# Patient Record
Sex: MALE | Race: WHITE | NOT HISPANIC OR LATINO | URBAN - METROPOLITAN AREA
[De-identification: names, ages, dates, MRNs, and addresses within clinical notes are randomized per-mention and may not be internally consistent; named-entity substitution may affect disease eponyms.]

---

## 2018-06-14 ENCOUNTER — OUTPATIENT (OUTPATIENT)
Dept: OUTPATIENT SERVICES | Facility: HOSPITAL | Age: 83
LOS: 1 days | End: 2018-06-14
Payer: MEDICARE

## 2018-06-14 DIAGNOSIS — R94.31 ABNORMAL ELECTROCARDIOGRAM [ECG] [EKG]: ICD-10-CM

## 2018-06-14 DIAGNOSIS — I49.3 VENTRICULAR PREMATURE DEPOLARIZATION: ICD-10-CM

## 2018-06-14 PROCEDURE — 78452 HT MUSCLE IMAGE SPECT MULT: CPT | Mod: 26

## 2018-06-14 PROCEDURE — 93018 CV STRESS TEST I&R ONLY: CPT

## 2018-06-14 PROCEDURE — A9500: CPT

## 2018-06-14 PROCEDURE — 78452 HT MUSCLE IMAGE SPECT MULT: CPT

## 2018-06-14 PROCEDURE — A9505: CPT

## 2018-06-14 PROCEDURE — 93016 CV STRESS TEST SUPVJ ONLY: CPT

## 2018-06-14 PROCEDURE — 93017 CV STRESS TEST TRACING ONLY: CPT

## 2020-10-16 ENCOUNTER — APPOINTMENT (OUTPATIENT)
Dept: HEART AND VASCULAR | Facility: CLINIC | Age: 85
End: 2020-10-16
Payer: MEDICARE

## 2020-10-16 DIAGNOSIS — M10.9 GOUT, UNSPECIFIED: ICD-10-CM

## 2020-10-16 DIAGNOSIS — E78.5 HYPERLIPIDEMIA, UNSPECIFIED: ICD-10-CM

## 2020-10-16 DIAGNOSIS — E11.9 TYPE 2 DIABETES MELLITUS W/OUT COMPLICATIONS: ICD-10-CM

## 2020-10-16 DIAGNOSIS — Z87.891 PERSONAL HISTORY OF NICOTINE DEPENDENCE: ICD-10-CM

## 2020-10-16 DIAGNOSIS — I48.19 OTHER PERSISTENT ATRIAL FIBRILLATION: ICD-10-CM

## 2020-10-16 DIAGNOSIS — G25.0 ESSENTIAL TREMOR: ICD-10-CM

## 2020-10-16 PROCEDURE — 99204 OFFICE O/P NEW MOD 45 MIN: CPT

## 2020-10-18 PROBLEM — E78.5 HYPERLIPIDEMIA, MILD: Status: ACTIVE | Noted: 2020-10-18

## 2020-10-18 PROBLEM — I48.19 PERSISTENT ATRIAL FIBRILLATION: Status: ACTIVE | Noted: 2020-10-18

## 2020-10-18 PROBLEM — Z87.891 FORMER SMOKER: Status: ACTIVE | Noted: 2020-10-18

## 2020-10-18 PROBLEM — M10.9 GOUT: Status: ACTIVE | Noted: 2020-10-18

## 2020-10-18 PROBLEM — E11.9 TYPE II DIABETES MELLITUS: Status: ACTIVE | Noted: 2020-10-18

## 2020-10-18 PROBLEM — G25.0 TREMOR, ESSENTIAL: Status: ACTIVE | Noted: 2020-10-18

## 2020-10-18 NOTE — PHYSICAL EXAM
[General Appearance - Well Developed] : well developed [Normal Appearance] : normal appearance [Well Groomed] : well groomed [General Appearance - Well Nourished] : well nourished [General Appearance - In No Acute Distress] : no acute distress [Normal Conjunctiva] : the conjunctiva exhibited no abnormalities [No Deformities] : no deformities [Normal Oral Mucosa] : normal oral mucosa [Eyelids - No Xanthelasma] : the eyelids demonstrated no xanthelasmas [No Oral Pallor] : no oral pallor [Normal Jugular Venous A Waves Present] : normal jugular venous A waves present [No Oral Cyanosis] : no oral cyanosis [No Jugular Venous [place] A Waves] : no jugular venous [place] A waves [Normal Jugular Venous V Waves Present] : normal jugular venous V waves present [Irregularly Irregular] : irregularly irregular [Respiration, Rhythm And Depth] : normal respiratory rhythm and effort [Exaggerated Use Of Accessory Muscles For Inspiration] : no accessory muscle use [Auscultation Breath Sounds / Voice Sounds] : lungs were clear to auscultation bilaterally [Abdomen Soft] : soft [Abdomen Tenderness] : non-tender [Abdomen Mass (___ Cm)] : no abdominal mass palpated [Gait - Sufficient For Exercise Testing] : the gait was sufficient for exercise testing [Abnormal Walk] : normal gait [Cyanosis, Localized] : no localized cyanosis [Nail Clubbing] : no clubbing of the fingernails [Petechial Hemorrhages (___cm)] : no petechial hemorrhages [] : no ischemic changes

## 2020-10-18 NOTE — REVIEW OF SYSTEMS
[Fever] : no fever [Feeling Fatigued] : feeling fatigued [Chills] : no chills [Shortness Of Breath] : no shortness of breath [Dyspnea on exertion] : dyspnea during exertion [Chest  Pressure] : no chest pressure [Lower Ext Edema] : lower extremity edema [Leg Claudication] : no intermittent leg claudication [Palpitations] : no palpitations [Negative] : Neurological

## 2020-10-18 NOTE — DISCUSSION/SUMMARY
[FreeTextEntry1] : Mr. Son is an 86 year-old male with HTN, HLD, LGIB, and persistent AF presenting with fatigue and SAENZ.  I have informed him and his wife that his symptoms may be related to his arrhythmia and a trial of rhythm control with DCCV is advisable.  However, AC is required prior to the procedure.  As such, we will await GI clearance.  Following such clearance we will proceed with Eliquis followed by QUOC/DCCV.  If Mr. Son's symptoms improve following restoration NSR, we will consider AAD or ablative therapy.  He will follow-up in 1-2 months or sooner if needed.

## 2020-10-18 NOTE — HISTORY OF PRESENT ILLNESS
[FreeTextEntry1] : Mr. Son is an 86 year-old male with a history of type II DM, LGIB, HTN, HLD, diastolic HF, and persistent AF who presents to [facility] care.  He reports the onset of fatigue and SAENZ limiting to  yards of ambulation beginning 4-6 months prior.  He denies associated chest pain, SOB at rest, orthopnea, PND, palpitations, and syncope.  He does note LE edema that has been successful managed with Lasix.  He was diagnosed with AF and started on a rate control strategy without a significant improvement in his symptoms.  Despite an elevated CHADsVasc, he was not started on AC due to h/o LGIB.  He underwent colonoscopy that revealed polpys.  He is currently awaiting biopsy results prior to being started on AC.

## 2020-12-04 ENCOUNTER — APPOINTMENT (OUTPATIENT)
Dept: OPHTHALMOLOGY | Facility: CLINIC | Age: 85
End: 2020-12-04
Payer: MEDICARE

## 2020-12-04 ENCOUNTER — NON-APPOINTMENT (OUTPATIENT)
Age: 85
End: 2020-12-04

## 2020-12-04 PROCEDURE — 92015 DETERMINE REFRACTIVE STATE: CPT

## 2020-12-04 PROCEDURE — 92004 COMPRE OPH EXAM NEW PT 1/>: CPT

## 2021-01-25 ENCOUNTER — INPATIENT (INPATIENT)
Facility: HOSPITAL | Age: 86
LOS: 2 days | Discharge: HOME HEALTH | DRG: 872 | End: 2021-01-28
Attending: INTERNAL MEDICINE | Admitting: INTERNAL MEDICINE
Payer: MEDICARE

## 2021-01-25 VITALS
HEART RATE: 72 BPM | SYSTOLIC BLOOD PRESSURE: 194 MMHG | RESPIRATION RATE: 18 BRPM | TEMPERATURE: 103 F | OXYGEN SATURATION: 99 % | DIASTOLIC BLOOD PRESSURE: 128 MMHG | WEIGHT: 199.96 LBS | HEIGHT: 69 IN

## 2021-01-25 DIAGNOSIS — M10.9 GOUT, UNSPECIFIED: ICD-10-CM

## 2021-01-25 DIAGNOSIS — I50.30 UNSPECIFIED DIASTOLIC (CONGESTIVE) HEART FAILURE: ICD-10-CM

## 2021-01-25 DIAGNOSIS — E11.9 TYPE 2 DIABETES MELLITUS WITHOUT COMPLICATIONS: ICD-10-CM

## 2021-01-25 DIAGNOSIS — A41.9 SEPSIS, UNSPECIFIED ORGANISM: ICD-10-CM

## 2021-01-25 DIAGNOSIS — I10 ESSENTIAL (PRIMARY) HYPERTENSION: ICD-10-CM

## 2021-01-25 DIAGNOSIS — I48.91 UNSPECIFIED ATRIAL FIBRILLATION: ICD-10-CM

## 2021-01-25 DIAGNOSIS — R09.89 OTHER SPECIFIED SYMPTOMS AND SIGNS INVOLVING THE CIRCULATORY AND RESPIRATORY SYSTEMS: ICD-10-CM

## 2021-01-25 DIAGNOSIS — K21.9 GASTRO-ESOPHAGEAL REFLUX DISEASE WITHOUT ESOPHAGITIS: ICD-10-CM

## 2021-01-25 DIAGNOSIS — Z29.9 ENCOUNTER FOR PROPHYLACTIC MEASURES, UNSPECIFIED: ICD-10-CM

## 2021-01-25 DIAGNOSIS — N39.0 URINARY TRACT INFECTION, SITE NOT SPECIFIED: ICD-10-CM

## 2021-01-25 DIAGNOSIS — E78.5 HYPERLIPIDEMIA, UNSPECIFIED: ICD-10-CM

## 2021-01-25 DIAGNOSIS — N18.9 CHRONIC KIDNEY DISEASE, UNSPECIFIED: ICD-10-CM

## 2021-01-25 LAB
A1C WITH ESTIMATED AVERAGE GLUCOSE RESULT: 7.6 % — HIGH (ref 4–5.6)
ALBUMIN SERPL ELPH-MCNC: 4.2 G/DL — SIGNIFICANT CHANGE UP (ref 3.3–5)
ALP SERPL-CCNC: 70 U/L — SIGNIFICANT CHANGE UP (ref 40–120)
ALT FLD-CCNC: 10 U/L — SIGNIFICANT CHANGE UP (ref 10–45)
ANION GAP SERPL CALC-SCNC: 13 MMOL/L — SIGNIFICANT CHANGE UP (ref 5–17)
APPEARANCE UR: CLEAR — SIGNIFICANT CHANGE UP
APTT BLD: 26.9 SEC — LOW (ref 27.5–35.5)
AST SERPL-CCNC: 22 U/L — SIGNIFICANT CHANGE UP (ref 10–40)
BASOPHILS # BLD AUTO: 0.02 K/UL — SIGNIFICANT CHANGE UP (ref 0–0.2)
BASOPHILS NFR BLD AUTO: 0.1 % — SIGNIFICANT CHANGE UP (ref 0–2)
BILIRUB SERPL-MCNC: 0.8 MG/DL — SIGNIFICANT CHANGE UP (ref 0.2–1.2)
BILIRUB UR-MCNC: NEGATIVE — SIGNIFICANT CHANGE UP
BUN SERPL-MCNC: 30 MG/DL — HIGH (ref 7–23)
CALCIUM SERPL-MCNC: 9 MG/DL — SIGNIFICANT CHANGE UP (ref 8.4–10.5)
CHLORIDE SERPL-SCNC: 98 MMOL/L — SIGNIFICANT CHANGE UP (ref 96–108)
CO2 SERPL-SCNC: 25 MMOL/L — SIGNIFICANT CHANGE UP (ref 22–31)
COLOR SPEC: YELLOW — SIGNIFICANT CHANGE UP
CREAT SERPL-MCNC: 1.5 MG/DL — HIGH (ref 0.5–1.3)
DIFF PNL FLD: ABNORMAL
EOSINOPHIL # BLD AUTO: 0.01 K/UL — SIGNIFICANT CHANGE UP (ref 0–0.5)
EOSINOPHIL NFR BLD AUTO: 0.1 % — SIGNIFICANT CHANGE UP (ref 0–6)
ESTIMATED AVERAGE GLUCOSE: 171 MG/DL — HIGH (ref 68–114)
GLUCOSE BLDC GLUCOMTR-MCNC: 199 MG/DL — HIGH (ref 70–99)
GLUCOSE BLDC GLUCOMTR-MCNC: 234 MG/DL — HIGH (ref 70–99)
GLUCOSE SERPL-MCNC: 226 MG/DL — HIGH (ref 70–99)
GLUCOSE UR QL: NEGATIVE — SIGNIFICANT CHANGE UP
HCT VFR BLD CALC: 31.9 % — LOW (ref 39–50)
HGB BLD-MCNC: 9.7 G/DL — LOW (ref 13–17)
IMM GRANULOCYTES NFR BLD AUTO: 0.4 % — SIGNIFICANT CHANGE UP (ref 0–1.5)
INR BLD: 1.22 — HIGH (ref 0.88–1.16)
KETONES UR-MCNC: ABNORMAL MG/DL
LACTATE SERPL-SCNC: 2.7 MMOL/L — HIGH (ref 0.5–2)
LACTATE SERPL-SCNC: 4.6 MMOL/L — CRITICAL HIGH (ref 0.5–2)
LDH SERPL L TO P-CCNC: 564 U/L — HIGH (ref 50–242)
LEUKOCYTE ESTERASE UR-ACNC: ABNORMAL
LYMPHOCYTES # BLD AUTO: 0.85 K/UL — LOW (ref 1–3.3)
LYMPHOCYTES # BLD AUTO: 5.4 % — LOW (ref 13–44)
MCHC RBC-ENTMCNC: 28.4 PG — SIGNIFICANT CHANGE UP (ref 27–34)
MCHC RBC-ENTMCNC: 30.4 GM/DL — LOW (ref 32–36)
MCV RBC AUTO: 93.3 FL — SIGNIFICANT CHANGE UP (ref 80–100)
MONOCYTES # BLD AUTO: 1.12 K/UL — HIGH (ref 0–0.9)
MONOCYTES NFR BLD AUTO: 7.1 % — SIGNIFICANT CHANGE UP (ref 2–14)
NEUTROPHILS # BLD AUTO: 13.67 K/UL — HIGH (ref 1.8–7.4)
NEUTROPHILS NFR BLD AUTO: 86.9 % — HIGH (ref 43–77)
NITRITE UR-MCNC: NEGATIVE — SIGNIFICANT CHANGE UP
NRBC # BLD: 0 /100 WBCS — SIGNIFICANT CHANGE UP (ref 0–0)
PH UR: 6.5 — SIGNIFICANT CHANGE UP (ref 5–8)
PLATELET # BLD AUTO: 190 K/UL — SIGNIFICANT CHANGE UP (ref 150–400)
POTASSIUM SERPL-MCNC: 5.3 MMOL/L — SIGNIFICANT CHANGE UP (ref 3.5–5.3)
POTASSIUM SERPL-SCNC: 5.3 MMOL/L — SIGNIFICANT CHANGE UP (ref 3.5–5.3)
PROCALCITONIN SERPL-MCNC: 0.06 NG/ML — SIGNIFICANT CHANGE UP (ref 0.02–0.1)
PROT SERPL-MCNC: 7.6 G/DL — SIGNIFICANT CHANGE UP (ref 6–8.3)
PROT UR-MCNC: 30 MG/DL
PROTHROM AB SERPL-ACNC: 14.5 SEC — HIGH (ref 10.6–13.6)
RBC # BLD: 3.42 M/UL — LOW (ref 4.2–5.8)
RBC # FLD: 15.5 % — HIGH (ref 10.3–14.5)
SARS-COV-2 RNA SPEC QL NAA+PROBE: SIGNIFICANT CHANGE UP
SODIUM SERPL-SCNC: 136 MMOL/L — SIGNIFICANT CHANGE UP (ref 135–145)
SP GR SPEC: 1.02 — SIGNIFICANT CHANGE UP (ref 1–1.03)
UROBILINOGEN FLD QL: 0.2 E.U./DL — SIGNIFICANT CHANGE UP
WBC # BLD: 15.73 K/UL — HIGH (ref 3.8–10.5)
WBC # FLD AUTO: 15.73 K/UL — HIGH (ref 3.8–10.5)

## 2021-01-25 PROCEDURE — 93010 ELECTROCARDIOGRAM REPORT: CPT

## 2021-01-25 PROCEDURE — 99223 1ST HOSP IP/OBS HIGH 75: CPT | Mod: GC,AI

## 2021-01-25 PROCEDURE — 71045 X-RAY EXAM CHEST 1 VIEW: CPT | Mod: 26

## 2021-01-25 PROCEDURE — 99285 EMERGENCY DEPT VISIT HI MDM: CPT

## 2021-01-25 RX ORDER — SODIUM CHLORIDE 9 MG/ML
500 INJECTION INTRAMUSCULAR; INTRAVENOUS; SUBCUTANEOUS ONCE
Refills: 0 | Status: COMPLETED | OUTPATIENT
Start: 2021-01-25 | End: 2021-01-25

## 2021-01-25 RX ORDER — LISINOPRIL 2.5 MG/1
10 TABLET ORAL DAILY
Refills: 0 | Status: DISCONTINUED | OUTPATIENT
Start: 2021-01-25 | End: 2021-01-25

## 2021-01-25 RX ORDER — SODIUM CHLORIDE 9 MG/ML
1000 INJECTION, SOLUTION INTRAVENOUS
Refills: 0 | Status: DISCONTINUED | OUTPATIENT
Start: 2021-01-25 | End: 2021-01-28

## 2021-01-25 RX ORDER — DEXTROSE 50 % IN WATER 50 %
12.5 SYRINGE (ML) INTRAVENOUS ONCE
Refills: 0 | Status: DISCONTINUED | OUTPATIENT
Start: 2021-01-25 | End: 2021-01-28

## 2021-01-25 RX ORDER — ALLOPURINOL 300 MG
100 TABLET ORAL EVERY 24 HOURS
Refills: 0 | Status: DISCONTINUED | OUTPATIENT
Start: 2021-01-25 | End: 2021-01-28

## 2021-01-25 RX ORDER — METOPROLOL TARTRATE 50 MG
25 TABLET ORAL ONCE
Refills: 0 | Status: COMPLETED | OUTPATIENT
Start: 2021-01-25 | End: 2021-01-25

## 2021-01-25 RX ORDER — SODIUM CHLORIDE 9 MG/ML
1000 INJECTION INTRAMUSCULAR; INTRAVENOUS; SUBCUTANEOUS ONCE
Refills: 0 | Status: COMPLETED | OUTPATIENT
Start: 2021-01-25 | End: 2021-01-25

## 2021-01-25 RX ORDER — CEFTRIAXONE 500 MG/1
1000 INJECTION, POWDER, FOR SOLUTION INTRAMUSCULAR; INTRAVENOUS ONCE
Refills: 0 | Status: COMPLETED | OUTPATIENT
Start: 2021-01-25 | End: 2021-01-25

## 2021-01-25 RX ORDER — DEXTROSE 50 % IN WATER 50 %
15 SYRINGE (ML) INTRAVENOUS ONCE
Refills: 0 | Status: DISCONTINUED | OUTPATIENT
Start: 2021-01-25 | End: 2021-01-28

## 2021-01-25 RX ORDER — PANTOPRAZOLE SODIUM 20 MG/1
40 TABLET, DELAYED RELEASE ORAL
Refills: 0 | Status: DISCONTINUED | OUTPATIENT
Start: 2021-01-25 | End: 2021-01-28

## 2021-01-25 RX ORDER — FUROSEMIDE 40 MG
20 TABLET ORAL DAILY
Refills: 0 | Status: DISCONTINUED | OUTPATIENT
Start: 2021-01-25 | End: 2021-01-25

## 2021-01-25 RX ORDER — DEXTROSE 50 % IN WATER 50 %
25 SYRINGE (ML) INTRAVENOUS ONCE
Refills: 0 | Status: DISCONTINUED | OUTPATIENT
Start: 2021-01-25 | End: 2021-01-28

## 2021-01-25 RX ORDER — ACETAMINOPHEN 500 MG
650 TABLET ORAL EVERY 6 HOURS
Refills: 0 | Status: DISCONTINUED | OUTPATIENT
Start: 2021-01-25 | End: 2021-01-28

## 2021-01-25 RX ORDER — ACETAMINOPHEN 500 MG
650 TABLET ORAL ONCE
Refills: 0 | Status: COMPLETED | OUTPATIENT
Start: 2021-01-25 | End: 2021-01-25

## 2021-01-25 RX ORDER — CIPROFLOXACIN LACTATE 400MG/40ML
400 VIAL (ML) INTRAVENOUS ONCE
Refills: 0 | Status: DISCONTINUED | OUTPATIENT
Start: 2021-01-25 | End: 2021-01-25

## 2021-01-25 RX ORDER — AMIODARONE HYDROCHLORIDE 400 MG/1
200 TABLET ORAL DAILY
Refills: 0 | Status: DISCONTINUED | OUTPATIENT
Start: 2021-01-25 | End: 2021-01-28

## 2021-01-25 RX ORDER — APIXABAN 2.5 MG/1
2.5 TABLET, FILM COATED ORAL EVERY 12 HOURS
Refills: 0 | Status: DISCONTINUED | OUTPATIENT
Start: 2021-01-25 | End: 2021-01-27

## 2021-01-25 RX ORDER — ATORVASTATIN CALCIUM 80 MG/1
20 TABLET, FILM COATED ORAL AT BEDTIME
Refills: 0 | Status: DISCONTINUED | OUTPATIENT
Start: 2021-01-25 | End: 2021-01-28

## 2021-01-25 RX ORDER — CEFTRIAXONE 500 MG/1
1000 INJECTION, POWDER, FOR SOLUTION INTRAMUSCULAR; INTRAVENOUS EVERY 24 HOURS
Refills: 0 | Status: DISCONTINUED | OUTPATIENT
Start: 2021-01-26 | End: 2021-01-28

## 2021-01-25 RX ORDER — GLUCAGON INJECTION, SOLUTION 0.5 MG/.1ML
1 INJECTION, SOLUTION SUBCUTANEOUS ONCE
Refills: 0 | Status: DISCONTINUED | OUTPATIENT
Start: 2021-01-25 | End: 2021-01-28

## 2021-01-25 RX ORDER — METOPROLOL TARTRATE 50 MG
25 TABLET ORAL DAILY
Refills: 0 | Status: DISCONTINUED | OUTPATIENT
Start: 2021-01-25 | End: 2021-01-28

## 2021-01-25 RX ORDER — INSULIN LISPRO 100/ML
VIAL (ML) SUBCUTANEOUS
Refills: 0 | Status: DISCONTINUED | OUTPATIENT
Start: 2021-01-25 | End: 2021-01-28

## 2021-01-25 RX ADMIN — SODIUM CHLORIDE 1000 MILLILITER(S): 9 INJECTION INTRAMUSCULAR; INTRAVENOUS; SUBCUTANEOUS at 12:28

## 2021-01-25 RX ADMIN — Medication 4: at 21:14

## 2021-01-25 RX ADMIN — Medication 650 MILLIGRAM(S): at 10:26

## 2021-01-25 RX ADMIN — CEFTRIAXONE 100 MILLIGRAM(S): 500 INJECTION, POWDER, FOR SOLUTION INTRAMUSCULAR; INTRAVENOUS at 12:27

## 2021-01-25 RX ADMIN — Medication 650 MILLIGRAM(S): at 23:31

## 2021-01-25 RX ADMIN — SODIUM CHLORIDE 500 MILLILITER(S): 9 INJECTION INTRAMUSCULAR; INTRAVENOUS; SUBCUTANEOUS at 23:31

## 2021-01-25 RX ADMIN — Medication 25 MILLIGRAM(S): at 14:43

## 2021-01-25 RX ADMIN — SODIUM CHLORIDE 500 MILLILITER(S): 9 INJECTION INTRAMUSCULAR; INTRAVENOUS; SUBCUTANEOUS at 10:56

## 2021-01-25 RX ADMIN — Medication 100 MILLIGRAM(S): at 14:43

## 2021-01-25 RX ADMIN — APIXABAN 2.5 MILLIGRAM(S): 2.5 TABLET, FILM COATED ORAL at 17:56

## 2021-01-25 RX ADMIN — AMIODARONE HYDROCHLORIDE 200 MILLIGRAM(S): 400 TABLET ORAL at 14:43

## 2021-01-25 RX ADMIN — ATORVASTATIN CALCIUM 20 MILLIGRAM(S): 80 TABLET, FILM COATED ORAL at 21:14

## 2021-01-25 RX ADMIN — Medication 2: at 17:56

## 2021-01-25 NOTE — H&P ADULT - PROBLEM SELECTOR PLAN 2
- C/w amiodarone - C/w amiodarone  - f/u pharmacy and/or outpatient provider re: Eliquis. It was previously held 2/2 possible GIB.

## 2021-01-25 NOTE — ED ADULT NURSE NOTE - OBJECTIVE STATEMENT
Pt reports being admitted to [facility] for blood transfusion and was discharged yesterday and had pollock catheter removed. Pt reports burning with urination, frequency since pollock was removed. Pt also reports fever this morning at home, did not take BP meds at home today. Pt denies n/v, diarrhea. Pt also reports hx of essential tremors and arriving with tremors at baseline.

## 2021-01-25 NOTE — H&P ADULT - NSHPPHYSICALEXAM_GEN_ALL_CORE
Vital Signs Last 24 Hrs  T(C): 38 (25 Jan 2021 11:22), Max: 39.4 (25 Jan 2021 09:54)  T(F): 100.4 (25 Jan 2021 11:22), Max: 102.9 (25 Jan 2021 09:54)  HR: 103 (25 Jan 2021 11:22) (72 - 103)  BP: 133/61 (25 Jan 2021 11:22) (133/61 - 194/128)  BP(mean): --  RR: 18 (25 Jan 2021 11:22) (18 - 18)  SpO2: 96% (25 Jan 2021 11:22) (96% - 99%)      PHYSICAL EXAM:  General: NAD  HEENT: NC/AT; PERRL, clear conjunctiva  Neck: supple  Respiratory: CTA b/l  Cardiovascular: +S1/S2; RRR  Abdomen: soft, NT/ND; +BS x4  Extremities: 2+ peripheral pulses b/l; no LE edema  Skin: normal color and turgor; no rash  Neurological:   Psychiatry: Vital Signs Last 24 Hrs  T(C): 36.8 (25 Jan 2021 13:47), Max: 39.4 (25 Jan 2021 09:54)  T(F): 98.2 (25 Jan 2021 13:47), Max: 102.9 (25 Jan 2021 09:54)  HR: 101 (25 Jan 2021 13:47) (72 - 103)  BP: 130/68 (25 Jan 2021 13:47) (130/68 - 194/128)  BP(mean): --  RR: 18 (25 Jan 2021 13:47) (18 - 18)  SpO2: 96% (25 Jan 2021 13:47) (96% - 99%)    PHYSICAL EXAM:  General: NAD, resting comfortably in bed  HEENT: NC/AT; PERRL, clear conjunctiva  Neck: supple  Respiratory: CTA b/l; Coughing during interview.   Cardiovascular: +S1/S2; RRR  Abdomen: soft, NT/ND; +BS x4  Extremities: 2+ peripheral pulses b/l; no LE edema  Skin: normal color and turgor; no rash  Neurological:  AAO3  Psychiatry: appropriate mood/affect. pleasant.

## 2021-01-25 NOTE — ED ADULT TRIAGE NOTE - CHIEF COMPLAINT QUOTE
pt YOMI, had pollock cathter placed and removed yesterday at another hospital. now c/o burning urination, some retention, fever and nausea.

## 2021-01-25 NOTE — H&P ADULT - HISTORY OF PRESENT ILLNESS
86M with a h/o afib on eliquis and amiodarone, gout, chf, HTN, high cholesterol, CKD, GERD, borderline DM, who p/w dysuria and fever. Pt states he was at [facility] yesterday for a blood transfusion and had a pollock placed and then removed. He developed worsening dysuria and fever today so came to . Also c/o nausea.     wife reports hgb dropping with +guiac egg/colo showed plyps with some bleeding, followed by capsule endo on Wednesday, still no result.   [facility] on thursday - sun in obs unit, hgb 8.2, K 5.5 corrected in the ER, now off eliquis bc of GIB, also DC'd quinapril bc it may have contributed to renal insufficiency. 86M with a h/o afib on eliquis and amiodarone, gout, diastolic HF, HTN, HLD, CKD, GERD, DM, who p/w dysuria and fever. Pt states he was at [facility] yesterday for a blood transfusion and had a pollock placed and then removed. He developed worsening dysuria and fever today so came to . Also c/o nausea.     wife reports hgb dropping with +guiac egg/colo showed plyps with some bleeding, followed by capsule endo on Wednesday, still no result.   [facility] on thursday - sun in obs unit, hgb 8.2, K 5.5 corrected in the ER, now off eliquis bc of GIB, also DC'd quinapril bc it may have contributed to renal insufficiency. 86M with a h/o Afib (on Eliquis and Amiodarone), Gout (on Allopurinol chronically), diastolic HF, HTN, HLD, CKD III, GERD, and TIIDM, who p/w dysuria and fever one day after being discharged from [facility]. He was briefly at [facility] due to anemia and a blood transfusion, and had a temporary pollock placed and removed (unclear why). Since going home yesterday, he has been experiencing dysuria. This AM he woke up and states the dysuria was much worse, along with lower abdo pain, fever/chills, and nausea. Additionally, his wife reports a recent positive stool guiac, colonoscopy showing polyps with some bleeding, and a capsule endoscopy recently, the results of which are still pending. His Eliquis has recently been held in setting of GIB, along with quinapril due to possible MELINDA on CKD.     In the ED, he was febrile to 102.9, with leukocytosis 15.7, and had a positive UA.  He was treated with 500 cc bolus NS, Ceftriaxone 1000mg, and Tylenol.

## 2021-01-25 NOTE — ED PROVIDER NOTE - PHYSICAL EXAMINATION
GEN: Well appearing, well nourished, awake, alert, oriented to person, place, time/situation and in no apparent distress. Febrile but nontoxic. Essential tremor.  ENT: Airway patent, Nasal mucosa clear. Mouth with normal mucosa.  EYES: Clear bilaterally. PERRL, EOMI  RESPIRATORY: Breathing comfortably with normal RR. No W/C/R, no hypoxia or resp distress.  CARDIAC: Regular rate and rhythm, no M/R/G  ABDOMEN: Soft, nontender, +bowel sounds, no rebound, rigidity, or guarding. No suprapubic mass or TTP.  MSK: Range of motion is not limited, no deformities noted.  NEURO: Alert and oriented, no focal deficits.  SKIN: Skin normal color for race, warm, dry and intact. No evidence of rash.  PSYCH: Alert and oriented to person, place, time/situation. normal mood and affect. no apparent risk to self or others.

## 2021-01-25 NOTE — H&P ADULT - NSHPREVIEWOFSYSTEMS_GEN_ALL_CORE
REVIEW OF SYSTEMS:    CONSTITUTIONAL: No weakness, fevers or chills  EYES/ENT: No visual changes;  No vertigo or throat pain   NECK: No pain or stiffness  RESPIRATORY: No cough, wheezing, hemoptysis; No shortness of breath  CARDIOVASCULAR: No chest pain or palpitations  GASTROINTESTINAL: No abdominal or epigastric pain. No nausea, vomiting, or hematemesis; No diarrhea or constipation. No melena or hematochezia.  GENITOURINARY: No dysuria, frequency or hematuria  NEUROLOGICAL: No numbness or weakness  SKIN: No itching, rashes REVIEW OF SYSTEMS:    CONSTITUTIONAL: fever/chills have resovled. Now feels well/improved.   EYES/ENT: No visual changes;  No vertigo or throat pain   NECK: No pain or stiffness  RESPIRATORY: New cough today. otherwise no SOB. Has longstanding dyspnea with exertion; cannot walk more than 20 feet without dyspnea/fatigue.   CARDIOVASCULAR: No chest pain or palpitations  GASTROINTESTINAL: No abdominal or epigastric pain. No nausea, vomiting, or hematemesis; No diarrhea or constipation. No melena or hematochezia.  GENITOURINARY: Dysuria.  NEUROLOGICAL: No numbness or weakness  SKIN: No itching, rashes

## 2021-01-25 NOTE — H&P ADULT - PROBLEM SELECTOR PLAN 3
F: No IVF at this time  E: Replete electrolytes as needed, K>4, M>2  N:  DVT ppx:  GI ppx:   Dispo: - holding quinapril/lisinopril i/s/o possible MELINDA on CKD  - renally dose meds  - avoid nephrotoxic agents  - holding lasix i/s/o sepsis and CKD

## 2021-01-25 NOTE — ED ADULT NURSE NOTE - NSIMPLEMENTINTERV_GEN_ALL_ED
Implemented All Fall with Harm Risk Interventions:  [place] to call system. Call bell, personal items and telephone within reach. Instruct patient to call for assistance. Room bathroom lighting operational. Non-slip footwear when patient is off stretcher. Physically safe environment: no spills, clutter or unnecessary equipment. Stretcher in lowest position, wheels locked, appropriate side rails in place. Provide visual cue, wrist band, yellow gown, etc. Monitor gait and stability. Monitor for mental status changes and reorient to person, place, and time. Review medications for side effects contributing to fall risk. Reinforce activity limits and safety measures with patient and family. Provide visual clues: red socks.

## 2021-01-25 NOTE — ED PROVIDER NOTE - CARE PLAN
Principal Discharge DX:	UTI (urinary tract infection)  Secondary Diagnosis:	Afib  Secondary Diagnosis:	CKD (chronic kidney disease)  Secondary Diagnosis:	Anemia

## 2021-01-25 NOTE — H&P ADULT - ASSESSMENT
86M with a h/o Afib (on Eliquis and Amiodarone), Gout (on Allopurinol chronically), diastolic HF, HTN, HLD, CKD III, GERD, and TIIDM, who p/w dysuria and fever one day after being discharged from [facility]. He was briefly at [facility] due to anemia and a blood transfusion, and had a temporary pollock placed and removed (unclear why). Since going home yesterday, he has been experiencing dysuria. This AM he woke up and states the dysuria was much worse, along with lower abdo pain, fever/chills, and nausea. His Eliquis has recently been held in setting of GIB, along with quinapril due to possible MELINDA on CKD. He was found with leukocytosis, fever, and positive u/a in the ED, and was started on Ceftriaxone 1g dosing for UTI.

## 2021-01-25 NOTE — H&P ADULT - PROBLEM SELECTOR PLAN 10
F: No IVF at this time, s/p 500cc bolus NS  E: Replete electrolytes as needed, K>4, M>2  N: CC  DVT ppx: SCDs, SQ Heparin  GI ppx: Protonix  Dispo: [facility]

## 2021-01-25 NOTE — ED PROVIDER NOTE - OBJECTIVE STATEMENT
86M with a h/o afib on eliquis and amiodarone, gout, chf, HTN, high cholesterol, CKD, GERD, borderline DM, who p/w dysuria and fever. Pt states he was at [facility] yesterday for a blood transfusion and had a pollock placed and then removed. He developed worsening dysuria and fever today so came to . Also c/o nausea.     wife reports hgb dropping with +guiac egg/colo showed plyps with some bleeding, followed by capsule endo on Wednesday, still no result.   [facility] on thursday - sun in obs unit, hgb 8.2, K 5.5 corrected in the ER, now off eliquis bc of GIB, also DC'd quinapril bc it may have contributed to renal insufficiency.  Repeat cardioversion scheduled 86M with a h/o essential tremor, afib on eliquis and amiodarone, (failed cardioversion on the past), gout, chf on lasix 20mg daily (unknown EF), HTN, high cholesterol, CKD, GERD, borderline DM, who p/w dysuria and fever. Per wife, Marlin, pt was [facility] - yesterday in obs unit for MELINDA, hyperK (5.5), and anemia to 8.5 requiring a blood transfusion, had a pollock placed and then removed. He developed worsening dysuria and fever today associated with nausea so came to ER.   Per wife, pt has a hx of dropping hgb with +guiac egd/colo showed polyps but no obvious source, pt recently had a capsule endo on Wednesday, awaiting results. Denies any black or grossly blood stools recently. per wife, pt was recently taken off eliquis bc of GIB, also DC'd quinapril bc it may have contributed to renal insufficiency. Repeat cardioversion scheduled for afib at [facility].

## 2021-01-25 NOTE — H&P ADULT - NSHPSOCIALHISTORY_GEN_ALL_CORE
Tobacco:  EtOH:  Drug Use:  Living situation:  Employment:  Marital status: , wife is a nurse  Other: Tobacco: former smoker, quit  Etoh: no  drugs: no  Marital status:   Other: wife is nurse, son is aPm HERRING

## 2021-01-25 NOTE — ED PROVIDER NOTE - CLINICAL SUMMARY MEDICAL DECISION MAKING FREE TEXT BOX
86M with a h/o essential tremor, afib on eliquis and amiodarone, (failed cardioversion on the past), gout, chf on lasix 20mg daily (unknown EF), HTN, high cholesterol, CKD, GERD, borderline DM, who p/w dysuria and fever. Per wife, Marlin, pt was [facility] - yesterday in obs unit for MELINDA, hyperK (5.5), and anemia to 8.5 requiring a blood transfusion, had a pollock placed and then removed. He developed worsening dysuria and fever today associated with nausea so came to ER.   On arrival pt is febrile, tachy (EKG afib) initially HTN but BP improved to 130systolic, worked up per sepsis protocol and covered with IVFs and IV ceftriaxone for infected UA. Bedside POC Bladder US negative for urinary retention. Pt will require admission for IV abx and further mgmt of UTI. Pt also with MELINDA and anemia, improved transfusion at [facility], pending results of capsule endoscopy from last Wednesday. 86M with a h/o essential tremor, afib on eliquis and amiodarone, (failed cardioversion on the past), gout, chf on lasix 20mg daily (unknown EF), HTN, high cholesterol, CKD, GERD, borderline DM, who p/w dysuria and fever. Per wife, Marlin, pt was [facility] Thurs - yesterday in obs unit for MELINDA, hyperK (5.5), and anemia to 8.5 requiring a blood transfusion, had a pollock placed and then removed. He developed worsening dysuria and fever today associated with nausea so came to ER.   On arrival pt is febrile, tachy (EKG afib) initially HTN but BP improved to 130systolic, worked up per sepsis protocol and covered with IVFs and IV ceftriaxone for infected UA. Bedside POC Bladder US negative for urinary retention. Pt will require admission for IV abx and further mgmt of UTI. Pt also with MELINDA and anemia, improved s/p transfusion at [facility], pending results of capsule endoscopy from last Wednesday. Pt stable for admission to [facility].

## 2021-01-25 NOTE — H&P ADULT - PROBLEM SELECTOR PLAN 1
Recent admission at [facility], discharged day before presentation. Had temporary pollock catheter during time at [facility] (unclear why), now presenting with complaints of dysuria. U/a showing trace ketons, small leuk esterase, wbc and rbc, moderate blood and bateria.   - bladder scan not showing retention  - s/p Ceftriaxone 1000 mg in ED Presented with sepsis: febrile, leukocytosis, tachycardic.  Recent admission at [facility], discharged day before presentation. Had temporary pollock catheter during time at [facility] (unclear why), now presenting with complaints of dysuria. U/a showing trace ketones, small leuk esterase, wbc and rbc, moderate blood and bateria.   - bladder scan not showing retention  - s/p Ceftriaxone 1000 mg in ED  - c/w Ceftriaxone 1000 mg to 1/31 for total 7 days (need to reorder last day)

## 2021-01-25 NOTE — H&P ADULT - NSHPLABSRESULTS_GEN_ALL_CORE
LABS:                        9.7    15.73 )-----------( 190      ( 2021 10:28 )             31.9         136  |  98  |  30<H>  ----------------------------<  226<H>  5.3   |  25  |  1.50<H>    Ca    9.0      2021 10:28    TPro  7.6  /  Alb  4.2  /  TBili  0.8  /  DBili  x   /  AST  22  /  ALT  10  /  AlkPhos  70      PT/INR - ( 2021 10:28 )   PT: 14.5 sec;   INR: 1.22          PTT - ( 2021 10:28 )  PTT:26.9 sec  Urinalysis Basic - ( 2021 11:28 )    Color: Yellow / Appearance: Clear / S.020 / pH: x  Gluc: x / Ketone: Trace mg/dL  / Bili: Negative / Urobili: 0.2 E.U./dL   Blood: x / Protein: 30 mg/dL / Nitrite: NEGATIVE   Leuk Esterase: Small / RBC: 5-10 /HPF / WBC 5-10 /HPF   Sq Epi: x / Non Sq Epi: 0-5 /HPF / Bacteria: Present /HPF      LIVER FUNCTIONS - ( 2021 10:28 )  Alb: 4.2 g/dL / Pro: 7.6 g/dL / ALK PHOS: 70 U/L / ALT: 10 U/L / AST: 22 U/L / GGT: x LABS:                        9.7    15.73 )-----------( 190      ( 2021 10:28 )             31.9         136  |  98  |  30<H>  ----------------------------<  226<H>  5.3   |  25  |  1.50<H>    Ca    9.0      2021 10:28    TPro  7.6  /  Alb  4.2  /  TBili  0.8  /  DBili  x   /  AST  22  /  ALT  10  /  AlkPhos  70      PT/INR - ( 2021 10:28 )   PT: 14.5 sec;   INR: 1.22          PTT - ( 2021 10:28 )  PTT:26.9 sec  Urinalysis Basic - ( 2021 11:28 )      Color: Yellow / Appearance: Clear / S.020 / pH: x  Gluc: x / Ketone: Trace mg/dL  / Bili: Negative / Urobili: 0.2 E.U./dL   Blood: x / Protein: 30 mg/dL / Nitrite: NEGATIVE   Leuk Esterase: Small / RBC: 5-10 /HPF / WBC 5-10 /HPF   Sq Epi: x / Non Sq Epi: 0-5 /HPF / Bacteria: Present /HPF      LIVER FUNCTIONS - ( 2021 10:28 )  Alb: 4.2 g/dL / Pro: 7.6 g/dL / ALK PHOS: 70 U/L / ALT: 10 U/L / AST: 22 U/L / GGT: x    CXR :  Findings/  impression: Heart size within normal limits, thoracic aortic calcification. Bilateral opacities. Thoracic spine and bilateral shoulder degenerative changes. Elevation of the right hemidiaphragm.    Bladder scan  : No retention

## 2021-01-26 LAB
A1C WITH ESTIMATED AVERAGE GLUCOSE RESULT: 8 % — HIGH (ref 4–5.6)
ALBUMIN SERPL ELPH-MCNC: 3.5 G/DL — SIGNIFICANT CHANGE UP (ref 3.3–5)
ALP SERPL-CCNC: 57 U/L — SIGNIFICANT CHANGE UP (ref 40–120)
ALT FLD-CCNC: 7 U/L — LOW (ref 10–45)
ANION GAP SERPL CALC-SCNC: 12 MMOL/L — SIGNIFICANT CHANGE UP (ref 5–17)
ANISOCYTOSIS BLD QL: SLIGHT — SIGNIFICANT CHANGE UP
AST SERPL-CCNC: 13 U/L — SIGNIFICANT CHANGE UP (ref 10–40)
BASOPHILS # BLD AUTO: 0.07 K/UL — SIGNIFICANT CHANGE UP (ref 0–0.2)
BASOPHILS NFR BLD AUTO: 0.3 % — SIGNIFICANT CHANGE UP (ref 0–2)
BILIRUB DIRECT SERPL-MCNC: 0.2 MG/DL — SIGNIFICANT CHANGE UP (ref 0–0.2)
BILIRUB INDIRECT FLD-MCNC: 0.7 MG/DL — SIGNIFICANT CHANGE UP (ref 0.2–1)
BILIRUB SERPL-MCNC: 0.9 MG/DL — SIGNIFICANT CHANGE UP (ref 0.2–1.2)
BUN SERPL-MCNC: 30 MG/DL — HIGH (ref 7–23)
BURR CELLS BLD QL SMEAR: PRESENT — SIGNIFICANT CHANGE UP
CALCIUM SERPL-MCNC: 8.1 MG/DL — LOW (ref 8.4–10.5)
CHLORIDE SERPL-SCNC: 103 MMOL/L — SIGNIFICANT CHANGE UP (ref 96–108)
CO2 SERPL-SCNC: 22 MMOL/L — SIGNIFICANT CHANGE UP (ref 22–31)
CREAT ?TM UR-MCNC: 126 MG/DL — SIGNIFICANT CHANGE UP
CREAT SERPL-MCNC: 1.67 MG/DL — HIGH (ref 0.5–1.3)
CULTURE RESULTS: SIGNIFICANT CHANGE UP
DACRYOCYTES BLD QL SMEAR: SLIGHT — SIGNIFICANT CHANGE UP
EOSINOPHIL # BLD AUTO: 0.01 K/UL — SIGNIFICANT CHANGE UP (ref 0–0.5)
EOSINOPHIL NFR BLD AUTO: 0 % — SIGNIFICANT CHANGE UP (ref 0–6)
ESTIMATED AVERAGE GLUCOSE: 183 MG/DL — HIGH (ref 68–114)
GLUCOSE BLDC GLUCOMTR-MCNC: 160 MG/DL — HIGH (ref 70–99)
GLUCOSE BLDC GLUCOMTR-MCNC: 168 MG/DL — HIGH (ref 70–99)
GLUCOSE BLDC GLUCOMTR-MCNC: 191 MG/DL — HIGH (ref 70–99)
GLUCOSE BLDC GLUCOMTR-MCNC: 241 MG/DL — HIGH (ref 70–99)
GLUCOSE SERPL-MCNC: 203 MG/DL — HIGH (ref 70–99)
HCT VFR BLD CALC: 27.5 % — LOW (ref 39–50)
HGB BLD-MCNC: 8.2 G/DL — LOW (ref 13–17)
HYPOCHROMIA BLD QL: SIGNIFICANT CHANGE UP
IMM GRANULOCYTES NFR BLD AUTO: 1.3 % — SIGNIFICANT CHANGE UP (ref 0–1.5)
LACTATE SERPL-SCNC: 1.5 MMOL/L — SIGNIFICANT CHANGE UP (ref 0.5–2)
LACTATE SERPL-SCNC: 2 MMOL/L — SIGNIFICANT CHANGE UP (ref 0.5–2)
LYMPHOCYTES # BLD AUTO: 0.86 K/UL — LOW (ref 1–3.3)
LYMPHOCYTES # BLD AUTO: 4 % — LOW (ref 13–44)
MACROCYTES BLD QL: SLIGHT — SIGNIFICANT CHANGE UP
MAGNESIUM SERPL-MCNC: 1.4 MG/DL — LOW (ref 1.6–2.6)
MANUAL SMEAR VERIFICATION: SIGNIFICANT CHANGE UP
MCHC RBC-ENTMCNC: 27.6 PG — SIGNIFICANT CHANGE UP (ref 27–34)
MCHC RBC-ENTMCNC: 29.8 GM/DL — LOW (ref 32–36)
MCV RBC AUTO: 92.6 FL — SIGNIFICANT CHANGE UP (ref 80–100)
MICROCYTES BLD QL: SLIGHT — SIGNIFICANT CHANGE UP
MONOCYTES # BLD AUTO: 1.86 K/UL — HIGH (ref 0–0.9)
MONOCYTES NFR BLD AUTO: 8.6 % — SIGNIFICANT CHANGE UP (ref 2–14)
NEUTROPHILS # BLD AUTO: 18.55 K/UL — HIGH (ref 1.8–7.4)
NEUTROPHILS NFR BLD AUTO: 85.8 % — HIGH (ref 43–77)
NRBC # BLD: 0 /100 WBCS — SIGNIFICANT CHANGE UP (ref 0–0)
OVALOCYTES BLD QL SMEAR: SLIGHT — SIGNIFICANT CHANGE UP
PHOSPHATE SERPL-MCNC: 3.5 MG/DL — SIGNIFICANT CHANGE UP (ref 2.5–4.5)
PLAT MORPH BLD: ABNORMAL
PLATELET # BLD AUTO: 146 K/UL — LOW (ref 150–400)
POIKILOCYTOSIS BLD QL AUTO: SLIGHT — SIGNIFICANT CHANGE UP
POLYCHROMASIA BLD QL SMEAR: SLIGHT — SIGNIFICANT CHANGE UP
POTASSIUM SERPL-MCNC: 4.5 MMOL/L — SIGNIFICANT CHANGE UP (ref 3.5–5.3)
POTASSIUM SERPL-SCNC: 4.5 MMOL/L — SIGNIFICANT CHANGE UP (ref 3.5–5.3)
PROT SERPL-MCNC: 6.5 G/DL — SIGNIFICANT CHANGE UP (ref 6–8.3)
RBC # BLD: 2.97 M/UL — LOW (ref 4.2–5.8)
RBC # FLD: 15.5 % — HIGH (ref 10.3–14.5)
RBC BLD AUTO: ABNORMAL
SODIUM SERPL-SCNC: 137 MMOL/L — SIGNIFICANT CHANGE UP (ref 135–145)
SODIUM UR-SCNC: 32 MMOL/L — SIGNIFICANT CHANGE UP
SPECIMEN SOURCE: SIGNIFICANT CHANGE UP
SPHEROCYTES BLD QL SMEAR: SLIGHT — SIGNIFICANT CHANGE UP
STOMATOCYTES BLD QL SMEAR: SLIGHT — SIGNIFICANT CHANGE UP
WBC # BLD: 21.64 K/UL — HIGH (ref 3.8–10.5)
WBC # FLD AUTO: 21.64 K/UL — HIGH (ref 3.8–10.5)

## 2021-01-26 PROCEDURE — 99233 SBSQ HOSP IP/OBS HIGH 50: CPT | Mod: GC

## 2021-01-26 RX ORDER — SODIUM CHLORIDE 9 MG/ML
500 INJECTION INTRAMUSCULAR; INTRAVENOUS; SUBCUTANEOUS ONCE
Refills: 0 | Status: COMPLETED | OUTPATIENT
Start: 2021-01-26 | End: 2021-01-26

## 2021-01-26 RX ORDER — SODIUM CHLORIDE 9 MG/ML
1000 INJECTION INTRAMUSCULAR; INTRAVENOUS; SUBCUTANEOUS ONCE
Refills: 0 | Status: COMPLETED | OUTPATIENT
Start: 2021-01-26 | End: 2021-01-26

## 2021-01-26 RX ORDER — SODIUM CHLORIDE 9 MG/ML
1000 INJECTION INTRAMUSCULAR; INTRAVENOUS; SUBCUTANEOUS
Refills: 0 | Status: DISCONTINUED | OUTPATIENT
Start: 2021-01-26 | End: 2021-01-28

## 2021-01-26 RX ORDER — MAGNESIUM SULFATE 500 MG/ML
2 VIAL (ML) INJECTION
Refills: 0 | Status: COMPLETED | OUTPATIENT
Start: 2021-01-26 | End: 2021-01-26

## 2021-01-26 RX ADMIN — SODIUM CHLORIDE 1000 MILLILITER(S): 9 INJECTION INTRAMUSCULAR; INTRAVENOUS; SUBCUTANEOUS at 11:59

## 2021-01-26 RX ADMIN — Medication 100 MILLIGRAM(S): at 13:56

## 2021-01-26 RX ADMIN — APIXABAN 2.5 MILLIGRAM(S): 2.5 TABLET, FILM COATED ORAL at 06:47

## 2021-01-26 RX ADMIN — Medication 2: at 13:04

## 2021-01-26 RX ADMIN — AMIODARONE HYDROCHLORIDE 200 MILLIGRAM(S): 400 TABLET ORAL at 06:47

## 2021-01-26 RX ADMIN — Medication 100 GRAM(S): at 11:59

## 2021-01-26 RX ADMIN — ATORVASTATIN CALCIUM 20 MILLIGRAM(S): 80 TABLET, FILM COATED ORAL at 22:04

## 2021-01-26 RX ADMIN — Medication 2: at 08:42

## 2021-01-26 RX ADMIN — CEFTRIAXONE 100 MILLIGRAM(S): 500 INJECTION, POWDER, FOR SOLUTION INTRAMUSCULAR; INTRAVENOUS at 11:59

## 2021-01-26 RX ADMIN — PANTOPRAZOLE SODIUM 40 MILLIGRAM(S): 20 TABLET, DELAYED RELEASE ORAL at 06:47

## 2021-01-26 RX ADMIN — Medication 4: at 22:04

## 2021-01-26 RX ADMIN — APIXABAN 2.5 MILLIGRAM(S): 2.5 TABLET, FILM COATED ORAL at 19:22

## 2021-01-26 RX ADMIN — SODIUM CHLORIDE 500 MILLILITER(S): 9 INJECTION INTRAMUSCULAR; INTRAVENOUS; SUBCUTANEOUS at 01:10

## 2021-01-26 RX ADMIN — Medication 100 GRAM(S): at 13:56

## 2021-01-26 NOTE — PROGRESS NOTE ADULT - PROBLEM SELECTOR PLAN 10
F: No IVF at this time, s/p 500cc bolus NS  E: Replete electrolytes as needed, K>4, M>2  N: CC  DVT ppx: SCDs, SQ Heparin  GI ppx: Protonix  Dispo: [facility] F: 100 cc/hr NS  E: Replete electrolytes as needed, K>4, M>2  N: CC  DVT ppx: SCDs, Eliquis  GI ppx: Protonix  Dispo: [facility]

## 2021-01-26 NOTE — PROGRESS NOTE ADULT - ASSESSMENT
86M with a h/o Afib (on Eliquis and Amiodarone), Gout (on Allopurinol chronically), diastolic HF, HTN, HLD, CKD III, GERD, and TIIDM, who p/w dysuria and fever one day after being discharged from [facility]. He was briefly at [facility] due to anemia and a blood transfusion, and had a temporary pollock placed and removed (unclear why). Since going home yesterday, he has been experiencing dysuria. This AM he woke up and states the dysuria was much worse, along with lower abdo pain, fever/chills, and nausea. His Eliquis has recently been held in setting of GIB, along with quinapril due to possible MELINDA on CKD. He was found with leukocytosis, fever, and positive u/a in the ED, and was started on Ceftriaxone 1g dosing for UTI.  86M with a h/o Afib (on Eliquis and Amiodarone), Gout (on Allopurinol chronically), diastolic HF, HTN, HLD, CKD III, GERD, and TIIDM (diet controlled) who presented to the ED due to dysuria and fever one day after being discharged from [facility]. In the ED he was found to be febrile, tachycardic, with leukocytosis and positive UA. He was treated with 500 cc NS, 1g Ceftriaxone and Tylenol and admitted to the [facility]. Patient is improving on current antibiotic regimen. His Eliquis has recently been held in setting of GIB, along with quinapril due to possible MELINDA on CKD.  86M with a h/o Afib (on Eliquis and Amiodarone), Gout (on Allopurinol chronically), diastolic HF, HTN, HLD, CKD III, GERD, and TIIDM (diet controlled) who presented to the ED due to dysuria and fever one day after being discharged from [facility]. In the ED he was found to be febrile, tachycardic, with leukocytosis and positive UA. He was treated with 500 cc NS, 1g Ceftriaxone and Tylenol and admitted to the [facility]. Patient is improving symptomatically on current antibiotic regimen. His Eliquis has recently been held in setting of GIB, along with quinapril due to possible MELINDA on CKD.  86M with a h/o Afib (on Eliquis and Amiodarone), Gout (on Allopurinol chronically), diastolic HF, HTN, HLD, CKD III, GERD, and TIIDM (diet controlled) who presented to the ED due to dysuria and fever one day after being discharged from [facility]. In the ED he was found to be febrile, tachycardic, with leukocytosis and positive UA. He was treated with 500 cc NS, 1g Ceftriaxone and Tylenol and admitted to the [facility]. Patient is in stable condition, improving symptomatically on current antibiotic regimen but has uptrending leukocytosis (21.64 <- 15.73) and creatinine (1.67<-1.5). His Eliquis has recently been held in setting of GIB, along with quinapril due to possible MELINDA on CKD.

## 2021-01-26 NOTE — PROGRESS NOTE ADULT - PROBLEM SELECTOR PLAN 6
- f/u A1C  - Insulin Sliding Scale  - CC diet Not well controlled, a1c this admission 8  - Insulin Sliding Scale  - CC diet

## 2021-01-26 NOTE — PROGRESS NOTE ADULT - PROBLEM SELECTOR PLAN 1
Presented with sepsis: febrile, leukocytosis, tachycardic.  Recent admission at [facility], discharged day before presentation. Had temporary pollock catheter during time at [facility] (unclear why), now presenting with complaints of dysuria. U/a showing trace ketones, small leuk esterase, wbc and rbc, moderate blood and bateria.   - bladder scan not showing retention  - s/p Ceftriaxone 1000 mg in ED  - c/w Ceftriaxone 1000 mg to 1/31 for total 7 days (need to reorder last day)

## 2021-01-26 NOTE — PROGRESS NOTE ADULT - PROBLEM SELECTOR PLAN 2
- C/w amiodarone  - f/u pharmacy and/or outpatient provider re: Eliquis. It was previously held 2/2 possible GIB. - C/w amiodarone  - f/u pharmacy and/or outpatient provider re: Eliquis. It was previously held 2/2 possible GIB.  - Yaakov being held since 1/21 due to possible GIB   - Scheduled for cardioversion on 2/4 @ [facility] - C/w amiodarone  - f/u pharmacy and/or outpatient provider re: Eliquis. It was previously held 2/2 possible GIB.  - Eliquis restarted  - Scheduled for cardioversion on 2/4 @ [facility]

## 2021-01-26 NOTE — PROGRESS NOTE ADULT - PROBLEM SELECTOR PLAN 3
- holding quinapril/lisinopril i/s/o possible MELINDA on CKD  - renally dose meds  - avoid nephrotoxic agents  - holding lasix i/s/o sepsis and CKD

## 2021-01-26 NOTE — PHYSICAL THERAPY INITIAL EVALUATION ADULT - ADDITIONAL COMMENTS
Pt reports living with his wife, denied LINA, and does not use any AD in PLOF. Pt reports he is able to grab onto walls to help walk around his apartment.

## 2021-01-26 NOTE — PROGRESS NOTE ADULT - SUBJECTIVE AND OBJECTIVE BOX
Vital Signs Last 24 Hrs  T(C): 37.8 (26 Jan 2021 06:06), Max: 39.4 (25 Jan 2021 09:54)  T(F): 100 (26 Jan 2021 06:06), Max: 102.9 (25 Jan 2021 09:54)  HR: 72 (26 Jan 2021 06:06) (69 - 104)  BP: 106/63 (26 Jan 2021 06:06) (104/58 - 194/128)  BP(mean): --  RR: 18 (26 Jan 2021 06:06) (16 - 20)  SpO2: 96% (26 Jan 2021 06:06) (94% - 99%) INTERVAL HPI/OVERNIGHT EVENTS:      On further ROS, patient did not have complaint of: Headache, Blurred Vision, Cough, Dyspnea, Palpitations, Abdominal Pain, N/V, Weakness, Change in Sensation.     VITAL SIGNS:  T(F): 100 (21 @ 06:06)  HR: 72 (21 @ 06:06)  BP: 106/63 (21 @ 06:06)  RR: 18 (21 @ 06:06)  SpO2: 96% (21 @ 06:06)  Wt(kg): --    Input & Output:    21 @ 07:  -  21 @ 07:00  --------------------------------------------------------  IN: 1000 mL / OUT: 100 mL / NET: 900 mL        PHYSICAL EXAM:  General: Comfortable, pleasant/anxious/agitated, Ill-appearing, well-nourished/frail/cachectic, comfortable / in distress  Neurological: AAOx3, no focal deficits  HEENT: NC/AT; EOMI, PERRL, clear conjunctiva, no nasal or oropharyngeal discharge or exudates, MMM  Neck: supple, no cervical or post-auricular lymphadenopathy  Cardiovascular: +S1/S2, no murmurs/rubs/gallops, RRR  Respiratory: CTA B/L, no diminished breath sounds, no wheezes/rales/rhonchi, no increased work of breathing or accessory muscle use  Gastrointestinal: soft, NT/ND; active BSx4 quadrants  Genitourinary: no suprapubic tenderness, no CVA tenderness  Extremities: WWP; no edema, clubbing or cyanosis  Vascular: 2+ radial, DP/PT pulses B/L  Skin: no rashes  Lines/Drains:     MEDICATIONS  (STANDING):  allopurinol 100 milliGRAM(s) Oral every 24 hours  aMIOdarone    Tablet 200 milliGRAM(s) Oral daily  apixaban 2.5 milliGRAM(s) Oral every 12 hours  atorvastatin 20 milliGRAM(s) Oral at bedtime  cefTRIAXone   IVPB 1000 milliGRAM(s) IV Intermittent every 24 hours  dextrose 40% Gel 15 Gram(s) Oral once  dextrose 5%. 1000 milliLiter(s) (50 mL/Hr) IV Continuous <Continuous>  dextrose 5%. 1000 milliLiter(s) (100 mL/Hr) IV Continuous <Continuous>  dextrose 50% Injectable 25 Gram(s) IV Push once  dextrose 50% Injectable 12.5 Gram(s) IV Push once  dextrose 50% Injectable 25 Gram(s) IV Push once  glucagon  Injectable 1 milliGRAM(s) IntraMuscular once  insulin lispro (ADMELOG) corrective regimen sliding scale   SubCutaneous Before meals and at bedtime  metoprolol succinate ER 25 milliGRAM(s) Oral daily  pantoprazole    Tablet 40 milliGRAM(s) Oral before breakfast    MEDICATIONS  (PRN):  acetaminophen   Tablet .. 650 milliGRAM(s) Oral every 6 hours PRN Temp greater or equal to 38C (100.4F), Moderate Pain (4 - 6)      Allergies    penicillin (Other (Mild))  sulfa drugs (Unknown)    Intolerances        LABS:                        9.7    15.73 )-----------( 190      ( 2021 10:28 )             31.9         136  |  98  |  30<H>  ----------------------------<  226<H>  5.3   |  25  |  1.50<H>    Ca    9.0      2021 10:28    TPro  7.6  /  Alb  4.2  /  TBili  0.8  /  DBili  x   /  AST  22  /  ALT  10  /  AlkPhos  70      PT/INR - ( 2021 10:28 )   PT: 14.5 sec;   INR: 1.22          PTT - ( 2021 10:28 )  PTT:26.9 sec  Urinalysis Basic - ( 2021 11:28 )    Color: Yellow / Appearance: Clear / S.020 / pH: x  Gluc: x / Ketone: Trace mg/dL  / Bili: Negative / Urobili: 0.2 E.U./dL   Blood: x / Protein: 30 mg/dL / Nitrite: NEGATIVE   Leuk Esterase: Small / RBC: 5-10 /HPF / WBC 5-10 /HPF   Sq Epi: x / Non Sq Epi: 0-5 /HPF / Bacteria: Present /HPF        RADIOLOGY & ADDITIONAL TESTS:   OVERNIGHT EVENTS:    No acute events overnight. Patient endorses decreased sensation of dysuria, denies suprapubic pain, flank pain or fever. Reports being comfortable with no new complaints.       VITAL SIGNS:  T(F): 100 (21 @ 06:06)  HR: 72 (21 @ 06:06)  BP: 106/63 (21 @ 06:06)  RR: 18 (21 @ 06:06)  SpO2: 96% (21 @ 06:06)  Wt(kg): --    Input & Output:    21 @ 07:01  -  21 @ 07:00  --------------------------------------------------------  IN: 1000 mL / OUT: 100 mL / NET: 900 mL        PHYSICAL EXAM:  General: Sitting upright in bed, appears comfortable. Has pleasant mood and appropriate affect.  Neurological: AAOx3, no focal deficits, essential tremor B/L UE   HEENT: NC/AT; EOMI, PERRL, clear conjunctiva, no nasal or oropharyngeal discharge or exudates, MMM  Neck: supple  Cardiovascular: +S1/S2, no murmurs/rubs/gallops, RRR  Respiratory: CTA B/L, no diminished breath sounds, no wheezes/rales/rhonchi, no increased work of breathing or accessory muscle use  Gastrointestinal: soft, NT/ND; active BSx4 quadrants  Genitourinary: no suprapubic tenderness, no CVA tenderness  Extremities: WWP; no edema, clubbing or cyanosis  Vascular: 2+ radial  Skin: no rashes      MEDICATIONS  (STANDING):  allopurinol 100 milliGRAM(s) Oral every 24 hours  aMIOdarone    Tablet 200 milliGRAM(s) Oral daily  apixaban 2.5 milliGRAM(s) Oral every 12 hours  atorvastatin 20 milliGRAM(s) Oral at bedtime  cefTRIAXone   IVPB 1000 milliGRAM(s) IV Intermittent every 24 hours  dextrose 40% Gel 15 Gram(s) Oral once  dextrose 5%. 1000 milliLiter(s) (50 mL/Hr) IV Continuous <Continuous>  dextrose 5%. 1000 milliLiter(s) (100 mL/Hr) IV Continuous <Continuous>  dextrose 50% Injectable 25 Gram(s) IV Push once  dextrose 50% Injectable 12.5 Gram(s) IV Push once  dextrose 50% Injectable 25 Gram(s) IV Push once  glucagon  Injectable 1 milliGRAM(s) IntraMuscular once  insulin lispro (ADMELOG) corrective regimen sliding scale   SubCutaneous Before meals and at bedtime  metoprolol succinate ER 25 milliGRAM(s) Oral daily  pantoprazole    Tablet 40 milliGRAM(s) Oral before breakfast    MEDICATIONS  (PRN):  acetaminophen   Tablet .. 650 milliGRAM(s) Oral every 6 hours PRN Temp greater or equal to 38C (100.4F), Moderate Pain (4 - 6)      Allergies    penicillin (Other (Mild))  sulfa drugs (Unknown)    Intolerances        LABS:                        9.7    15.73 )-----------( 190      ( 2021 10:28 )             31.9         136  |  98  |  30<H>  ----------------------------<  226<H>  5.3   |  25  |  1.50<H>    Ca    9.0      2021 10:28    TPro  7.6  /  Alb  4.2  /  TBili  0.8  /  DBili  x   /  AST  22  /  ALT  10  /  AlkPhos  70  -    PT/INR - ( 2021 10:28 )   PT: 14.5 sec;   INR: 1.22          PTT - ( 2021 10:28 )  PTT:26.9 sec  Urinalysis Basic - ( 2021 11:28 )    Color: Yellow / Appearance: Clear / S.020 / pH: x  Gluc: x / Ketone: Trace mg/dL  / Bili: Negative / Urobili: 0.2 E.U./dL   Blood: x / Protein: 30 mg/dL / Nitrite: NEGATIVE   Leuk Esterase: Small / RBC: 5-10 /HPF / WBC 5-10 /HPF   Sq Epi: x / Non Sq Epi: 0-5 /HPF / Bacteria: Present /HPF        RADIOLOGY & ADDITIONAL TESTS: OVERNIGHT EVENTS:  No acute events overnight. Patient endorses decreased sensation of dysuria, denies suprapubic pain, flank pain or fever. Reports being comfortable with no new complaints. Pt to be evaluated by PT today.      VITAL SIGNS:  T(F): 100 (21 @ 06:06)  HR: 72 (21 @ 06:06)  BP: 106/63 (21 @ 06:06)  RR: 18 (21 @ 06:06)  SpO2: 96% (21 @ 06:06)  Wt(kg): --    Input & Output:    21 @ 07:01  -  21 @ 07:00  --------------------------------------------------------  IN: 1000 mL / OUT: 100 mL / NET: 900 mL        PHYSICAL EXAM:  General: Sitting upright in bed, appears comfortable. Has pleasant mood and appropriate affect.  Neurological: AAOx3, no focal deficits, essential tremor B/L UE   HEENT: NC/AT; EOMI, PERRL, clear conjunctiva, no nasal or oropharyngeal discharge or exudates, MMM  Neck: supple  Cardiovascular: +S1/S2, no murmurs/rubs/gallops, RRR  Respiratory: CTA B/L, no diminished breath sounds, no wheezes/rales/rhonchi, no increased work of breathing or accessory muscle use  Gastrointestinal: soft, NT/ND; active BSx4 quadrants  Genitourinary: no suprapubic tenderness, no CVA tenderness  Extremities: WWP; no edema, clubbing or cyanosis  Vascular: 2+ radial  Skin: no rashes      MEDICATIONS  (STANDING):  allopurinol 100 milliGRAM(s) Oral every 24 hours  aMIOdarone    Tablet 200 milliGRAM(s) Oral daily  apixaban 2.5 milliGRAM(s) Oral every 12 hours  atorvastatin 20 milliGRAM(s) Oral at bedtime  cefTRIAXone   IVPB 1000 milliGRAM(s) IV Intermittent every 24 hours  dextrose 40% Gel 15 Gram(s) Oral once  dextrose 5%. 1000 milliLiter(s) (50 mL/Hr) IV Continuous <Continuous>  dextrose 5%. 1000 milliLiter(s) (100 mL/Hr) IV Continuous <Continuous>  dextrose 50% Injectable 25 Gram(s) IV Push once  dextrose 50% Injectable 12.5 Gram(s) IV Push once  dextrose 50% Injectable 25 Gram(s) IV Push once  glucagon  Injectable 1 milliGRAM(s) IntraMuscular once  insulin lispro (ADMELOG) corrective regimen sliding scale   SubCutaneous Before meals and at bedtime  metoprolol succinate ER 25 milliGRAM(s) Oral daily  pantoprazole    Tablet 40 milliGRAM(s) Oral before breakfast    MEDICATIONS  (PRN):  acetaminophen   Tablet .. 650 milliGRAM(s) Oral every 6 hours PRN Temp greater or equal to 38C (100.4F), Moderate Pain (4 - 6)      Allergies    penicillin (Other (Mild))  sulfa drugs (Unknown)    Intolerances        LABS:                        9.7    15.73 )-----------( 190      ( 2021 10:28 )             31.9         136  |  98  |  30<H>  ----------------------------<  226<H>  5.3   |  25  |  1.50<H>    Ca    9.0      2021 10:28    TPro  7.6  /  Alb  4.2  /  TBili  0.8  /  DBili  x   /  AST  22  /  ALT  10  /  AlkPhos  70  -    PT/INR - ( 2021 10:28 )   PT: 14.5 sec;   INR: 1.22          PTT - ( 2021 10:28 )  PTT:26.9 sec  Urinalysis Basic - ( 2021 11:28 )    Color: Yellow / Appearance: Clear / S.020 / pH: x  Gluc: x / Ketone: Trace mg/dL  / Bili: Negative / Urobili: 0.2 E.U./dL   Blood: x / Protein: 30 mg/dL / Nitrite: NEGATIVE   Leuk Esterase: Small / RBC: 5-10 /HPF / WBC 5-10 /HPF   Sq Epi: x / Non Sq Epi: 0-5 /HPF / Bacteria: Present /HPF        RADIOLOGY & ADDITIONAL TESTS:

## 2021-01-26 NOTE — PHYSICAL THERAPY INITIAL EVALUATION ADULT - PERTINENT HX OF CURRENT PROBLEM, REHAB EVAL
86M with a h/o Afib (on Eliquis and Amiodarone), Gout (on Allopurinol chronically), diastolic HF, HTN, HLD, CKD III, GERD, and TIIDM, who p/w dysuria and fever one day after being discharged from [facility]. He was briefly at [facility] due to anemia and a blood transfusion, and had a temporary pollock placed and removed (unclear why). Since going home, he has been experiencing dysuria. See H&P for full HPI.

## 2021-01-27 ENCOUNTER — TRANSCRIPTION ENCOUNTER (OUTPATIENT)
Age: 86
End: 2021-01-27

## 2021-01-27 DIAGNOSIS — N17.9 ACUTE KIDNEY FAILURE, UNSPECIFIED: ICD-10-CM

## 2021-01-27 LAB
ALBUMIN SERPL ELPH-MCNC: 3.1 G/DL — LOW (ref 3.3–5)
ALP SERPL-CCNC: 74 U/L — SIGNIFICANT CHANGE UP (ref 40–120)
ALT FLD-CCNC: 8 U/L — LOW (ref 10–45)
ANION GAP SERPL CALC-SCNC: 14 MMOL/L — SIGNIFICANT CHANGE UP (ref 5–17)
ANION GAP SERPL CALC-SCNC: 14 MMOL/L — SIGNIFICANT CHANGE UP (ref 5–17)
AST SERPL-CCNC: 12 U/L — SIGNIFICANT CHANGE UP (ref 10–40)
BASOPHILS # BLD AUTO: 0.03 K/UL — SIGNIFICANT CHANGE UP (ref 0–0.2)
BASOPHILS NFR BLD AUTO: 0.2 % — SIGNIFICANT CHANGE UP (ref 0–2)
BILIRUB SERPL-MCNC: 0.5 MG/DL — SIGNIFICANT CHANGE UP (ref 0.2–1.2)
BUN SERPL-MCNC: 45 MG/DL — HIGH (ref 7–23)
BUN SERPL-MCNC: 49 MG/DL — HIGH (ref 7–23)
CALCIUM SERPL-MCNC: 7.9 MG/DL — LOW (ref 8.4–10.5)
CALCIUM SERPL-MCNC: 8 MG/DL — LOW (ref 8.4–10.5)
CHLORIDE SERPL-SCNC: 105 MMOL/L — SIGNIFICANT CHANGE UP (ref 96–108)
CHLORIDE SERPL-SCNC: 108 MMOL/L — SIGNIFICANT CHANGE UP (ref 96–108)
CO2 SERPL-SCNC: 18 MMOL/L — LOW (ref 22–31)
CO2 SERPL-SCNC: 19 MMOL/L — LOW (ref 22–31)
CREAT ?TM UR-MCNC: 62 MG/DL — SIGNIFICANT CHANGE UP
CREAT SERPL-MCNC: 2.48 MG/DL — HIGH (ref 0.5–1.3)
CREAT SERPL-MCNC: 2.5 MG/DL — HIGH (ref 0.5–1.3)
EOSINOPHIL # BLD AUTO: 0.03 K/UL — SIGNIFICANT CHANGE UP (ref 0–0.5)
EOSINOPHIL NFR BLD AUTO: 0.2 % — SIGNIFICANT CHANGE UP (ref 0–6)
GLUCOSE BLDC GLUCOMTR-MCNC: 173 MG/DL — HIGH (ref 70–99)
GLUCOSE BLDC GLUCOMTR-MCNC: 185 MG/DL — HIGH (ref 70–99)
GLUCOSE BLDC GLUCOMTR-MCNC: 193 MG/DL — HIGH (ref 70–99)
GLUCOSE BLDC GLUCOMTR-MCNC: 211 MG/DL — HIGH (ref 70–99)
GLUCOSE SERPL-MCNC: 169 MG/DL — HIGH (ref 70–99)
GLUCOSE SERPL-MCNC: 197 MG/DL — HIGH (ref 70–99)
HCT VFR BLD CALC: 25.6 % — LOW (ref 39–50)
HCT VFR BLD CALC: 26 % — LOW (ref 39–50)
HGB BLD-MCNC: 7.6 G/DL — LOW (ref 13–17)
HGB BLD-MCNC: 7.8 G/DL — LOW (ref 13–17)
IMM GRANULOCYTES NFR BLD AUTO: 0.6 % — SIGNIFICANT CHANGE UP (ref 0–1.5)
LYMPHOCYTES # BLD AUTO: 0.69 K/UL — LOW (ref 1–3.3)
LYMPHOCYTES # BLD AUTO: 4.4 % — LOW (ref 13–44)
MAGNESIUM SERPL-MCNC: 2.6 MG/DL — SIGNIFICANT CHANGE UP (ref 1.6–2.6)
MCHC RBC-ENTMCNC: 28 PG — SIGNIFICANT CHANGE UP (ref 27–34)
MCHC RBC-ENTMCNC: 28 PG — SIGNIFICANT CHANGE UP (ref 27–34)
MCHC RBC-ENTMCNC: 29.7 GM/DL — LOW (ref 32–36)
MCHC RBC-ENTMCNC: 30 GM/DL — LOW (ref 32–36)
MCV RBC AUTO: 93.2 FL — SIGNIFICANT CHANGE UP (ref 80–100)
MCV RBC AUTO: 94.5 FL — SIGNIFICANT CHANGE UP (ref 80–100)
MONOCYTES # BLD AUTO: 1.41 K/UL — HIGH (ref 0–0.9)
MONOCYTES NFR BLD AUTO: 9.1 % — SIGNIFICANT CHANGE UP (ref 2–14)
NEUTROPHILS # BLD AUTO: 13.25 K/UL — HIGH (ref 1.8–7.4)
NEUTROPHILS NFR BLD AUTO: 85.5 % — HIGH (ref 43–77)
NRBC # BLD: 0 /100 WBCS — SIGNIFICANT CHANGE UP (ref 0–0)
NRBC # BLD: 0 /100 WBCS — SIGNIFICANT CHANGE UP (ref 0–0)
OSMOLALITY UR: 341 MOSM/KG — SIGNIFICANT CHANGE UP (ref 300–900)
PHOSPHATE SERPL-MCNC: 3.9 MG/DL — SIGNIFICANT CHANGE UP (ref 2.5–4.5)
PLATELET # BLD AUTO: 136 K/UL — LOW (ref 150–400)
PLATELET # BLD AUTO: 136 K/UL — LOW (ref 150–400)
POTASSIUM SERPL-MCNC: 4.4 MMOL/L — SIGNIFICANT CHANGE UP (ref 3.5–5.3)
POTASSIUM SERPL-MCNC: 5.1 MMOL/L — SIGNIFICANT CHANGE UP (ref 3.5–5.3)
POTASSIUM SERPL-SCNC: 4.4 MMOL/L — SIGNIFICANT CHANGE UP (ref 3.5–5.3)
POTASSIUM SERPL-SCNC: 5.1 MMOL/L — SIGNIFICANT CHANGE UP (ref 3.5–5.3)
PROT SERPL-MCNC: 6.3 G/DL — SIGNIFICANT CHANGE UP (ref 6–8.3)
RBC # BLD: 2.71 M/UL — LOW (ref 4.2–5.8)
RBC # BLD: 2.79 M/UL — LOW (ref 4.2–5.8)
RBC # FLD: 15.3 % — HIGH (ref 10.3–14.5)
RBC # FLD: 15.3 % — HIGH (ref 10.3–14.5)
SODIUM SERPL-SCNC: 137 MMOL/L — SIGNIFICANT CHANGE UP (ref 135–145)
SODIUM SERPL-SCNC: 141 MMOL/L — SIGNIFICANT CHANGE UP (ref 135–145)
SODIUM UR-SCNC: 51 MMOL/L — SIGNIFICANT CHANGE UP
WBC # BLD: 14.58 K/UL — HIGH (ref 3.8–10.5)
WBC # BLD: 15.51 K/UL — HIGH (ref 3.8–10.5)
WBC # FLD AUTO: 14.58 K/UL — HIGH (ref 3.8–10.5)
WBC # FLD AUTO: 15.51 K/UL — HIGH (ref 3.8–10.5)

## 2021-01-27 PROCEDURE — 99233 SBSQ HOSP IP/OBS HIGH 50: CPT | Mod: GC

## 2021-01-27 RX ADMIN — PANTOPRAZOLE SODIUM 40 MILLIGRAM(S): 20 TABLET, DELAYED RELEASE ORAL at 06:31

## 2021-01-27 RX ADMIN — SODIUM CHLORIDE 100 MILLILITER(S): 9 INJECTION INTRAMUSCULAR; INTRAVENOUS; SUBCUTANEOUS at 11:11

## 2021-01-27 RX ADMIN — Medication 2: at 18:16

## 2021-01-27 RX ADMIN — ATORVASTATIN CALCIUM 20 MILLIGRAM(S): 80 TABLET, FILM COATED ORAL at 22:30

## 2021-01-27 RX ADMIN — Medication 25 MILLIGRAM(S): at 06:31

## 2021-01-27 RX ADMIN — Medication 100 MILLIGRAM(S): at 14:23

## 2021-01-27 RX ADMIN — AMIODARONE HYDROCHLORIDE 200 MILLIGRAM(S): 400 TABLET ORAL at 06:31

## 2021-01-27 RX ADMIN — Medication 2: at 22:47

## 2021-01-27 RX ADMIN — APIXABAN 2.5 MILLIGRAM(S): 2.5 TABLET, FILM COATED ORAL at 06:31

## 2021-01-27 RX ADMIN — CEFTRIAXONE 100 MILLIGRAM(S): 500 INJECTION, POWDER, FOR SOLUTION INTRAMUSCULAR; INTRAVENOUS at 11:11

## 2021-01-27 RX ADMIN — Medication 2: at 08:47

## 2021-01-27 RX ADMIN — Medication 4: at 12:12

## 2021-01-27 NOTE — CONSULT NOTE ADULT - SUBJECTIVE AND OBJECTIVE BOX
Patient is a 86y old  Male who presents with a chief complaint of UTI with Sepsis (2021 08:16)       HPI:  86M with a h/o Afib (on Eliquis and Amiodarone), Gout (on Allopurinol chronically), diastolic HF, HTN, HLD, CKD III, GERD, and TIIDM, who p/w dysuria and fever one day after being discharged from [facility]. He was briefly at [facility] due to anemia and a blood transfusion, and had a temporary pollock placed and removed (unclear why). Since going home yesterday, he has been experiencing dysuria. This AM he woke up and states the dysuria was much worse, along with lower abdo pain, fever/chills, and nausea. Additionally, his wife reports a recent positive stool guiac, colonoscopy showing polyps with some bleeding, and a capsule endoscopy recently, the results of which are still pending. His Eliquis has recently been held in setting of GIB, along with quinapril due to possible MELINDA on CKD.     In the ED, he was febrile to 102.9, with leukocytosis 15.7, and had a positive UA.  He was treated with 500 cc bolus NS, Ceftriaxone 1000mg, and Tylenol.  (2021 12:19)      PAST MEDICAL & SURGICAL HISTORY:  HLD (hyperlipidemia)    HTN (hypertension)    Afib    Gout        MEDICATIONS  (STANDING):  allopurinol 100 milliGRAM(s) Oral every 24 hours  aMIOdarone    Tablet 200 milliGRAM(s) Oral daily  apixaban 2.5 milliGRAM(s) Oral every 12 hours  atorvastatin 20 milliGRAM(s) Oral at bedtime  cefTRIAXone   IVPB 1000 milliGRAM(s) IV Intermittent every 24 hours  dextrose 40% Gel 15 Gram(s) Oral once  dextrose 5%. 1000 milliLiter(s) (50 mL/Hr) IV Continuous <Continuous>  dextrose 5%. 1000 milliLiter(s) (100 mL/Hr) IV Continuous <Continuous>  dextrose 50% Injectable 25 Gram(s) IV Push once  dextrose 50% Injectable 12.5 Gram(s) IV Push once  dextrose 50% Injectable 25 Gram(s) IV Push once  glucagon  Injectable 1 milliGRAM(s) IntraMuscular once  insulin lispro (ADMELOG) corrective regimen sliding scale   SubCutaneous Before meals and at bedtime  metoprolol succinate ER 25 milliGRAM(s) Oral daily  pantoprazole    Tablet 40 milliGRAM(s) Oral before breakfast  sodium chloride 0.9%. 1000 milliLiter(s) (100 mL/Hr) IV Continuous <Continuous>    MEDICATIONS  (PRN):  acetaminophen   Tablet .. 650 milliGRAM(s) Oral every 6 hours PRN Temp greater or equal to 38C (100.4F), Moderate Pain (4 - 6)        Social History:               -  Home Living Status:  lives with his spouse in an elevator accessible apartment building           -  Prior Home Care Services:   none    Baseline Functional Level Prior to Admission:             - ADL's/ IADL's:    patient states he is independent           - ambulatory status PTA:  walked without assistive devices    FAMILY HISTORY:      CBC Full  -  ( 2021 09:13 )  WBC Count : 21.64 K/uL  RBC Count : 2.97 M/uL  Hemoglobin : 8.2 g/dL  Hematocrit : 27.5 %  Platelet Count - Automated : 146 K/uL  Mean Cell Volume : 92.6 fl  Mean Cell Hemoglobin : 27.6 pg  Mean Cell Hemoglobin Concentration : 29.8 gm/dL  Auto Neutrophil # : 18.55 K/uL  Auto Lymphocyte # : 0.86 K/uL  Auto Monocyte # : 1.86 K/uL  Auto Eosinophil # : 0.01 K/uL  Auto Basophil # : 0.07 K/uL  Auto Neutrophil % : 85.8 %  Auto Lymphocyte % : 4.0 %  Auto Monocyte % : 8.6 %  Auto Eosinophil % : 0.0 %  Auto Basophil % : 0.3 %          137  |  103  |  30<H>  ----------------------------<  203<H>  4.5   |  22  |  1.67<H>    Ca    8.1<L>      2021 09:13  Phos  3.5       Mg     1.4         TPro  6.5  /  Alb  3.5  /  TBili  0.9  /  DBili  0.2  /  AST  13  /  ALT  7<L>  /  AlkPhos  57        Urinalysis Basic - ( 2021 11:28 )    Color: Yellow / Appearance: Clear / S.020 / pH: x  Gluc: x / Ketone: Trace mg/dL  / Bili: Negative / Urobili: 0.2 E.U./dL   Blood: x / Protein: 30 mg/dL / Nitrite: NEGATIVE   Leuk Esterase: Small / RBC: 5-10 /HPF / WBC 5-10 /HPF   Sq Epi: x / Non Sq Epi: 0-5 /HPF / Bacteria: Present /HPF          Radiology:    < from: Xray Chest 1 View AP/PA (21 @ 11:04) >  EXAM:  XR CHEST AP OR PA 1V                          PROCEDURE DATE:  2021          INTERPRETATION:  Clinical history/reason for exam: Pneumonia.    Frontal chest.    No comparison.    Findings/  impression: Heart size within normal limits, thoracic aortic calcification. Bilateral opacities. Thoracic spine and bilateral shoulder degenerative changes. Elevation of the right hemidiaphragm.                  Vital Signs Last 24 Hrs  T(C): 37 (2021 06:30), Max: 37.6 (2021 15:00)  T(F): 98.6 (2021 06:30), Max: 99.7 (2021 15:00)  HR: 86 (2021 06:30) (81 - 95)  BP: 122/70 (2021 06:30) (119/66 - 135/66)  BP(mean): --  RR: 18 (2021 06:30) (18 - 18)  SpO2: 96% (2021 06:30) (94% - 97%)    REVIEW OF SYSTEMS:    CONSTITUTIONAL: No fever, weight loss, or fatigue  EYES: No eye pain, visual disturbances, or discharge  ENMT:  No difficulty hearing, tinnitus, vertigo; No sinus or throat pain  NECK: No pain or stiffness  BREASTS: No pain, masses, or nipple discharge  RESPIRATORY: No cough, wheezing, chills or hemoptysis; No shortness of breath  CARDIOVASCULAR: No chest pain, palpitations, dizziness, or leg swelling  GASTROINTESTINAL: No abdominal or epigastric pain. No nausea, vomiting, or hematemesis; No diarrhea or constipation. No melena or hematochezia.  GENITOURINARY: No dysuria, frequency, hematuria, or incontinence  NEUROLOGICAL: No headaches, memory loss, loss of strength, numbness, or tremors  SKIN: No itching, burning, rashes, or lesions   LYMPH NODES: No enlarged glands  ENDOCRINE: No heat or cold intolerance; No hair loss  MUSCULOSKELETAL: No joint pain or swelling; No muscle, back, or extremity pain  PSYCHIATRIC: No depression, anxiety, mood swings, or difficulty sleeping  HEME/LYMPH: No easy bruising, or bleeding gums  ALLERGY AND IMMUNOLOGIC: No hives or eczema  VASCULAR: no swelling, erythema,   : dysuria/ fever        Physical Exam:  obese 85 yo  gentleman lying in semi Drake's position, c/o feeling tired    Head: normocephalic, atraumatic    Eyes: PERRLA, EOMI, no nystagmus, sclera anicteric    ENT: nasal discharge, uvula midline, no oropharyngeal erythema/exudate    Neck: supple, negative JVD, negative carotid bruits, no thyromegaly    Chest: CTA bilaterally, neg wheeze/ rhonchi/ rales/ crackles/ egophany    Cardiovascular: regular rate and rhythm, neg murmurs/rubs/gallops    Abdomen: soft, non obese, non tender to palpation in all 4 quadrants, negative rebound/guarding, normal bowel sounds    Extremities: WWP, neg cyanosis/clubbing/edema, negative calf tenderness to palpation, negative Shazia's sign    Musculoskeletal:    Skin:      :     Neurologic Exam:      Motor Exam:    Right UE:    no focal weakness > 4/5    Left UE:      no focal weakness > 4/5      Right LE:   no focal weakness > 4/5    Left LE:    no focal weakness > 4/5                   Sensation: Intact to light touch x 4 extremities                                         DTR:                        biceps/brachioradialis: equal bilaterally                patella/ankle: equal bilaterally                  neg clonus          neg Babinski                          Gait:  not tested        PM&R Impression:    1) deconditioned  2) no focal weakness  3) sepsis/ UTI    Plan: cleared to begin rehab    1) Physical therapy focusing on therapeutic exercises, bed mobility/transfer out of bed evaluation, progressive ambulation with assistive devices prn.    2) Anticipated Disposition Plan/Recs:    d/c home with home physical therapy

## 2021-01-27 NOTE — DISCHARGE NOTE PROVIDER - NSDCCPCAREPLAN_GEN_ALL_CORE_FT
PRINCIPAL DISCHARGE DIAGNOSIS  Diagnosis: UTI (urinary tract infection)  Assessment and Plan of Treatment: You were admitted to the hospital and found to have an infection of your urinary tract. This was treated with IV antibiotics, and you are now safe to go home with oral antibiotics. You will take the oral antibiotic Cefpodoxime for 5 more days, 1/29-2/2. It is very improtant that you finish this medication. If you experience an inability to urinate, difficulty urinating, pain or burning with urination, or notice blood in your urine please ask for help to the nearest ED.      SECONDARY DISCHARGE DIAGNOSES  Diagnosis: CKD (chronic kidney disease)  Assessment and Plan of Treatment: You have a known history of kidney disease which was effected by the infection in your urine. Now that the infection is being managed, your kidneys are almost back to their baseline level of function. It is very important that you continue to drink fluids and take your medications for hypertension and for the infection.    Diagnosis: Afib  Assessment and Plan of Treatment: You have a known history of Atril fibrillation, which is an abnormal heart rhythm, for which you take the medication Eliquis. This had been held out of concern for a possible bleed, however you are now safe to restart your eliquis and continue to take it as prescribed.

## 2021-01-27 NOTE — PROGRESS NOTE ADULT - ATTENDING COMMENTS
Patient seen and examined at bedside. Agree with exam, a/p as above with the following addendum/edits:     Feels well, urinary symptoms resolved. Had some back pain improved w/ tylenol. On exam, no RP hematoma, no suprapubic tenderness and no CVA tenderness. His WBC has started to decrease but still elevated. His Cr surprisingly increased to 2.50 today even with extra IVF given yesterday, urine lytes showed a pre-renal etiology. Will check RUQ US to f/u any obstruction or stones as well as abscess. He appears clinically improved. He had a recent pRBC tx at OSH and Hgb has downtrended here but no signs of obvious bleed, likely dilutional. Will hold Eliquis to be cautious and trend Hgb, risk of stroke is low if holding ELiquis for short period of time. Son is a [facility] physician in [place], care discussed over the phone.    Of note, was to get COVID vaccine today. Wife tried to reschedule but was unable to. Will try to reach [facility] team to see if there are any availabilities for him.
Patient seen and examined at bedside. Agree with exam, a/p as above with the following addendum/edits:     Clinically feels better today, less fatigued, afebrile, dysuria improving. His WBC went up to 21K but otherwise he is improved. No CVA tenderness on exam, no suprapubic tenderness. Urine culture contaminated unfortunately to help identify bacteria. C/w ceftriaxone for now, if worsening leukocytosis or continued fevers will image for lack of source control. Mild bump in Cr and urine still quite dark so will c/w IVF. He was also scheduled to get the COVID vaccine tomorrow but will not be able to, his wife is to see if he can delay his shot.

## 2021-01-27 NOTE — DISCHARGE NOTE PROVIDER - CARE PROVIDER_API CALL
Rosales Moore  [id number]  [address] [place] [id number]  Phone: [phone]  Fax: [phone]  Established Patient  Scheduled Appointment: 02/03/2021 10:40 AM

## 2021-01-27 NOTE — CONSULT NOTE ADULT - ASSESSMENT
per Internal Medicine    87 yo M with a h/o Afib (on Eliquis and Amiodarone), Gout (on Allopurinol chronically), diastolic HF, HTN, HLD, CKD III, GERD, and TIIDM (diet controlled) who presented to the ED due to dysuria and fever one day after being discharged from [facility]. In the ED he was found to be febrile, tachycardic, with leukocytosis and positive UA. He was treated with 500 cc NS, 1g Ceftriaxone and Tylenol and admitted to the [facility]. Patient is in stable condition, improving symptomatically on current antibiotic regimen but has uptrending leukocytosis (21.64 <- 15.73) and creatinine (1.67<-1.5). His Eliquis has recently been held in setting of GIB, along with quinapril due to possible MELINDA on CKD.     Problem/Plan - 1:  ·  Problem: Sepsis due to urinary tract infection.  Plan: Presented with sepsis: febrile, leukocytosis, tachycardic.  Recent admission at [facility], discharged day before presentation. Had temporary pollock catheter during time at [facility] (unclear why), now presenting with complaints of dysuria. U/a showing trace ketones, small leuk esterase, wbc and rbc, moderate blood and bateria.   - bladder scan not showing retention  - s/p Ceftriaxone 1000 mg in ED  - c/w Ceftriaxone 1000 mg to 1/31 for total 7 days (need to reorder last day).     Problem/Plan - 2:  ·  Problem: Afib.  Plan: - C/w amiodarone  - f/u pharmacy and/or outpatient provider re: Eliquis. It was previously held 2/2 possible GIB.  - Eliquis restarted  - Scheduled for cardioversion on 2/4 @ [facility].     Problem/Plan - 3:  ·  Problem: CKD (chronic kidney disease).  Plan: - holding quinapril/lisinopril i/s/o possible MELINDA on CKD  - renally dose meds  - avoid nephrotoxic agents  - holding lasix i/s/o sepsis and CKD.     Problem/Plan - 4:  ·  Problem: Diastolic heart failure.  Plan: - holding Lasix i/s/o sepsis.     Problem/Plan - 5:  ·  Problem: Gout.  Plan: -c/w allopurinol, per outpatient.     Problem/Plan - 6:  Problem: Diabetes. Plan: Not well controlled, a1c this admission 8  - Insulin Sliding Scale  - CC diet.    Problem/Plan - 7:  ·  Problem: HTN (hypertension).  Plan: - c/w Metoprolol, per outpatient meds  - patient currently normotensive.     Problem/Plan - 8:  ·  Problem: HLD (hyperlipidemia).  Plan: - c/w Atorvastatin, per outpatient meds.     Problem/Plan - 9:  ·  Problem: GERD (gastroesophageal reflux disease).  Plan: - c/w Protonix, per outpatient meds.     Problem/Plan - 10:  Problem: Prophylactic measure. Plan; F: 100 cc/hr NS  E: Replete electrolytes as needed, K>4, M>2  N: CC  DVT ppx: SCDs, Eliquis  GI ppx: Protonix  Dispo: [facility].

## 2021-01-27 NOTE — PROGRESS NOTE ADULT - PROBLEM SELECTOR PLAN 10
F: 100 cc/hr NS  E: Replete electrolytes as needed, K>4, M>2  N: CC  DVT ppx: SCDs, Eliquis  GI ppx: Protonix  Dispo: [facility]

## 2021-01-27 NOTE — PROGRESS NOTE ADULT - PROBLEM SELECTOR PLAN 2
- C/w amiodarone  - f/u pharmacy and/or outpatient provider re: Eliquis. It was previously held 2/2 possible GIB.  - Eliquis restarted  - Scheduled for cardioversion on 2/4 @ [facility] - C/w amiodarone  - f/u pharmacy and/or outpatient provider re: Eliquis. It was previously held 2/2 possible GIB.  - Eliquis restarted 1/25  - Scheduled for cardioversion on 2/4 @ [facility] MELINDA on CKD with increase in Cr from 1.67 to 2.5 in 24 hrs  - f/u urine lytes  - continue NS at 100 cc/hr  - UOP currently 8 cc/kg/hr  - holding quinapril/lisinopril   - renally dose meds  - avoid nephrotoxic agents  - holding lasix i/s/o sepsis and CKD

## 2021-01-27 NOTE — PROGRESS NOTE ADULT - ASSESSMENT
86M with a h/o Afib (on Eliquis and Amiodarone), Gout (on Allopurinol chronically), diastolic HF, HTN, HLD, CKD III, GERD, and TIIDM (diet controlled) who presented to the ED due to dysuria and fever one day after being discharged from [facility]. In the ED he was found to be febrile, tachycardic, with leukocytosis and positive UA. He was treated with 500 cc NS, 1g Ceftriaxone and Tylenol and admitted to the [facility]. Patient is in stable condition, improving symptomatically on current antibiotic regimen but has uptrending leukocytosis (21.64 <- 15.73) and creatinine (1.67<-1.5). His Eliquis has recently been held in setting of GIB, along with quinapril due to possible MELINDA on CKD.  86M with a h/o Afib (on Eliquis and Amiodarone), Gout (on Allopurinol chronically), diastolic HF, HTN, HLD, CKD III, GERD, and TIIDM (diet controlled) who was admitted to the [facility] due to UTI with sepsis. Remains stable and asymptotic on Ceftriaxone 1g q 12. Pending morning labs *** 86M with a h/o Afib (on Eliquis and Amiodarone), Gout (on Allopurinol chronically), diastolic HF, HTN, HLD, CKD III, GERD, and TIIDM (diet controlled) who was admitted to the [facility] due to UTI with sepsis. Given stable vitals, decreasing WBC (21.64 -> 15.51), and resolution of symptoms patient no longer meets sepsis criteria and likely has improving UTI. Labs are however notable for worsening renal function (Cr 2.5 was 1.5 on admission).

## 2021-01-27 NOTE — DISCHARGE NOTE PROVIDER - PROVIDER TOKENS
PROVIDER:[TOKEN:[[id number]],SCHEDULEDAPPT:[02/03/2021],SCHEDULEDAPPTTIME:[10:40 AM],ESTABLISHEDPATIENT:[T]]

## 2021-01-27 NOTE — PROGRESS NOTE ADULT - SUBJECTIVE AND OBJECTIVE BOX
***INCOMPLETE****  INTERVAL HPI/OVERNIGHT EVENTS:      On further ROS, patient did not have complaint of: Headache, Blurred Vision, Cough, Dyspnea, Palpitations, Abdominal Pain, N/V, Weakness, Change in Sensation.     VITAL SIGNS:  T(F): 98.6 (21 @ 06:30)  HR: 86 (21 @ 06:30)  BP: 122/70 (21 @ 06:30)  RR: 18 (21 @ 06:30)  SpO2: 96% (21 @ 06:30)  Wt(kg): --    Input & Output:    21 @ 07:01  -  21 @ 07:00  --------------------------------------------------------  IN: 700 mL / OUT: 850 mL / NET: -150 mL    21 @ 07:01  -  21 @ 08:17  --------------------------------------------------------  IN: 0 mL / OUT: 500 mL / NET: -500 mL        PHYSICAL EXAM:  General: Comfortable, pleasant/anxious/agitated, Ill-appearing, well-nourished/frail/cachectic, comfortable / in distress  Neurological: AAOx3, no focal deficits  HEENT: NC/AT; EOMI, PERRL, clear conjunctiva, no nasal or oropharyngeal discharge or exudates, MMM  Neck: supple, no cervical or post-auricular lymphadenopathy  Cardiovascular: +S1/S2, no murmurs/rubs/gallops, RRR  Respiratory: CTA B/L, no diminished breath sounds, no wheezes/rales/rhonchi, no increased work of breathing or accessory muscle use  Gastrointestinal: soft, NT/ND; active BSx4 quadrants  Genitourinary: no suprapubic tenderness, no CVA tenderness  Extremities: WWP; no edema, clubbing or cyanosis  Vascular: 2+ radial, DP/PT pulses B/L  Skin: no rashes  Lines/Drains:     MEDICATIONS  (STANDING):  allopurinol 100 milliGRAM(s) Oral every 24 hours  aMIOdarone    Tablet 200 milliGRAM(s) Oral daily  apixaban 2.5 milliGRAM(s) Oral every 12 hours  atorvastatin 20 milliGRAM(s) Oral at bedtime  cefTRIAXone   IVPB 1000 milliGRAM(s) IV Intermittent every 24 hours  dextrose 40% Gel 15 Gram(s) Oral once  dextrose 5%. 1000 milliLiter(s) (50 mL/Hr) IV Continuous <Continuous>  dextrose 5%. 1000 milliLiter(s) (100 mL/Hr) IV Continuous <Continuous>  dextrose 50% Injectable 25 Gram(s) IV Push once  dextrose 50% Injectable 12.5 Gram(s) IV Push once  dextrose 50% Injectable 25 Gram(s) IV Push once  glucagon  Injectable 1 milliGRAM(s) IntraMuscular once  insulin lispro (ADMELOG) corrective regimen sliding scale   SubCutaneous Before meals and at bedtime  metoprolol succinate ER 25 milliGRAM(s) Oral daily  pantoprazole    Tablet 40 milliGRAM(s) Oral before breakfast  sodium chloride 0.9%. 1000 milliLiter(s) (100 mL/Hr) IV Continuous <Continuous>    MEDICATIONS  (PRN):  acetaminophen   Tablet .. 650 milliGRAM(s) Oral every 6 hours PRN Temp greater or equal to 38C (100.4F), Moderate Pain (4 - 6)      Allergies    penicillin (Other (Mild))  sulfa drugs (Unknown)    Intolerances        LABS:                        8.2    21.64 )-----------( 146      ( 2021 09:13 )             27.5         137  |  103  |  30<H>  ----------------------------<  203<H>  4.5   |  22  |  1.67<H>    Ca    8.1<L>      2021 09:13  Phos  3.5       Mg     1.4         TPro  6.5  /  Alb  3.5  /  TBili  0.9  /  DBili  0.2  /  AST  13  /  ALT  7<L>  /  AlkPhos  57      PT/INR - ( 2021 10:28 )   PT: 14.5 sec;   INR: 1.22          PTT - ( 2021 10:28 )  PTT:26.9 sec  Urinalysis Basic - ( 2021 11:28 )    Color: Yellow / Appearance: Clear / S.020 / pH: x  Gluc: x / Ketone: Trace mg/dL  / Bili: Negative / Urobili: 0.2 E.U./dL   Blood: x / Protein: 30 mg/dL / Nitrite: NEGATIVE   Leuk Esterase: Small / RBC: 5-10 /HPF / WBC 5-10 /HPF   Sq Epi: x / Non Sq Epi: 0-5 /HPF / Bacteria: Present /HPF        RADIOLOGY & ADDITIONAL TESTS:     INTERVAL HPI/OVERNIGHT EVENTS: Patient reports doing well overnight, dysuria has resolved. He endorses improving energy levels and decreased fatigue. He does not report suprapubic pain, flank pain, fever, nausea or vomiting. Seen by PT yesterday with recommends for balance and strength training.         VITAL SIGNS:  T(F): 98.6 (21 @ 06:30)  HR: 86 (21 @ 06:30)  BP: 122/70 (21 @ 06:30)  RR: 18 (21 @ 06:30)  SpO2: 96% (21 @ 06:30)  Wt(kg): --    Input & Output:    21 @ 07:  -  21 @ 07:00  --------------------------------------------------------  IN: 700 mL / OUT: 850 mL / NET: -150 mL    21 @ 07:  -  21 @ 08:17  --------------------------------------------------------  IN: 0 mL / OUT: 500 mL / NET: -500 mL        PHYSICAL EXAM:  General: Laying comfortably in bed. Pleasant mood and appropriate affect.   Neurological: AAOx3, no focal deficits  HEENT: NC/AT; EOMI, PERRL, clear conjunctiva, no nasal or oropharyngeal discharge or exudates, MMM  Neck: supple  Cardiovascular: +S1/S2, no murmurs/rubs/gallops, RRR  Respiratory: CTA B/L, no diminished breath sounds, no wheezes/rales/rhonchi, no increased work of breathing or accessory muscle use  Gastrointestinal: soft, NT/ND; active BS  Genitourinary: no suprapubic tenderness, no CVA tenderness  Extremities: WWP; no edema, clubbing or cyanosis  Vascular: 2+ radial  Skin: no rashes  Lines/Drains: PIV in both antecubital fossa    MEDICATIONS  (STANDING):  allopurinol 100 milliGRAM(s) Oral every 24 hours  aMIOdarone    Tablet 200 milliGRAM(s) Oral daily  apixaban 2.5 milliGRAM(s) Oral every 12 hours  atorvastatin 20 milliGRAM(s) Oral at bedtime  cefTRIAXone   IVPB 1000 milliGRAM(s) IV Intermittent every 24 hours  dextrose 40% Gel 15 Gram(s) Oral once  dextrose 5%. 1000 milliLiter(s) (50 mL/Hr) IV Continuous <Continuous>  dextrose 5%. 1000 milliLiter(s) (100 mL/Hr) IV Continuous <Continuous>  dextrose 50% Injectable 25 Gram(s) IV Push once  dextrose 50% Injectable 12.5 Gram(s) IV Push once  dextrose 50% Injectable 25 Gram(s) IV Push once  glucagon  Injectable 1 milliGRAM(s) IntraMuscular once  insulin lispro (ADMELOG) corrective regimen sliding scale   SubCutaneous Before meals and at bedtime  metoprolol succinate ER 25 milliGRAM(s) Oral daily  pantoprazole    Tablet 40 milliGRAM(s) Oral before breakfast  sodium chloride 0.9%. 1000 milliLiter(s) (100 mL/Hr) IV Continuous <Continuous>    MEDICATIONS  (PRN):  acetaminophen   Tablet .. 650 milliGRAM(s) Oral every 6 hours PRN Temp greater or equal to 38C (100.4F), Moderate Pain (4 - 6)      Allergies    penicillin (Other (Mild))  sulfa drugs (Unknown)    Intolerances        LABS:                        8.2    21.64 )-----------( 146      ( 2021 09:13 )             27.5         137  |  103  |  30<H>  ----------------------------<  203<H>  4.5   |  22  |  1.67<H>    Ca    8.1<L>      2021 09:13  Phos  3.5       Mg     1.4         TPro  6.5  /  Alb  3.5  /  TBili  0.9  /  DBili  0.2  /  AST  13  /  ALT  7<L>  /  AlkPhos  57      PT/INR - ( 2021 10:28 )   PT: 14.5 sec;   INR: 1.22          PTT - ( 2021 10:28 )  PTT:26.9 sec  Urinalysis Basic - ( 2021 11:28 )    Color: Yellow / Appearance: Clear / S.020 / pH: x  Gluc: x / Ketone: Trace mg/dL  / Bili: Negative / Urobili: 0.2 E.U./dL   Blood: x / Protein: 30 mg/dL / Nitrite: NEGATIVE   Leuk Esterase: Small / RBC: 5-10 /HPF / WBC 5-10 /HPF   Sq Epi: x / Non Sq Epi: 0-5 /HPF / Bacteria: Present /HPF        RADIOLOGY & ADDITIONAL TESTS:   INTERVAL HPI/OVERNIGHT EVENTS: Patient reports doing well overnight, dysuria has resolved. He endorses improving energy levels and decreased fatigue. He does not report suprapubic pain, flank pain, fever, nausea or vomiting. Seen by PT yesterday with recommends for balance and strength training.         VITAL SIGNS:  T(F): 98.6 (21 @ 06:30)  HR: 86 (21 @ 06:30)  BP: 122/70 (21 @ 06:30)  RR: 18 (21 @ 06:30)  SpO2: 96% (21 @ 06:30)  Wt(kg): --    Input & Output:    21 @ 07:  -  21 @ 07:00  --------------------------------------------------------  IN: 700 mL / OUT: 850 mL / NET: -150 mL    21 @ 07:  -  21 @ 08:17  --------------------------------------------------------  IN: 0 mL / OUT: 500 mL / NET: -500 mL        PHYSICAL EXAM:  General: Laying comfortably in bed. Pleasant mood and appropriate affect.   Neurological: AAOx3, no focal deficits  HEENT: NC/AT; EOMI, PERRL, clear conjunctiva, no nasal or oropharyngeal discharge or exudates, MMM  Neck: supple  Cardiovascular: +S1/S2, no murmurs/rubs/gallops, RRR  Respiratory: CTA B/L, no diminished breath sounds, no wheezes/rales/rhonchi, no increased work of breathing or accessory muscle use  Gastrointestinal: soft, NT/ND; active BS  Genitourinary: no suprapubic tenderness, no CVA tenderness  Extremities: WWP; no edema, clubbing or cyanosis  Vascular: 2+ radial and dp pulses  Skin: no rashes  Lines/Drains: PIV in both antecubital fossa    MEDICATIONS  (STANDING):  allopurinol 100 milliGRAM(s) Oral every 24 hours  aMIOdarone    Tablet 200 milliGRAM(s) Oral daily  apixaban 2.5 milliGRAM(s) Oral every 12 hours  atorvastatin 20 milliGRAM(s) Oral at bedtime  cefTRIAXone   IVPB 1000 milliGRAM(s) IV Intermittent every 24 hours  dextrose 40% Gel 15 Gram(s) Oral once  dextrose 5%. 1000 milliLiter(s) (50 mL/Hr) IV Continuous <Continuous>  dextrose 5%. 1000 milliLiter(s) (100 mL/Hr) IV Continuous <Continuous>  dextrose 50% Injectable 25 Gram(s) IV Push once  dextrose 50% Injectable 12.5 Gram(s) IV Push once  dextrose 50% Injectable 25 Gram(s) IV Push once  glucagon  Injectable 1 milliGRAM(s) IntraMuscular once  insulin lispro (ADMELOG) corrective regimen sliding scale   SubCutaneous Before meals and at bedtime  metoprolol succinate ER 25 milliGRAM(s) Oral daily  pantoprazole    Tablet 40 milliGRAM(s) Oral before breakfast  sodium chloride 0.9%. 1000 milliLiter(s) (100 mL/Hr) IV Continuous <Continuous>    MEDICATIONS  (PRN):  acetaminophen   Tablet .. 650 milliGRAM(s) Oral every 6 hours PRN Temp greater or equal to 38C (100.4F), Moderate Pain (4 - 6)      Allergies    penicillin (Other (Mild))  sulfa drugs (Unknown)    Intolerances        LABS:                        8.2    21.64 )-----------( 146      ( 2021 09:13 )             27.5         137  |  103  |  30<H>  ----------------------------<  203<H>  4.5   |  22  |  1.67<H>    Ca    8.1<L>      2021 09:13  Phos  3.5       Mg     1.4         TPro  6.5  /  Alb  3.5  /  TBili  0.9  /  DBili  0.2  /  AST  13  /  ALT  7<L>  /  AlkPhos  57      PT/INR - ( 2021 10:28 )   PT: 14.5 sec;   INR: 1.22          PTT - ( 2021 10:28 )  PTT:26.9 sec  Urinalysis Basic - ( 2021 11:28 )    Color: Yellow / Appearance: Clear / S.020 / pH: x  Gluc: x / Ketone: Trace mg/dL  / Bili: Negative / Urobili: 0.2 E.U./dL   Blood: x / Protein: 30 mg/dL / Nitrite: NEGATIVE   Leuk Esterase: Small / RBC: 5-10 /HPF / WBC 5-10 /HPF   Sq Epi: x / Non Sq Epi: 0-5 /HPF / Bacteria: Present /HPF        RADIOLOGY & ADDITIONAL TESTS:   INTERVAL HPI/OVERNIGHT EVENTS: Patient reports doing well overnight, dysuria has resolved. He endorses improving energy levels and decreased fatigue. He does not report suprapubic pain, flank pain, fever, nausea or vomiting. Seen by PT yesterday with recommendations for balance and strength training.         VITAL SIGNS:  T(F): 98.6 (21 @ 06:30)  HR: 86 (21 @ 06:30)  BP: 122/70 (21 @ 06:30)  RR: 18 (21 @ 06:30)  SpO2: 96% (21 @ 06:30)  Wt(kg): --    Input & Output:    21 @ 07:  -  21 @ 07:00  --------------------------------------------------------  IN: 700 mL / OUT: 850 mL / NET: -150 mL    21 @ 07:  -  21 @ 08:17  --------------------------------------------------------  IN: 0 mL / OUT: 500 mL / NET: -500 mL        PHYSICAL EXAM:  General: Laying comfortably in bed. Pleasant mood and appropriate affect.   Neurological: AAOx3, no focal deficits  HEENT: NC/AT; EOMI, PERRL, clear conjunctiva, no nasal or oropharyngeal discharge or exudates, MMM  Neck: supple  Cardiovascular: +S1/S2, no murmurs/rubs/gallops, RRR  Respiratory: CTA B/L, no diminished breath sounds, no wheezes/rales/rhonchi, no increased work of breathing or accessory muscle use  Gastrointestinal: soft, NT/ND; active BS  Genitourinary: no suprapubic tenderness, no CVA tenderness  Extremities: WWP; no edema, clubbing or cyanosis  Vascular: 2+ radial and dp pulses  Skin: no rashes  Lines/Drains: PIV in both antecubital fossa    MEDICATIONS  (STANDING):  allopurinol 100 milliGRAM(s) Oral every 24 hours  aMIOdarone    Tablet 200 milliGRAM(s) Oral daily  apixaban 2.5 milliGRAM(s) Oral every 12 hours  atorvastatin 20 milliGRAM(s) Oral at bedtime  cefTRIAXone   IVPB 1000 milliGRAM(s) IV Intermittent every 24 hours  dextrose 40% Gel 15 Gram(s) Oral once  dextrose 5%. 1000 milliLiter(s) (50 mL/Hr) IV Continuous <Continuous>  dextrose 5%. 1000 milliLiter(s) (100 mL/Hr) IV Continuous <Continuous>  dextrose 50% Injectable 25 Gram(s) IV Push once  dextrose 50% Injectable 12.5 Gram(s) IV Push once  dextrose 50% Injectable 25 Gram(s) IV Push once  glucagon  Injectable 1 milliGRAM(s) IntraMuscular once  insulin lispro (ADMELOG) corrective regimen sliding scale   SubCutaneous Before meals and at bedtime  metoprolol succinate ER 25 milliGRAM(s) Oral daily  pantoprazole    Tablet 40 milliGRAM(s) Oral before breakfast  sodium chloride 0.9%. 1000 milliLiter(s) (100 mL/Hr) IV Continuous <Continuous>    MEDICATIONS  (PRN):  acetaminophen   Tablet .. 650 milliGRAM(s) Oral every 6 hours PRN Temp greater or equal to 38C (100.4F), Moderate Pain (4 - 6)      Allergies    penicillin (Other (Mild))  sulfa drugs (Unknown)    Intolerances        LABS:                        8.2    21.64 )-----------( 146      ( 2021 09:13 )             27.5         137  |  103  |  30<H>  ----------------------------<  203<H>  4.5   |  22  |  1.67<H>    Ca    8.1<L>      2021 09:13  Phos  3.5       Mg     1.4         TPro  6.5  /  Alb  3.5  /  TBili  0.9  /  DBili  0.2  /  AST  13  /  ALT  7<L>  /  AlkPhos  57      PT/INR - ( 2021 10:28 )   PT: 14.5 sec;   INR: 1.22          PTT - ( 2021 10:28 )  PTT:26.9 sec  Urinalysis Basic - ( 2021 11:28 )    Color: Yellow / Appearance: Clear / S.020 / pH: x  Gluc: x / Ketone: Trace mg/dL  / Bili: Negative / Urobili: 0.2 E.U./dL   Blood: x / Protein: 30 mg/dL / Nitrite: NEGATIVE   Leuk Esterase: Small / RBC: 5-10 /HPF / WBC 5-10 /HPF   Sq Epi: x / Non Sq Epi: 0-5 /HPF / Bacteria: Present /HPF        RADIOLOGY & ADDITIONAL TESTS:   INTERVAL HPI/OVERNIGHT EVENTS: Patient reports doing well overnight, dysuria has resolved, UOP appropriate o/n. He endorses improving energy levels and decreased fatigue. He does not report suprapubic pain, flank pain, fever, nausea or vomiting. Seen by PT yesterday with recommendations for balance and strength training.         VITAL SIGNS:  T(F): 98.6 (21 @ 06:30)  HR: 86 (21 @ 06:30)  BP: 122/70 (21 @ 06:30)  RR: 18 (21 @ 06:30)  SpO2: 96% (21 @ 06:30)  Wt(kg): --    Input & Output:    21 @ 07:  -  21 @ 07:00  --------------------------------------------------------  IN: 700 mL / OUT: 850 mL / NET: -150 mL    21 @ 07:  -  21 @ 08:17  --------------------------------------------------------  IN: 0 mL / OUT: 500 mL / NET: -500 mL        PHYSICAL EXAM:  General: Laying comfortably in bed. Pleasant mood and appropriate affect.   Neurological: AAOx3, no focal deficits  HEENT: NC/AT; EOMI, PERRL, clear conjunctiva, no nasal or oropharyngeal discharge or exudates, MMM  Neck: supple  Cardiovascular: +S1/S2, no murmurs/rubs/gallops, RRR  Respiratory: CTA B/L, no diminished breath sounds, no wheezes/rales/rhonchi, no increased work of breathing or accessory muscle use  Gastrointestinal: soft, NT/ND; active BS  Genitourinary: no suprapubic tenderness, no CVA tenderness  Extremities: WWP; no edema, clubbing or cyanosis  Vascular: 2+ radial and dp pulses  Skin: no rashes  Lines/Drains: PIV in both antecubital fossa    MEDICATIONS  (STANDING):  allopurinol 100 milliGRAM(s) Oral every 24 hours  aMIOdarone    Tablet 200 milliGRAM(s) Oral daily  apixaban 2.5 milliGRAM(s) Oral every 12 hours  atorvastatin 20 milliGRAM(s) Oral at bedtime  cefTRIAXone   IVPB 1000 milliGRAM(s) IV Intermittent every 24 hours  dextrose 40% Gel 15 Gram(s) Oral once  dextrose 5%. 1000 milliLiter(s) (50 mL/Hr) IV Continuous <Continuous>  dextrose 5%. 1000 milliLiter(s) (100 mL/Hr) IV Continuous <Continuous>  dextrose 50% Injectable 25 Gram(s) IV Push once  dextrose 50% Injectable 12.5 Gram(s) IV Push once  dextrose 50% Injectable 25 Gram(s) IV Push once  glucagon  Injectable 1 milliGRAM(s) IntraMuscular once  insulin lispro (ADMELOG) corrective regimen sliding scale   SubCutaneous Before meals and at bedtime  metoprolol succinate ER 25 milliGRAM(s) Oral daily  pantoprazole    Tablet 40 milliGRAM(s) Oral before breakfast  sodium chloride 0.9%. 1000 milliLiter(s) (100 mL/Hr) IV Continuous <Continuous>    MEDICATIONS  (PRN):  acetaminophen   Tablet .. 650 milliGRAM(s) Oral every 6 hours PRN Temp greater or equal to 38C (100.4F), Moderate Pain (4 - 6)      Allergies    penicillin (Other (Mild))  sulfa drugs (Unknown)    Intolerances        LABS:                        8.2    21.64 )-----------( 146      ( 2021 09:13 )             27.5         137  |  103  |  30<H>  ----------------------------<  203<H>  4.5   |  22  |  1.67<H>    Ca    8.1<L>      2021 09:13  Phos  3.5       Mg     1.4         TPro  6.5  /  Alb  3.5  /  TBili  0.9  /  DBili  0.2  /  AST  13  /  ALT  7<L>  /  AlkPhos  57      PT/INR - ( 2021 10:28 )   PT: 14.5 sec;   INR: 1.22          PTT - ( 2021 10:28 )  PTT:26.9 sec  Urinalysis Basic - ( 2021 11:28 )    Color: Yellow / Appearance: Clear / S.020 / pH: x  Gluc: x / Ketone: Trace mg/dL  / Bili: Negative / Urobili: 0.2 E.U./dL   Blood: x / Protein: 30 mg/dL / Nitrite: NEGATIVE   Leuk Esterase: Small / RBC: 5-10 /HPF / WBC 5-10 /HPF   Sq Epi: x / Non Sq Epi: 0-5 /HPF / Bacteria: Present /HPF        RADIOLOGY & ADDITIONAL TESTS:

## 2021-01-27 NOTE — PROGRESS NOTE ADULT - PROBLEM SELECTOR PLAN 3
- holding quinapril/lisinopril i/s/o possible MELINDA on CKD  - renally dose meds  - avoid nephrotoxic agents  - holding lasix i/s/o sepsis and CKD - C/w amiodarone  - f/u pharmacy and/or outpatient provider re: Eliquis. It was previously held 2/2 possible GIB.  - Eliquis restarted 1/25  - Scheduled for cardioversion on 2/4 @ [facility]

## 2021-01-27 NOTE — PROGRESS NOTE ADULT - PROBLEM SELECTOR PLAN 1
Presented with sepsis: febrile, leukocytosis, tachycardic.  Recent admission at [facility], discharged day before presentation. Had temporary pollock catheter during time at [facility] (unclear why), now presenting with complaints of dysuria. U/a showing trace ketones, small leuk esterase, wbc and rbc, moderate blood and bateria.   - bladder scan not showing retention  - s/p Ceftriaxone 1000 mg in ED  - c/w Ceftriaxone 1000 mg to 1/31 for total 7 days (need to reorder last day) Presented with sepsis: febrile, leukocytosis, tachycardic.  Recent admission at [facility], discharged day before presentation. Had temporary pollock catheter during time at [facility] (unclear why), now presenting with complaints of dysuria. U/a showing trace ketones, small leuk esterase, wbc and rbc, moderate blood and bacteria.   - bladder scan not showing retention  - s/p Ceftriaxone 1000 mg in ED  - c/w Ceftriaxone 1000 mg to 1/31 for total 7 days (need to reorder last day)

## 2021-01-27 NOTE — DISCHARGE NOTE PROVIDER - HOSPITAL COURSE
***INCOMPLETE***  #Discharge: do not delete    Patient is __ yo M/F with past medical history of _____ admitted for _____, found to have _____    (Example)   35 yo M with PMH of HTN, DM admitted with sepsis 2/2 LLE cellulitis, complicated by MELINDA, stable for DC home with PO Keflex to complete by 6/12/19.)    Problem List/Main Diagnoses (system-based):     (Example:)  1. Cellulitis: Left lower extremity, improved during stay w/ ABx Tx    2. MELINDA: stabilized with IVF    3. HTN: No change to regimen    4: DM: No change to regimen      Inpatient treatment course: (Example): IV Vancomycin and Zosyn on admission; IVF     New medications: (Example): 5 day course of Keflex until 6/12/2019     Labs to be followed outpatient: (Example): WBC, BMP    Exam to be followed outpatient: (Example): Check LLE swelling/erythema, outlined with skin marker ***INCOMPLETE***  #Discharge: do not delete    86M with a h/o Afib (on Eliquis and Amiodarone), Gout (on Allopurinol chronically), diastolic HF, HTN, HLD, CKD III, GERD, and TIIDM (diet controlled) who was admitted to the [facility] due to UTI with sepsis. Given stable vitals, decreasing WBC (21.64 -> 15.51), and resolution of symptoms patient no longer meets sepsis criteria and likely has improving UTI. Labs are however notable for worsening renal function (Cr 2.5 was 1.5 on admission).      Problem/Plan - 1:  ·  Problem: Sepsis due to urinary tract infection.  Plan: Presented with sepsis: febrile, leukocytosis, tachycardic.  Recent admission at [facility], discharged day before presentation. Had temporary pollock catheter during time at [facility] (unclear why), now presenting with complaints of dysuria. U/a showing trace ketones, small leuk esterase, wbc and rbc, moderate blood and bacteria.   - bladder scan not showing retention  - s/p Ceftriaxone 1000 mg in ED  - c/w Ceftriaxone 1000 mg to 1/31 for total 7 days (need to reorder last day).      Problem/Plan - 2:  ·  Problem: MELINDA (acute kidney injury).  Plan: MELINDA on CKD with increase in Cr from 1.67 to 2.5 in 24 hrs  - f/u urine lytes  - continue NS at 100 cc/hr  - UOP currently 8 cc/kg/hr  - holding quinapril/lisinopril   - renally dose meds  - avoid nephrotoxic agents  - holding lasix i/s/o sepsis and CKD.      Problem/Plan - 3:  ·  Problem: Afib.  Plan: - C/w amiodarone  - f/u pharmacy and/or outpatient provider re: Eliquis. It was previously held 2/2 possible GIB.  - Eliquis restarted 1/25  - Scheduled for cardioversion on 2/4 @ [facility].      Problem/Plan - 4:  ·  Problem: Diastolic heart failure.  Plan: - holding Lasix i/s/o sepsis.      Problem/Plan - 5:  ·  Problem: Gout.  Plan: -c/w allopurinol, per outpatient.      Problem/Plan - 6:  Problem: Diabetes. Plan: Not well controlled, a1c this admission 8  - Insulin Sliding Scale  - CC diet.     Problem/Plan - 7:  ·  Problem: HTN (hypertension).  Plan: - c/w Metoprolol, per outpatient meds  - patient currently normotensive.      Problem/Plan - 8:  ·  Problem: HLD (hyperlipidemia).  Plan: - c/w Atorvastatin, per outpatient meds.      Problem/Plan - 9:  ·  Problem: GERD (gastroesophageal reflux disease).  Plan: - c/w Protonix, per outpatient meds.           Inpatient treatment course: (Example): IV Vancomycin and Zosyn on admission; IVF     New medications: (Example): 5 day course of Keflex until 6/12/2019     Labs to be followed outpatient: (Example): WBC, BMP    Exam to be followed outpatient: (Example): Check LLE swelling/erythema, outlined with skin marker 86M with a h/o Afib (on Eliquis and Amiodarone), Gout (on Allopurinol chronically), diastolic HF, HTN, HLD, CKD III, GERD, and TIIDM (diet controlled) who was admitted to the [facility] due to UTI with sepsis. Given stable vitals, decreasing WBC (21.64 -> 15.51), and resolution of symptoms patient no longer meets sepsis criteria and likely has improving UTI. Labs are however notable for worsening renal function (Cr 2.5 was 1.5 on admission).     1. Sepsis due to urinary tract infection: Presented febrile, leukocytosis, tachycardic with dirty UA, BCx negative. Recent admission at [facility] w/ temporary pollock catheter for unclear reason (pt states he was suresh they needed strict I's/O's), discharged day before presentation, now presents with complaints of dysuria. U/a showing trace ketones, small leuk esterase, wbc and rbc, moderate blood and bacteria. Bladder scan without retention, UOP throughout this admission >0.5 cc/kg/hr. Treated with Ceftriaxone 1 g daily, stable for d/c with cefpodoxime to complete 7 day course total.    2. MELINDA (acute kidney injury): MELINDA on CKD with increase in Cr from 1.67 to 2.5 in 24 hrs, Prerenal with urine lytes, given NS at 100 cc/hr with UOP appropriate at 6-8 cc/kg/hr this admission. Held quinipril/lisinopril, renally dosed meds, held lasix. Cr downtrending and pt stable for d/c home.    3. Afib: C/w  home amiodarone, but held Eliquis. Pt now stable to restart Eliquis on discharge.    4. Diastolic heart failure: Pt on lasix outpatient, holding i/s/o sepsis.     5. Gout: Pt with known hx of gout, c/w outpatient allopurinol this admission and on discharge.     6. Diabetes: Not well controlled, a1c this admission 8, kept on ISS and carb control diet. Pt to follow up with PCP for medication trial    7. HTN (hypertension): Will c/w home Metoprolol this admission and on discharge     8. HLD (hyperlipidemia): Will c/w home atorvastatin this admission and on discharge     9. GERD (gastroesophageal reflux disease): Will c/w home protonix this admission and on discharge            Inpatient treatment course: IV abx for UTI, IVF fluids for MELINDA     New medications: Cefpodoxime 100mg daily 1/29-2/2     Labs to be followed outpatient: None    Exam to be followed outpatient: None 86M with a h/o Afib (on Eliquis and Amiodarone), Gout (on Allopurinol chronically), diastolic HF, HTN, HLD, CKD III, GERD, and TIIDM (diet controlled) who was admitted to the [facility] due to UTI with sepsis. Now stable for d/c home with continuation of Cefpodoxime for 5 more days.    1. Sepsis due to urinary tract infection: Presented febrile, leukocytosis, tachycardic with dirty UA, BCx negative. Recent admission at OSH w/ temporary pollock catheter for unclear reason (pt states he was suresh they needed strict I's/O's), discharged day before presentation, now presents with complaints of dysuria. U/a showing trace ketones, small leuk esterase, wbc and rbc, moderate blood and bacteria. Bladder scan without retention, UOP throughout this admission >0.5 cc/kg/hr. Treated with Ceftriaxone 1 g daily, stable for d/c with cefpodoxime to complete 7 day course total.    2. MELINDA (acute kidney injury): MELINDA on CKD with increase in Cr from 1.67 to 2.5 in 24 hrs, Prerenal with urine lytes, given NS at 100 cc/hr with UOP appropriate at 6-8 cc/kg/hr this admission. Held quinipril/lisinopril, renally dosed meds, held lasix. Cr downtrending and pt stable for d/c home.    3. Afib: C/w  home amiodarone, but held Eliquis. Pt now stable to restart Eliquis on discharge.    4. Diastolic heart failure: Pt on lasix outpatient, holding i/s/o sepsis.     5. Gout: Pt with known hx of gout, c/w outpatient allopurinol this admission and on discharge.     6. Diabetes: Not well controlled, a1c this admission 8, kept on ISS and carb control diet. Pt to follow up with PCP 2/3/21    7. HTN (hypertension): Will c/w home Metoprolol this admission and on discharge     8. HLD (hyperlipidemia): Will c/w home atorvastatin this admission and on discharge     9. GERD (gastroesophageal reflux disease): Will c/w home protonix this admission and on discharge            Inpatient treatment course: IV abx for UTI, IVF fluids for MELINDA     New medications: Cefpodoxime 100mg daily 1/29-2/2     Labs to be followed outpatient: None    Exam to be followed outpatient: None

## 2021-01-27 NOTE — DISCHARGE NOTE PROVIDER - NSDCMRMEDTOKEN_GEN_ALL_CORE_FT
ALLOPURINOL 100 MG TABLET:   AMIODARONE  MG TABLET:   ATORVASTATIN 20 MG TABLET:   ELIQUIS 5 MG TABLET:   FUROSEMIDE 20 MG TABLET:   METOPROLOL ER SUCCINATE 25MG TABS: TAKE ONE TABLET BY MOUTH EVERY DAY  PANTOPRAZOLE 40MG TABLETS:   QUINAPRIL 10 MG TABLET:    ALLOPURINOL 100 MG TABLET:   AMIODARONE  MG TABLET:   ATORVASTATIN 20 MG TABLET:   cefpodoxime 100 mg oral tablet: 1 tab(s) orally every 24 hours 1/29-2/2  ELIQUIS 5 MG TABLET: 0.5 tab(s) orally 2 times a day  FUROSEMIDE 20 MG TABLET:   METOPROLOL ER SUCCINATE 25MG TABS: TAKE ONE TABLET BY MOUTH EVERY DAY  PANTOPRAZOLE 40MG TABLETS:   QUINAPRIL 10 MG TABLET:

## 2021-01-27 NOTE — PROGRESS NOTE ADULT - PROBLEM SELECTOR PLAN 7
- c/w Metoprolol, per outpatient meds  - patient currently normotensive
- c/w Metoprolol, per outpatient meds  - patient currently normotensive

## 2021-01-28 ENCOUNTER — TRANSCRIPTION ENCOUNTER (OUTPATIENT)
Age: 86
End: 2021-01-28

## 2021-01-28 VITALS
SYSTOLIC BLOOD PRESSURE: 147 MMHG | HEART RATE: 83 BPM | OXYGEN SATURATION: 97 % | DIASTOLIC BLOOD PRESSURE: 84 MMHG | TEMPERATURE: 97 F | RESPIRATION RATE: 17 BRPM

## 2021-01-28 DIAGNOSIS — D64.9 ANEMIA, UNSPECIFIED: ICD-10-CM

## 2021-01-28 LAB
ALBUMIN SERPL ELPH-MCNC: 3 G/DL — LOW (ref 3.3–5)
ALP SERPL-CCNC: 87 U/L — SIGNIFICANT CHANGE UP (ref 40–120)
ALT FLD-CCNC: 12 U/L — SIGNIFICANT CHANGE UP (ref 10–45)
ANION GAP SERPL CALC-SCNC: 12 MMOL/L — SIGNIFICANT CHANGE UP (ref 5–17)
AST SERPL-CCNC: 16 U/L — SIGNIFICANT CHANGE UP (ref 10–40)
BASOPHILS # BLD AUTO: 0.02 K/UL — SIGNIFICANT CHANGE UP (ref 0–0.2)
BASOPHILS NFR BLD AUTO: 0.2 % — SIGNIFICANT CHANGE UP (ref 0–2)
BILIRUB SERPL-MCNC: 0.4 MG/DL — SIGNIFICANT CHANGE UP (ref 0.2–1.2)
BUN SERPL-MCNC: 44 MG/DL — HIGH (ref 7–23)
CALCIUM SERPL-MCNC: 8.4 MG/DL — SIGNIFICANT CHANGE UP (ref 8.4–10.5)
CHLORIDE SERPL-SCNC: 107 MMOL/L — SIGNIFICANT CHANGE UP (ref 96–108)
CO2 SERPL-SCNC: 20 MMOL/L — LOW (ref 22–31)
CREAT SERPL-MCNC: 2.01 MG/DL — HIGH (ref 0.5–1.3)
EOSINOPHIL # BLD AUTO: 0.08 K/UL — SIGNIFICANT CHANGE UP (ref 0–0.5)
EOSINOPHIL NFR BLD AUTO: 0.8 % — SIGNIFICANT CHANGE UP (ref 0–6)
GLUCOSE BLDC GLUCOMTR-MCNC: 174 MG/DL — HIGH (ref 70–99)
GLUCOSE BLDC GLUCOMTR-MCNC: 187 MG/DL — HIGH (ref 70–99)
GLUCOSE BLDC GLUCOMTR-MCNC: 217 MG/DL — HIGH (ref 70–99)
GLUCOSE SERPL-MCNC: 152 MG/DL — HIGH (ref 70–99)
HCT VFR BLD CALC: 26.1 % — LOW (ref 39–50)
HGB BLD-MCNC: 7.6 G/DL — LOW (ref 13–17)
IMM GRANULOCYTES NFR BLD AUTO: 0.7 % — SIGNIFICANT CHANGE UP (ref 0–1.5)
LYMPHOCYTES # BLD AUTO: 0.98 K/UL — LOW (ref 1–3.3)
LYMPHOCYTES # BLD AUTO: 9.3 % — LOW (ref 13–44)
MAGNESIUM SERPL-MCNC: 2.4 MG/DL — SIGNIFICANT CHANGE UP (ref 1.6–2.6)
MCHC RBC-ENTMCNC: 27.4 PG — SIGNIFICANT CHANGE UP (ref 27–34)
MCHC RBC-ENTMCNC: 29.1 GM/DL — LOW (ref 32–36)
MCV RBC AUTO: 94.2 FL — SIGNIFICANT CHANGE UP (ref 80–100)
MONOCYTES # BLD AUTO: 1.09 K/UL — HIGH (ref 0–0.9)
MONOCYTES NFR BLD AUTO: 10.3 % — SIGNIFICANT CHANGE UP (ref 2–14)
NEUTROPHILS # BLD AUTO: 8.3 K/UL — HIGH (ref 1.8–7.4)
NEUTROPHILS NFR BLD AUTO: 78.7 % — HIGH (ref 43–77)
NRBC # BLD: 0 /100 WBCS — SIGNIFICANT CHANGE UP (ref 0–0)
PHOSPHATE SERPL-MCNC: 3.6 MG/DL — SIGNIFICANT CHANGE UP (ref 2.5–4.5)
PLATELET # BLD AUTO: 130 K/UL — LOW (ref 150–400)
POTASSIUM SERPL-MCNC: 4.3 MMOL/L — SIGNIFICANT CHANGE UP (ref 3.5–5.3)
POTASSIUM SERPL-SCNC: 4.3 MMOL/L — SIGNIFICANT CHANGE UP (ref 3.5–5.3)
PROT SERPL-MCNC: 6.3 G/DL — SIGNIFICANT CHANGE UP (ref 6–8.3)
RBC # BLD: 2.77 M/UL — LOW (ref 4.2–5.8)
RBC # FLD: 15.2 % — HIGH (ref 10.3–14.5)
SODIUM SERPL-SCNC: 139 MMOL/L — SIGNIFICANT CHANGE UP (ref 135–145)
WBC # BLD: 10.54 K/UL — HIGH (ref 3.8–10.5)
WBC # FLD AUTO: 10.54 K/UL — HIGH (ref 3.8–10.5)

## 2021-01-28 PROCEDURE — 80053 COMPREHEN METABOLIC PANEL: CPT

## 2021-01-28 PROCEDURE — 82962 GLUCOSE BLOOD TEST: CPT

## 2021-01-28 PROCEDURE — 84300 ASSAY OF URINE SODIUM: CPT

## 2021-01-28 PROCEDURE — 83615 LACTATE (LD) (LDH) ENZYME: CPT

## 2021-01-28 PROCEDURE — 83036 HEMOGLOBIN GLYCOSYLATED A1C: CPT

## 2021-01-28 PROCEDURE — 51798 US URINE CAPACITY MEASURE: CPT

## 2021-01-28 PROCEDURE — 85027 COMPLETE CBC AUTOMATED: CPT

## 2021-01-28 PROCEDURE — U0005: CPT

## 2021-01-28 PROCEDURE — 97161 PT EVAL LOW COMPLEX 20 MIN: CPT

## 2021-01-28 PROCEDURE — 93005 ELECTROCARDIOGRAM TRACING: CPT

## 2021-01-28 PROCEDURE — 84145 PROCALCITONIN (PCT): CPT

## 2021-01-28 PROCEDURE — 85730 THROMBOPLASTIN TIME PARTIAL: CPT

## 2021-01-28 PROCEDURE — 71045 X-RAY EXAM CHEST 1 VIEW: CPT

## 2021-01-28 PROCEDURE — 83605 ASSAY OF LACTIC ACID: CPT

## 2021-01-28 PROCEDURE — U0003: CPT

## 2021-01-28 PROCEDURE — 83735 ASSAY OF MAGNESIUM: CPT

## 2021-01-28 PROCEDURE — 99285 EMERGENCY DEPT VISIT HI MDM: CPT | Mod: 25

## 2021-01-28 PROCEDURE — 96374 THER/PROPH/DIAG INJ IV PUSH: CPT

## 2021-01-28 PROCEDURE — 99239 HOSP IP/OBS DSCHRG MGMT >30: CPT

## 2021-01-28 PROCEDURE — 83935 ASSAY OF URINE OSMOLALITY: CPT

## 2021-01-28 PROCEDURE — 85610 PROTHROMBIN TIME: CPT

## 2021-01-28 PROCEDURE — 84100 ASSAY OF PHOSPHORUS: CPT

## 2021-01-28 PROCEDURE — 81001 URINALYSIS AUTO W/SCOPE: CPT

## 2021-01-28 PROCEDURE — 85025 COMPLETE CBC W/AUTO DIFF WBC: CPT

## 2021-01-28 PROCEDURE — 76770 US EXAM ABDO BACK WALL COMP: CPT | Mod: 26

## 2021-01-28 PROCEDURE — 76770 US EXAM ABDO BACK WALL COMP: CPT

## 2021-01-28 PROCEDURE — 36415 COLL VENOUS BLD VENIPUNCTURE: CPT

## 2021-01-28 PROCEDURE — 97116 GAIT TRAINING THERAPY: CPT

## 2021-01-28 PROCEDURE — 82570 ASSAY OF URINE CREATININE: CPT

## 2021-01-28 PROCEDURE — 87086 URINE CULTURE/COLONY COUNT: CPT

## 2021-01-28 PROCEDURE — 87040 BLOOD CULTURE FOR BACTERIA: CPT

## 2021-01-28 PROCEDURE — 83880 ASSAY OF NATRIURETIC PEPTIDE: CPT

## 2021-01-28 PROCEDURE — 80048 BASIC METABOLIC PNL TOTAL CA: CPT

## 2021-01-28 PROCEDURE — 82248 BILIRUBIN DIRECT: CPT

## 2021-01-28 RX ORDER — APIXABAN 2.5 MG/1
2.5 TABLET, FILM COATED ORAL ONCE
Refills: 0 | Status: COMPLETED | OUTPATIENT
Start: 2021-01-28 | End: 2021-01-28

## 2021-01-28 RX ORDER — APIXABAN 2.5 MG/1
0 TABLET, FILM COATED ORAL
Qty: 0 | Refills: 0 | DISCHARGE

## 2021-01-28 RX ORDER — CEFPODOXIME PROXETIL 100 MG
100 TABLET ORAL EVERY 24 HOURS
Refills: 0 | Status: DISCONTINUED | OUTPATIENT
Start: 2021-01-28 | End: 2021-01-28

## 2021-01-28 RX ORDER — APIXABAN 2.5 MG/1
5 TABLET, FILM COATED ORAL ONCE
Refills: 0 | Status: DISCONTINUED | OUTPATIENT
Start: 2021-01-28 | End: 2021-01-28

## 2021-01-28 RX ADMIN — Medication 25 MILLIGRAM(S): at 05:43

## 2021-01-28 RX ADMIN — PANTOPRAZOLE SODIUM 40 MILLIGRAM(S): 20 TABLET, DELAYED RELEASE ORAL at 05:42

## 2021-01-28 RX ADMIN — Medication 100 MILLIGRAM(S): at 13:55

## 2021-01-28 RX ADMIN — AMIODARONE HYDROCHLORIDE 200 MILLIGRAM(S): 400 TABLET ORAL at 05:42

## 2021-01-28 RX ADMIN — Medication 4: at 09:12

## 2021-01-28 RX ADMIN — Medication 100 MILLIGRAM(S): at 16:32

## 2021-01-28 RX ADMIN — APIXABAN 2.5 MILLIGRAM(S): 2.5 TABLET, FILM COATED ORAL at 13:55

## 2021-01-28 NOTE — DISCHARGE NOTE NURSING/CASE MANAGEMENT/SOCIAL WORK - NSDCPNINST_GEN_ALL_CORE
Due to pandemic corona virus- please continue proper handwashing/ hand hygiene, wearing mask, social distancing.

## 2021-01-28 NOTE — DISCHARGE NOTE NURSING/CASE MANAGEMENT/SOCIAL WORK - PATIENT PORTAL LINK FT
You can access the FollowMyHealth Patient Portal offered by [facility] by registering at the following website: [email or web address]. By joining [employer]’s FollowMyHealth portal, you will also be able to view your health information using other applications (apps) compatible with our system.

## 2021-01-29 RX ORDER — CEFPODOXIME PROXETIL 100 MG
1 TABLET ORAL
Qty: 5 | Refills: 0
Start: 2021-01-29 | End: 2021-02-02

## 2021-01-30 LAB
CULTURE RESULTS: SIGNIFICANT CHANGE UP
CULTURE RESULTS: SIGNIFICANT CHANGE UP
SPECIMEN SOURCE: SIGNIFICANT CHANGE UP
SPECIMEN SOURCE: SIGNIFICANT CHANGE UP

## 2021-02-03 DIAGNOSIS — K21.9 GASTRO-ESOPHAGEAL REFLUX DISEASE WITHOUT ESOPHAGITIS: ICD-10-CM

## 2021-02-03 DIAGNOSIS — N39.0 URINARY TRACT INFECTION, SITE NOT SPECIFIED: ICD-10-CM

## 2021-02-03 DIAGNOSIS — M10.9 GOUT, UNSPECIFIED: ICD-10-CM

## 2021-02-03 DIAGNOSIS — Z88.2 ALLERGY STATUS TO SULFONAMIDES: ICD-10-CM

## 2021-02-03 DIAGNOSIS — N17.9 ACUTE KIDNEY FAILURE, UNSPECIFIED: ICD-10-CM

## 2021-02-03 DIAGNOSIS — E78.5 HYPERLIPIDEMIA, UNSPECIFIED: ICD-10-CM

## 2021-02-03 DIAGNOSIS — Z88.0 ALLERGY STATUS TO PENICILLIN: ICD-10-CM

## 2021-02-03 DIAGNOSIS — N18.30 CHRONIC KIDNEY DISEASE, STAGE 3 UNSPECIFIED: ICD-10-CM

## 2021-02-03 DIAGNOSIS — I50.32 CHRONIC DIASTOLIC (CONGESTIVE) HEART FAILURE: ICD-10-CM

## 2021-02-03 DIAGNOSIS — I48.91 UNSPECIFIED ATRIAL FIBRILLATION: ICD-10-CM

## 2021-02-03 DIAGNOSIS — A41.9 SEPSIS, UNSPECIFIED ORGANISM: ICD-10-CM

## 2021-02-03 DIAGNOSIS — R53.1 WEAKNESS: ICD-10-CM

## 2021-02-03 DIAGNOSIS — E11.22 TYPE 2 DIABETES MELLITUS WITH DIABETIC CHRONIC KIDNEY DISEASE: ICD-10-CM

## 2021-02-03 DIAGNOSIS — I13.0 HYPERTENSIVE HEART AND CHRONIC KIDNEY DISEASE WITH HEART FAILURE AND STAGE 1 THROUGH STAGE 4 CHRONIC KIDNEY DISEASE, OR UNSPECIFIED CHRONIC KIDNEY DISEASE: ICD-10-CM

## 2021-02-05 ENCOUNTER — EMERGENCY (EMERGENCY)
Facility: HOSPITAL | Age: 86
LOS: 1 days | Discharge: HOME | End: 2021-02-05
Attending: EMERGENCY MEDICINE | Admitting: EMERGENCY MEDICINE
Payer: MEDICARE

## 2021-02-05 VITALS
OXYGEN SATURATION: 98 % | SYSTOLIC BLOOD PRESSURE: 169 MMHG | RESPIRATION RATE: 18 BRPM | TEMPERATURE: 98 F | HEIGHT: 69 IN | WEIGHT: 199.96 LBS | HEART RATE: 70 BPM | DIASTOLIC BLOOD PRESSURE: 60 MMHG

## 2021-02-05 DIAGNOSIS — D64.9 ANEMIA, UNSPECIFIED: ICD-10-CM

## 2021-02-05 DIAGNOSIS — E11.9 TYPE 2 DIABETES MELLITUS WITHOUT COMPLICATIONS: ICD-10-CM

## 2021-02-05 DIAGNOSIS — Z20.822 CONTACT WITH AND (SUSPECTED) EXPOSURE TO COVID-19: ICD-10-CM

## 2021-02-05 DIAGNOSIS — R53.1 WEAKNESS: ICD-10-CM

## 2021-02-05 LAB
ALBUMIN SERPL ELPH-MCNC: 4 G/DL — SIGNIFICANT CHANGE UP (ref 3.3–5)
ALP SERPL-CCNC: 73 U/L — SIGNIFICANT CHANGE UP (ref 40–120)
ALT FLD-CCNC: 17 U/L — SIGNIFICANT CHANGE UP (ref 10–45)
ANION GAP SERPL CALC-SCNC: 12 MMOL/L — SIGNIFICANT CHANGE UP (ref 5–17)
APPEARANCE UR: CLEAR — SIGNIFICANT CHANGE UP
APTT BLD: 35.2 SEC — SIGNIFICANT CHANGE UP (ref 27.5–35.5)
AST SERPL-CCNC: 19 U/L — SIGNIFICANT CHANGE UP (ref 10–40)
BACTERIA # UR AUTO: PRESENT /HPF
BASOPHILS # BLD AUTO: 0.01 K/UL — SIGNIFICANT CHANGE UP (ref 0–0.2)
BASOPHILS NFR BLD AUTO: 0.1 % — SIGNIFICANT CHANGE UP (ref 0–2)
BILIRUB SERPL-MCNC: 0.6 MG/DL — SIGNIFICANT CHANGE UP (ref 0.2–1.2)
BILIRUB UR-MCNC: NEGATIVE — SIGNIFICANT CHANGE UP
BLD GP AB SCN SERPL QL: NEGATIVE — SIGNIFICANT CHANGE UP
BLD GP AB SCN SERPL QL: NEGATIVE — SIGNIFICANT CHANGE UP
BUN SERPL-MCNC: 40 MG/DL — HIGH (ref 7–23)
CALCIUM SERPL-MCNC: 9.3 MG/DL — SIGNIFICANT CHANGE UP (ref 8.4–10.5)
CHLORIDE SERPL-SCNC: 101 MMOL/L — SIGNIFICANT CHANGE UP (ref 96–108)
CO2 SERPL-SCNC: 24 MMOL/L — SIGNIFICANT CHANGE UP (ref 22–31)
COLOR SPEC: YELLOW — SIGNIFICANT CHANGE UP
CREAT SERPL-MCNC: 1.76 MG/DL — HIGH (ref 0.5–1.3)
DIFF PNL FLD: ABNORMAL
EOSINOPHIL # BLD AUTO: 0.03 K/UL — SIGNIFICANT CHANGE UP (ref 0–0.5)
EOSINOPHIL NFR BLD AUTO: 0.4 % — SIGNIFICANT CHANGE UP (ref 0–6)
EPI CELLS # UR: SIGNIFICANT CHANGE UP /HPF (ref 0–5)
GLUCOSE SERPL-MCNC: 193 MG/DL — HIGH (ref 70–99)
GLUCOSE UR QL: NEGATIVE — SIGNIFICANT CHANGE UP
HCT VFR BLD CALC: 25.1 % — LOW (ref 39–50)
HGB BLD-MCNC: 7.3 G/DL — LOW (ref 13–17)
HYALINE CASTS # UR AUTO: SIGNIFICANT CHANGE UP /LPF (ref 0–2)
IMM GRANULOCYTES NFR BLD AUTO: 1.4 % — SIGNIFICANT CHANGE UP (ref 0–1.5)
INR BLD: 1.95 — HIGH (ref 0.88–1.16)
KETONES UR-MCNC: NEGATIVE — SIGNIFICANT CHANGE UP
LEUKOCYTE ESTERASE UR-ACNC: NEGATIVE — SIGNIFICANT CHANGE UP
LYMPHOCYTES # BLD AUTO: 1.22 K/UL — SIGNIFICANT CHANGE UP (ref 1–3.3)
LYMPHOCYTES # BLD AUTO: 15.6 % — SIGNIFICANT CHANGE UP (ref 13–44)
MCHC RBC-ENTMCNC: 26.3 PG — LOW (ref 27–34)
MCHC RBC-ENTMCNC: 29.1 GM/DL — LOW (ref 32–36)
MCV RBC AUTO: 90.3 FL — SIGNIFICANT CHANGE UP (ref 80–100)
MONOCYTES # BLD AUTO: 0.99 K/UL — HIGH (ref 0–0.9)
MONOCYTES NFR BLD AUTO: 12.7 % — SIGNIFICANT CHANGE UP (ref 2–14)
NEUTROPHILS # BLD AUTO: 5.45 K/UL — SIGNIFICANT CHANGE UP (ref 1.8–7.4)
NEUTROPHILS NFR BLD AUTO: 69.8 % — SIGNIFICANT CHANGE UP (ref 43–77)
NITRITE UR-MCNC: NEGATIVE — SIGNIFICANT CHANGE UP
NRBC # BLD: 0 /100 WBCS — SIGNIFICANT CHANGE UP (ref 0–0)
PH UR: 5.5 — SIGNIFICANT CHANGE UP (ref 5–8)
PLATELET # BLD AUTO: 272 K/UL — SIGNIFICANT CHANGE UP (ref 150–400)
POTASSIUM SERPL-MCNC: 4.4 MMOL/L — SIGNIFICANT CHANGE UP (ref 3.5–5.3)
POTASSIUM SERPL-SCNC: 4.4 MMOL/L — SIGNIFICANT CHANGE UP (ref 3.5–5.3)
PROT SERPL-MCNC: 7.4 G/DL — SIGNIFICANT CHANGE UP (ref 6–8.3)
PROT UR-MCNC: NEGATIVE MG/DL — SIGNIFICANT CHANGE UP
PROTHROM AB SERPL-ACNC: 22.7 SEC — HIGH (ref 10.6–13.6)
RBC # BLD: 2.78 M/UL — LOW (ref 4.2–5.8)
RBC # FLD: 15.7 % — HIGH (ref 10.3–14.5)
RBC CASTS # UR COMP ASSIST: ABNORMAL /HPF
RH IG SCN BLD-IMP: POSITIVE — SIGNIFICANT CHANGE UP
RH IG SCN BLD-IMP: POSITIVE — SIGNIFICANT CHANGE UP
SARS-COV-2 RNA SPEC QL NAA+PROBE: SIGNIFICANT CHANGE UP
SODIUM SERPL-SCNC: 137 MMOL/L — SIGNIFICANT CHANGE UP (ref 135–145)
SP GR SPEC: 1.02 — SIGNIFICANT CHANGE UP (ref 1–1.03)
UROBILINOGEN FLD QL: 0.2 E.U./DL — SIGNIFICANT CHANGE UP
WBC # BLD: 7.81 K/UL — SIGNIFICANT CHANGE UP (ref 3.8–10.5)
WBC # FLD AUTO: 7.81 K/UL — SIGNIFICANT CHANGE UP (ref 3.8–10.5)
WBC UR QL: < 5 /HPF — SIGNIFICANT CHANGE UP

## 2021-02-05 PROCEDURE — 99285 EMERGENCY DEPT VISIT HI MDM: CPT | Mod: 25

## 2021-02-05 PROCEDURE — 85730 THROMBOPLASTIN TIME PARTIAL: CPT

## 2021-02-05 PROCEDURE — 80053 COMPREHEN METABOLIC PANEL: CPT

## 2021-02-05 PROCEDURE — 36415 COLL VENOUS BLD VENIPUNCTURE: CPT

## 2021-02-05 PROCEDURE — 86922 COMPATIBILITY TEST ANTIGLOB: CPT

## 2021-02-05 PROCEDURE — 93005 ELECTROCARDIOGRAM TRACING: CPT

## 2021-02-05 PROCEDURE — P9040: CPT

## 2021-02-05 PROCEDURE — U0003: CPT

## 2021-02-05 PROCEDURE — 86900 BLOOD TYPING SEROLOGIC ABO: CPT

## 2021-02-05 PROCEDURE — U0005: CPT

## 2021-02-05 PROCEDURE — 36430 TRANSFUSION BLD/BLD COMPNT: CPT

## 2021-02-05 PROCEDURE — 85610 PROTHROMBIN TIME: CPT

## 2021-02-05 PROCEDURE — 81001 URINALYSIS AUTO W/SCOPE: CPT

## 2021-02-05 PROCEDURE — 99284 EMERGENCY DEPT VISIT MOD MDM: CPT

## 2021-02-05 PROCEDURE — 86901 BLOOD TYPING SEROLOGIC RH(D): CPT

## 2021-02-05 PROCEDURE — 85025 COMPLETE CBC W/AUTO DIFF WBC: CPT

## 2021-02-05 PROCEDURE — 86850 RBC ANTIBODY SCREEN: CPT

## 2021-02-05 NOTE — ED PROVIDER NOTE - CLINICAL SUMMARY MEDICAL DECISION MAKING FREE TEXT BOX
86M with a h/o Afib (on Eliquis and Amiodarone), Gout (on Allopurinol chronically), diastolic HF, HTN, HLD, CKD III, GERD, and TIIDM (diet controlled), recent admit to [facility] due to UTI with sepsis, DC'd on Cefpodoxime for 5 more days who p/w generalized weakness and anemia to 7.5 as outpt. Per pt and wife, they are being worked up by Dr. Thompson (heme-onc) as outpt for anemia as reportedly  no gi source was found (pt had egd/colo/capsule test in end of jan), he denies black of bloody stool, no cp/sob, dizziness or syncope.  Pt only here for 1U PRBCs and then plan for DC to f/u with Dr. Thompson on Monday for continued workup. They do not want to be admitted to [facility]. I d/w Dr. Avitia covering for Dr. Thompson who agrees with plan.

## 2021-02-05 NOTE — ED PROVIDER NOTE - OBJECTIVE STATEMENT
86M with a h/o Afib (on Eliquis and Amiodarone), Gout (on Allopurinol chronically), diastolic HF, HTN, HLD, CKD III, GERD, and TIIDM (diet controlled), recent admit to [facility] due to UTI with sepsis, DC'd on Cefpodoxime for 5 more days who p/w generalized weakness and anemia to 7.5 as outpt. Per pt and wife, they are being worked up by Dr. Thompson (heme-onc) as outpt for anemia as reportedly  no gi source was found (pt had egd/colo/capsule test in end of jan), he denies black of bloody stool, no cp/sob, dizziness or syncope.

## 2021-02-05 NOTE — ED PROVIDER NOTE - NSFOLLOWUPINSTRUCTIONS_ED_ALL_ED_FT
Please follow up with Dr. Pederson on Monday for continued workup of anemia. If you have any problem getting an appointment this week, please call the ED Referral Coordinator at [phone].  Return to the Emergency Department if you have any new or worsening symptoms, or for any other concerns. Please read below for further information.    Anemia    Anemia is a condition in which the concentration of red blood cells or hemoglobin in the blood is below normal. Hemoglobin is a substance in red blood cells that carries oxygen to the tissues of the body. Anemia results in not enough oxygen reaching these tissues which can cause symptoms such as weakness, dizziness/lightheadedness, shortness of breath, chest pain, paleness, or nausea. The cause of your anemia may or may not be determined immediately. If your hemoglobin was dangerously low, you may have received a blood transfusion. Usually reactions to transfusions occur immediately but monitor yourself for any fevers, rash, or shortness of breath.    SEEK IMMEDIATE MEDICAL CARE IF YOU HAVE ANY OF THE FOLLOWING SYMPTOMS: extreme weakness/chest pain/shortness of breath, black or bloody stools, vomiting blood, fainting, fever, or any signs of dehydration.

## 2021-02-05 NOTE — ED ADULT NURSE NOTE - OBJECTIVE STATEMENT
85 y/o male here for low hemoglobin. pt states " I had blood drawn 3 days ago and my hemoglobin was 8.5 and today was 7.1. " pt c/o SOB and bilateral leg swelling. denies blood in the stool. but endorsed taking Eliquis for A-fib.

## 2021-02-05 NOTE — ED PROVIDER NOTE - CARE PROVIDER_API CALL
Chantelle Thompson  HEMATOLOGY  [address], [address]  [place] [address]  Phone: [phone]  Fax: [phone]  Follow Up Time:

## 2021-02-05 NOTE — ED ADULT TRIAGE NOTE - CHIEF COMPLAINT QUOTE
85 y/o male sent from hematologist for evaluation of drop in hemoglobin from 8.5 on 2/3/21 to 7.1 today. Pt with hx of blood in stool, reports having colonoscopy and endoscopy that did not show any bleeding. Pt with hx of afib on Eloquis, hx of cardiac ablation.

## 2021-02-05 NOTE — ED PROVIDER NOTE - PHYSICAL EXAMINATION
GEN: Well appearing, well nourished, awake, alert, oriented to person, place, time/situation and in no apparent distress. NTAF  ENT: Airway patent, Nasal mucosa clear. Mouth with normal mucosa.  EYES: Clear bilaterally. PERRL, EOMI  RESPIRATORY: Breathing comfortably with normal RR. No W/C/R, no hypoxia or resp distress.  CARDIAC: Regular rate and rhythm, no M/R/G  ABDOMEN: Soft, nontender, +bowel sounds, no rebound, rigidity, or guarding.  MSK: Range of motion is not limited, no deformities noted.  NEURO: Alert and oriented, no focal deficits.  SKIN: Skin color pale for race, warm, dry and intact. No evidence of rash. Resolving ecchymoses to skin.  PSYCH: Alert and oriented to person, place, time/situation. normal mood and affect. no apparent risk to self or others.

## 2021-02-05 NOTE — ED PROVIDER NOTE - PATIENT PORTAL LINK FT
You can access the FollowMyHealth Patient Portal offered by [facility] by registering at the following website: [email or web address]. By joining [employer]’s FollowMyHealth portal, you will also be able to view your health information using other applications (apps) compatible with our system.

## 2021-02-06 VITALS
OXYGEN SATURATION: 98 % | TEMPERATURE: 98 F | RESPIRATION RATE: 18 BRPM | DIASTOLIC BLOOD PRESSURE: 62 MMHG | HEART RATE: 65 BPM | SYSTOLIC BLOOD PRESSURE: 168 MMHG

## 2021-02-06 PROBLEM — I10 ESSENTIAL (PRIMARY) HYPERTENSION: Chronic | Status: ACTIVE | Noted: 2021-01-25

## 2021-02-06 PROBLEM — I48.91 UNSPECIFIED ATRIAL FIBRILLATION: Chronic | Status: ACTIVE | Noted: 2021-01-25

## 2021-02-06 PROBLEM — M10.9 GOUT, UNSPECIFIED: Chronic | Status: ACTIVE | Noted: 2021-01-25

## 2021-02-06 PROBLEM — E78.5 HYPERLIPIDEMIA, UNSPECIFIED: Chronic | Status: ACTIVE | Noted: 2021-01-25

## 2021-02-06 NOTE — ED ADULT NURSE REASSESSMENT NOTE - NS ED NURSE REASSESS COMMENT FT1
no signs and symptoms observed post blood transfusion. explained the need to observe for possible reaction and the son will come to pick the pt at 0600H to send home.

## 2021-03-23 ENCOUNTER — APPOINTMENT (OUTPATIENT)
Dept: OPHTHALMOLOGY | Facility: CLINIC | Age: 86
End: 2021-03-23

## 2021-05-11 NOTE — ED PROVIDER NOTE - INPATIENT RESIDENT/ACP NOTIFIED DATE
Blood work (today or later this week).     Mood:  - increase sertraline to 75 mg daily  - continue to work with therapist  - seek immediate attention if developing thoughts or plans to hurt yourself or anyone else    Continue to drink plenty of water and fo 25-Jan-2021 12:20

## 2021-07-09 ENCOUNTER — OUTPATIENT (OUTPATIENT)
Dept: OUTPATIENT SERVICES | Facility: HOSPITAL | Age: 86
LOS: 1 days | End: 2021-07-09
Payer: MEDICARE

## 2021-07-09 PROCEDURE — 91110 GI TRC IMG INTRAL ESOPH-ILE: CPT | Mod: 26

## 2021-07-09 PROCEDURE — 91110 GI TRC IMG INTRAL ESOPH-ILE: CPT

## 2021-07-16 ENCOUNTER — NON-APPOINTMENT (OUTPATIENT)
Age: 86
End: 2021-07-16

## 2021-07-19 DIAGNOSIS — D64.9 ANEMIA, UNSPECIFIED: ICD-10-CM

## 2021-07-20 ENCOUNTER — APPOINTMENT (OUTPATIENT)
Age: 86
End: 2021-07-20

## 2021-07-21 ENCOUNTER — RESULT REVIEW (OUTPATIENT)
Age: 86
End: 2021-07-21

## 2021-07-21 ENCOUNTER — APPOINTMENT (OUTPATIENT)
Dept: GASTROENTEROLOGY | Facility: HOSPITAL | Age: 86
End: 2021-07-21

## 2021-07-21 ENCOUNTER — OUTPATIENT (OUTPATIENT)
Dept: OUTPATIENT SERVICES | Facility: HOSPITAL | Age: 86
LOS: 1 days | Discharge: HOME | End: 2021-07-21
Payer: MEDICARE

## 2021-07-21 VITALS
OXYGEN SATURATION: 99 % | RESPIRATION RATE: 19 BRPM | DIASTOLIC BLOOD PRESSURE: 80 MMHG | HEART RATE: 68 BPM | SYSTOLIC BLOOD PRESSURE: 157 MMHG | WEIGHT: 199.96 LBS | HEIGHT: 69 IN | TEMPERATURE: 98 F

## 2021-07-21 LAB
APTT BLD: 33.3 SEC — SIGNIFICANT CHANGE UP (ref 27.5–35.5)
BASOPHILS # BLD AUTO: 0.02 K/UL — SIGNIFICANT CHANGE UP (ref 0–0.2)
BASOPHILS NFR BLD AUTO: 0.2 % — SIGNIFICANT CHANGE UP (ref 0–2)
EOSINOPHIL # BLD AUTO: 0.04 K/UL — SIGNIFICANT CHANGE UP (ref 0–0.5)
EOSINOPHIL NFR BLD AUTO: 0.4 % — SIGNIFICANT CHANGE UP (ref 0–6)
HCT VFR BLD CALC: 34 % — LOW (ref 39–50)
HGB BLD-MCNC: 10.4 G/DL — LOW (ref 13–17)
IMM GRANULOCYTES NFR BLD AUTO: 0.6 % — SIGNIFICANT CHANGE UP (ref 0–1.5)
INR BLD: 1.03 — SIGNIFICANT CHANGE UP (ref 0.88–1.16)
LACTATE SERPL-SCNC: 1.5 MMOL/L — SIGNIFICANT CHANGE UP (ref 0.5–2)
LYMPHOCYTES # BLD AUTO: 0.59 K/UL — LOW (ref 1–3.3)
LYMPHOCYTES # BLD AUTO: 5.4 % — LOW (ref 13–44)
MCHC RBC-ENTMCNC: 29.5 PG — SIGNIFICANT CHANGE UP (ref 27–34)
MCHC RBC-ENTMCNC: 30.6 GM/DL — LOW (ref 32–36)
MCV RBC AUTO: 96.3 FL — SIGNIFICANT CHANGE UP (ref 80–100)
MONOCYTES # BLD AUTO: 1.27 K/UL — HIGH (ref 0–0.9)
MONOCYTES NFR BLD AUTO: 11.7 % — SIGNIFICANT CHANGE UP (ref 2–14)
NEUTROPHILS # BLD AUTO: 8.89 K/UL — HIGH (ref 1.8–7.4)
NEUTROPHILS NFR BLD AUTO: 81.7 % — HIGH (ref 43–77)
NRBC # BLD: 0 /100 WBCS — SIGNIFICANT CHANGE UP (ref 0–0)
PLATELET # BLD AUTO: 189 K/UL — SIGNIFICANT CHANGE UP (ref 150–400)
PROTHROM AB SERPL-ACNC: 12.3 SEC — SIGNIFICANT CHANGE UP (ref 10.6–13.6)
RBC # BLD: 3.53 M/UL — LOW (ref 4.2–5.8)
RBC # FLD: 17.4 % — HIGH (ref 10.3–14.5)
WBC # BLD: 10.88 K/UL — HIGH (ref 3.8–10.5)
WBC # FLD AUTO: 10.88 K/UL — HIGH (ref 3.8–10.5)

## 2021-07-21 PROCEDURE — 88305 TISSUE EXAM BY PATHOLOGIST: CPT | Mod: 26

## 2021-07-21 PROCEDURE — 44377 SMALL BOWEL ENDOSCOPY/BIOPSY: CPT

## 2021-07-21 PROCEDURE — 99285 EMERGENCY DEPT VISIT HI MDM: CPT

## 2021-07-21 PROCEDURE — 43235 EGD DIAGNOSTIC BRUSH WASH: CPT

## 2021-07-21 RX ORDER — MEROPENEM 1 G/30ML
2000 INJECTION INTRAVENOUS ONCE
Refills: 0 | Status: COMPLETED | OUTPATIENT
Start: 2021-07-21 | End: 2021-07-21

## 2021-07-21 RX ORDER — ACETAMINOPHEN 500 MG
1000 TABLET ORAL ONCE
Refills: 0 | Status: COMPLETED | OUTPATIENT
Start: 2021-07-21 | End: 2021-07-22

## 2021-07-21 RX ORDER — VANCOMYCIN HCL 1 G
1250 VIAL (EA) INTRAVENOUS ONCE
Refills: 0 | Status: COMPLETED | OUTPATIENT
Start: 2021-07-21 | End: 2021-07-22

## 2021-07-21 NOTE — ED ADULT NURSE NOTE - OBJECTIVE STATEMENT
Patient w/ Hx of Afib, HTN, HLD, gout, c/o of SOB w/ fever and chills, states had endoscopy procedure this afternoon.

## 2021-07-21 NOTE — ED ADULT TRIAGE NOTE - CHIEF COMPLAINT QUOTE
Pt presents via EMS with c/o sudden onset, "fever and chills" at approx 1800 tonight. Reported tmax 100 per pt wife. Also c/o progressively worsening "SOB" x 2 weeks. Per EMS, pt tachypneic on scene with Spo2 90%, improved to 99% with O2 via NRBM @ 15L.

## 2021-07-22 ENCOUNTER — INPATIENT (INPATIENT)
Facility: HOSPITAL | Age: 86
LOS: 1 days | Discharge: HOME HEALTH | DRG: 205 | End: 2021-07-24
Attending: STUDENT IN AN ORGANIZED HEALTH CARE EDUCATION/TRAINING PROGRAM | Admitting: STUDENT IN AN ORGANIZED HEALTH CARE EDUCATION/TRAINING PROGRAM
Payer: MEDICARE

## 2021-07-22 DIAGNOSIS — K92.1 MELENA: ICD-10-CM

## 2021-07-22 DIAGNOSIS — J96.01 ACUTE RESPIRATORY FAILURE WITH HYPOXIA: ICD-10-CM

## 2021-07-22 DIAGNOSIS — K21.9 GASTRO-ESOPHAGEAL REFLUX DISEASE WITHOUT ESOPHAGITIS: ICD-10-CM

## 2021-07-22 DIAGNOSIS — I48.91 UNSPECIFIED ATRIAL FIBRILLATION: ICD-10-CM

## 2021-07-22 DIAGNOSIS — R65.10 SYSTEMIC INFLAMMATORY RESPONSE SYNDROME (SIRS) OF NON-INFECTIOUS ORIGIN WITHOUT ACUTE ORGAN DYSFUNCTION: ICD-10-CM

## 2021-07-22 DIAGNOSIS — E11.9 TYPE 2 DIABETES MELLITUS WITHOUT COMPLICATIONS: ICD-10-CM

## 2021-07-22 DIAGNOSIS — N18.9 CHRONIC KIDNEY DISEASE, UNSPECIFIED: ICD-10-CM

## 2021-07-22 DIAGNOSIS — Z90.49 ACQUIRED ABSENCE OF OTHER SPECIFIED PARTS OF DIGESTIVE TRACT: Chronic | ICD-10-CM

## 2021-07-22 DIAGNOSIS — I10 ESSENTIAL (PRIMARY) HYPERTENSION: ICD-10-CM

## 2021-07-22 DIAGNOSIS — Z29.9 ENCOUNTER FOR PROPHYLACTIC MEASURES, UNSPECIFIED: ICD-10-CM

## 2021-07-22 DIAGNOSIS — I50.9 HEART FAILURE, UNSPECIFIED: ICD-10-CM

## 2021-07-22 DIAGNOSIS — J18.9 PNEUMONIA, UNSPECIFIED ORGANISM: ICD-10-CM

## 2021-07-22 LAB
ALBUMIN SERPL ELPH-MCNC: 4 G/DL — SIGNIFICANT CHANGE UP (ref 3.3–5)
ALP SERPL-CCNC: 91 U/L — SIGNIFICANT CHANGE UP (ref 40–120)
ALT FLD-CCNC: 29 U/L — SIGNIFICANT CHANGE UP (ref 10–45)
ANION GAP SERPL CALC-SCNC: 10 MMOL/L — SIGNIFICANT CHANGE UP (ref 5–17)
ANION GAP SERPL CALC-SCNC: 12 MMOL/L — SIGNIFICANT CHANGE UP (ref 5–17)
APPEARANCE UR: CLEAR — SIGNIFICANT CHANGE UP
AST SERPL-CCNC: 30 U/L — SIGNIFICANT CHANGE UP (ref 10–40)
BASE EXCESS BLDV CALC-SCNC: 1 MMOL/L — SIGNIFICANT CHANGE UP (ref -2–3)
BASOPHILS # BLD AUTO: 0.02 K/UL — SIGNIFICANT CHANGE UP (ref 0–0.2)
BASOPHILS NFR BLD AUTO: 0.3 % — SIGNIFICANT CHANGE UP (ref 0–2)
BILIRUB SERPL-MCNC: 0.6 MG/DL — SIGNIFICANT CHANGE UP (ref 0.2–1.2)
BILIRUB UR-MCNC: NEGATIVE — SIGNIFICANT CHANGE UP
BUN SERPL-MCNC: 36 MG/DL — HIGH (ref 7–23)
BUN SERPL-MCNC: 37 MG/DL — HIGH (ref 7–23)
CA-I SERPL-SCNC: 1.12 MMOL/L — LOW (ref 1.15–1.33)
CALCIUM SERPL-MCNC: 8.8 MG/DL — SIGNIFICANT CHANGE UP (ref 8.4–10.5)
CALCIUM SERPL-MCNC: 9.4 MG/DL — SIGNIFICANT CHANGE UP (ref 8.4–10.5)
CHLORIDE SERPL-SCNC: 101 MMOL/L — SIGNIFICANT CHANGE UP (ref 96–108)
CHLORIDE SERPL-SCNC: 102 MMOL/L — SIGNIFICANT CHANGE UP (ref 96–108)
CO2 BLDV-SCNC: 28.6 MMOL/L — HIGH (ref 22–26)
CO2 SERPL-SCNC: 25 MMOL/L — SIGNIFICANT CHANGE UP (ref 22–31)
CO2 SERPL-SCNC: 26 MMOL/L — SIGNIFICANT CHANGE UP (ref 22–31)
COLOR SPEC: YELLOW — SIGNIFICANT CHANGE UP
CREAT SERPL-MCNC: 1.46 MG/DL — HIGH (ref 0.5–1.3)
CREAT SERPL-MCNC: 1.59 MG/DL — HIGH (ref 0.5–1.3)
DIFF PNL FLD: NEGATIVE — SIGNIFICANT CHANGE UP
EOSINOPHIL # BLD AUTO: 0.06 K/UL — SIGNIFICANT CHANGE UP (ref 0–0.5)
EOSINOPHIL NFR BLD AUTO: 0.8 % — SIGNIFICANT CHANGE UP (ref 0–6)
GAS PNL BLDV: 135 MMOL/L — LOW (ref 136–145)
GAS PNL BLDV: SIGNIFICANT CHANGE UP
GLUCOSE BLDC GLUCOMTR-MCNC: 209 MG/DL — HIGH (ref 70–99)
GLUCOSE BLDC GLUCOMTR-MCNC: 212 MG/DL — HIGH (ref 70–99)
GLUCOSE BLDC GLUCOMTR-MCNC: 229 MG/DL — HIGH (ref 70–99)
GLUCOSE BLDC GLUCOMTR-MCNC: 299 MG/DL — HIGH (ref 70–99)
GLUCOSE SERPL-MCNC: 199 MG/DL — HIGH (ref 70–99)
GLUCOSE SERPL-MCNC: 221 MG/DL — HIGH (ref 70–99)
GLUCOSE UR QL: NEGATIVE — SIGNIFICANT CHANGE UP
HCO3 BLDV-SCNC: 27 MMOL/L — SIGNIFICANT CHANGE UP (ref 22–29)
HCT VFR BLD CALC: 31.2 % — LOW (ref 39–50)
HGB BLD-MCNC: 9.4 G/DL — LOW (ref 13–17)
IMM GRANULOCYTES NFR BLD AUTO: 0.4 % — SIGNIFICANT CHANGE UP (ref 0–1.5)
KETONES UR-MCNC: NEGATIVE — SIGNIFICANT CHANGE UP
LEUKOCYTE ESTERASE UR-ACNC: NEGATIVE — SIGNIFICANT CHANGE UP
LIDOCAIN IGE QN: 65 U/L — HIGH (ref 7–60)
LYMPHOCYTES # BLD AUTO: 0.9 K/UL — LOW (ref 1–3.3)
LYMPHOCYTES # BLD AUTO: 11.7 % — LOW (ref 13–44)
MAGNESIUM SERPL-MCNC: 2 MG/DL — SIGNIFICANT CHANGE UP (ref 1.6–2.6)
MCHC RBC-ENTMCNC: 29.3 PG — SIGNIFICANT CHANGE UP (ref 27–34)
MCHC RBC-ENTMCNC: 30.1 GM/DL — LOW (ref 32–36)
MCV RBC AUTO: 97.2 FL — SIGNIFICANT CHANGE UP (ref 80–100)
MONOCYTES # BLD AUTO: 0.92 K/UL — HIGH (ref 0–0.9)
MONOCYTES NFR BLD AUTO: 11.9 % — SIGNIFICANT CHANGE UP (ref 2–14)
NEUTROPHILS # BLD AUTO: 5.78 K/UL — SIGNIFICANT CHANGE UP (ref 1.8–7.4)
NEUTROPHILS NFR BLD AUTO: 74.9 % — SIGNIFICANT CHANGE UP (ref 43–77)
NITRITE UR-MCNC: NEGATIVE — SIGNIFICANT CHANGE UP
NRBC # BLD: 0 /100 WBCS — SIGNIFICANT CHANGE UP (ref 0–0)
PCO2 BLDV: 48 MMHG — SIGNIFICANT CHANGE UP (ref 42–55)
PH BLDV: 7.36 — SIGNIFICANT CHANGE UP (ref 7.32–7.43)
PH UR: 6 — SIGNIFICANT CHANGE UP (ref 5–8)
PLATELET # BLD AUTO: 155 K/UL — SIGNIFICANT CHANGE UP (ref 150–400)
PO2 BLDV: 65 MMHG — SIGNIFICANT CHANGE UP
POTASSIUM BLDV-SCNC: 4.6 MMOL/L — SIGNIFICANT CHANGE UP (ref 3.5–5.1)
POTASSIUM SERPL-MCNC: 4.5 MMOL/L — SIGNIFICANT CHANGE UP (ref 3.5–5.3)
POTASSIUM SERPL-MCNC: 4.7 MMOL/L — SIGNIFICANT CHANGE UP (ref 3.5–5.3)
POTASSIUM SERPL-SCNC: 4.5 MMOL/L — SIGNIFICANT CHANGE UP (ref 3.5–5.3)
POTASSIUM SERPL-SCNC: 4.7 MMOL/L — SIGNIFICANT CHANGE UP (ref 3.5–5.3)
PROT SERPL-MCNC: 7.3 G/DL — SIGNIFICANT CHANGE UP (ref 6–8.3)
PROT UR-MCNC: NEGATIVE MG/DL — SIGNIFICANT CHANGE UP
RBC # BLD: 3.21 M/UL — LOW (ref 4.2–5.8)
RBC # FLD: 17.4 % — HIGH (ref 10.3–14.5)
SAO2 % BLDV: 92 % — SIGNIFICANT CHANGE UP
SARS-COV-2 RNA SPEC QL NAA+PROBE: NEGATIVE — SIGNIFICANT CHANGE UP
SODIUM SERPL-SCNC: 137 MMOL/L — SIGNIFICANT CHANGE UP (ref 135–145)
SODIUM SERPL-SCNC: 139 MMOL/L — SIGNIFICANT CHANGE UP (ref 135–145)
SP GR SPEC: 1.02 — SIGNIFICANT CHANGE UP (ref 1–1.03)
UROBILINOGEN FLD QL: 0.2 E.U./DL — SIGNIFICANT CHANGE UP
WBC # BLD: 7.71 K/UL — SIGNIFICANT CHANGE UP (ref 3.8–10.5)
WBC # FLD AUTO: 7.71 K/UL — SIGNIFICANT CHANGE UP (ref 3.8–10.5)

## 2021-07-22 PROCEDURE — 99223 1ST HOSP IP/OBS HIGH 75: CPT | Mod: GC,AI

## 2021-07-22 PROCEDURE — 99222 1ST HOSP IP/OBS MODERATE 55: CPT

## 2021-07-22 PROCEDURE — 93306 TTE W/DOPPLER COMPLETE: CPT | Mod: 26

## 2021-07-22 PROCEDURE — 71045 X-RAY EXAM CHEST 1 VIEW: CPT | Mod: 26

## 2021-07-22 RX ORDER — GLUCAGON INJECTION, SOLUTION 0.5 MG/.1ML
1 INJECTION, SOLUTION SUBCUTANEOUS ONCE
Refills: 0 | Status: DISCONTINUED | OUTPATIENT
Start: 2021-07-22 | End: 2021-07-24

## 2021-07-22 RX ORDER — PANTOPRAZOLE SODIUM 20 MG/1
40 TABLET, DELAYED RELEASE ORAL
Refills: 0 | Status: DISCONTINUED | OUTPATIENT
Start: 2021-07-22 | End: 2021-07-24

## 2021-07-22 RX ORDER — ATORVASTATIN CALCIUM 80 MG/1
40 TABLET, FILM COATED ORAL AT BEDTIME
Refills: 0 | Status: DISCONTINUED | OUTPATIENT
Start: 2021-07-22 | End: 2021-07-24

## 2021-07-22 RX ORDER — METRONIDAZOLE 500 MG
500 TABLET ORAL EVERY 8 HOURS
Refills: 0 | Status: DISCONTINUED | OUTPATIENT
Start: 2021-07-22 | End: 2021-07-24

## 2021-07-22 RX ORDER — AMIODARONE HYDROCHLORIDE 400 MG/1
0 TABLET ORAL
Qty: 0 | Refills: 0 | DISCHARGE

## 2021-07-22 RX ORDER — FUROSEMIDE 40 MG
20 TABLET ORAL DAILY
Refills: 0 | Status: DISCONTINUED | OUTPATIENT
Start: 2021-07-22 | End: 2021-07-24

## 2021-07-22 RX ORDER — DEXTROSE 50 % IN WATER 50 %
15 SYRINGE (ML) INTRAVENOUS ONCE
Refills: 0 | Status: DISCONTINUED | OUTPATIENT
Start: 2021-07-22 | End: 2021-07-24

## 2021-07-22 RX ORDER — LISINOPRIL 2.5 MG/1
10 TABLET ORAL DAILY
Refills: 0 | Status: DISCONTINUED | OUTPATIENT
Start: 2021-07-22 | End: 2021-07-23

## 2021-07-22 RX ORDER — FUROSEMIDE 40 MG
0 TABLET ORAL
Qty: 0 | Refills: 0 | DISCHARGE

## 2021-07-22 RX ORDER — ENOXAPARIN SODIUM 100 MG/ML
40 INJECTION SUBCUTANEOUS DAILY
Refills: 0 | Status: DISCONTINUED | OUTPATIENT
Start: 2021-07-22 | End: 2021-07-24

## 2021-07-22 RX ORDER — ALLOPURINOL 300 MG
50 TABLET ORAL DAILY
Refills: 0 | Status: DISCONTINUED | OUTPATIENT
Start: 2021-07-22 | End: 2021-07-24

## 2021-07-22 RX ORDER — ALLOPURINOL 300 MG
0 TABLET ORAL
Qty: 0 | Refills: 0 | DISCHARGE

## 2021-07-22 RX ORDER — SODIUM CHLORIDE 9 MG/ML
1000 INJECTION, SOLUTION INTRAVENOUS
Refills: 0 | Status: DISCONTINUED | OUTPATIENT
Start: 2021-07-22 | End: 2021-07-24

## 2021-07-22 RX ORDER — ATORVASTATIN CALCIUM 80 MG/1
0 TABLET, FILM COATED ORAL
Qty: 0 | Refills: 0 | DISCHARGE

## 2021-07-22 RX ORDER — QUINAPRIL HYDROCHLORIDE 40 MG/1
0 TABLET, FILM COATED ORAL
Qty: 0 | Refills: 0 | DISCHARGE

## 2021-07-22 RX ORDER — APIXABAN 2.5 MG/1
0.5 TABLET, FILM COATED ORAL
Qty: 0 | Refills: 0 | DISCHARGE

## 2021-07-22 RX ORDER — AMIODARONE HYDROCHLORIDE 400 MG/1
200 TABLET ORAL DAILY
Refills: 0 | Status: DISCONTINUED | OUTPATIENT
Start: 2021-07-22 | End: 2021-07-24

## 2021-07-22 RX ORDER — METOPROLOL TARTRATE 50 MG
25 TABLET ORAL DAILY
Refills: 0 | Status: DISCONTINUED | OUTPATIENT
Start: 2021-07-22 | End: 2021-07-24

## 2021-07-22 RX ORDER — INSULIN LISPRO 100/ML
VIAL (ML) SUBCUTANEOUS
Refills: 0 | Status: DISCONTINUED | OUTPATIENT
Start: 2021-07-22 | End: 2021-07-24

## 2021-07-22 RX ORDER — DEXTROSE 50 % IN WATER 50 %
12.5 SYRINGE (ML) INTRAVENOUS ONCE
Refills: 0 | Status: DISCONTINUED | OUTPATIENT
Start: 2021-07-22 | End: 2021-07-24

## 2021-07-22 RX ORDER — CEFTRIAXONE 500 MG/1
1000 INJECTION, POWDER, FOR SOLUTION INTRAMUSCULAR; INTRAVENOUS EVERY 24 HOURS
Refills: 0 | Status: DISCONTINUED | OUTPATIENT
Start: 2021-07-22 | End: 2021-07-24

## 2021-07-22 RX ORDER — DEXTROSE 50 % IN WATER 50 %
25 SYRINGE (ML) INTRAVENOUS ONCE
Refills: 0 | Status: DISCONTINUED | OUTPATIENT
Start: 2021-07-22 | End: 2021-07-24

## 2021-07-22 RX ORDER — PANTOPRAZOLE SODIUM 20 MG/1
0 TABLET, DELAYED RELEASE ORAL
Qty: 0 | Refills: 3 | DISCHARGE

## 2021-07-22 RX ADMIN — LISINOPRIL 10 MILLIGRAM(S): 2.5 TABLET ORAL at 18:26

## 2021-07-22 RX ADMIN — MEROPENEM 2000 MILLIGRAM(S): 1 INJECTION INTRAVENOUS at 01:20

## 2021-07-22 RX ADMIN — Medication 50 MILLIGRAM(S): at 18:27

## 2021-07-22 RX ADMIN — Medication 2: at 18:24

## 2021-07-22 RX ADMIN — MEROPENEM 200 MILLIGRAM(S): 1 INJECTION INTRAVENOUS at 00:50

## 2021-07-22 RX ADMIN — Medication 3: at 12:15

## 2021-07-22 RX ADMIN — Medication 25 MILLIGRAM(S): at 18:27

## 2021-07-22 RX ADMIN — Medication 2: at 22:31

## 2021-07-22 RX ADMIN — Medication 500 MILLIGRAM(S): at 18:27

## 2021-07-22 RX ADMIN — PANTOPRAZOLE SODIUM 40 MILLIGRAM(S): 20 TABLET, DELAYED RELEASE ORAL at 07:19

## 2021-07-22 RX ADMIN — Medication 1000 MILLIGRAM(S): at 00:45

## 2021-07-22 RX ADMIN — CEFTRIAXONE 100 MILLIGRAM(S): 500 INJECTION, POWDER, FOR SOLUTION INTRAMUSCULAR; INTRAVENOUS at 09:52

## 2021-07-22 RX ADMIN — ENOXAPARIN SODIUM 40 MILLIGRAM(S): 100 INJECTION SUBCUTANEOUS at 09:52

## 2021-07-22 RX ADMIN — ATORVASTATIN CALCIUM 40 MILLIGRAM(S): 80 TABLET, FILM COATED ORAL at 22:33

## 2021-07-22 RX ADMIN — Medication 20 MILLIGRAM(S): at 18:26

## 2021-07-22 RX ADMIN — Medication 400 MILLIGRAM(S): at 00:28

## 2021-07-22 RX ADMIN — Medication 1000 MILLIGRAM(S): at 01:00

## 2021-07-22 RX ADMIN — Medication 1250 MILLIGRAM(S): at 03:30

## 2021-07-22 RX ADMIN — Medication 166.67 MILLIGRAM(S): at 01:57

## 2021-07-22 RX ADMIN — Medication 2: at 08:58

## 2021-07-22 RX ADMIN — Medication 500 MILLIGRAM(S): at 09:52

## 2021-07-22 RX ADMIN — AMIODARONE HYDROCHLORIDE 200 MILLIGRAM(S): 400 TABLET ORAL at 18:27

## 2021-07-22 NOTE — PROGRESS NOTE ADULT - SUBJECTIVE AND OBJECTIVE BOX
OVERNIGHT EVENTS: NAEO    SUBJECTIVE / INTERVAL HPI: Patient seen and examined at bedside. Patient denying chest pain, SOB, palpitations, cough. Patient denies fever, chills, HA, Dizziness, change in vision/hearing, N/V, abdominal pain, diarrhea, constipation, hematochezia/melena, dysuria, hematuria, new onset weakness/numbness, LE pain and/or swelling.    Remaining ROS negative     PHYSICAL EXAM:    General: WDWN  HEENT: NC/AT; PERRL, anicteric sclera; MMM  Neck: supple  Cardiovascular: +S1/S2, RRR  Respiratory: CTA B/L; no W/R/R  Gastrointestinal: soft, NT/ND; +BSx4  Extremities: WWP; no edema, clubbing or cyanosis  Vascular: 2+ radial, DP/PT pulses B/L  Neurological: AAOx3; no focal deficits  Psychiatric: pleasant mood and affect  Dermatologic: no appreciable wounds or damage to the skin    VITAL SIGNS:  Vital Signs Last 24 Hrs  T(C): 36.5 (2021 08:49), Max: 38.6 (2021 23:03)  T(F): 97.7 (2021 08:49), Max: 101.5 (2021 23:03)  HR: 64 (2021 09:53) (52 - 68)  BP: 127/62 (2021 08:49) (107/56 - 157/80)  RR: 18 (2021 08:49) (16 - 19)  SpO2: 98% (2021 09:53) (94% - 99%)    MEDICATIONS:  MEDICATIONS  (STANDING):  cefTRIAXone   IVPB 1000 milliGRAM(s) IV Intermittent every 24 hours  dextrose 40% Gel 15 Gram(s) Oral once  dextrose 5%. 1000 milliLiter(s) (50 mL/Hr) IV Continuous <Continuous>  dextrose 5%. 1000 milliLiter(s) (100 mL/Hr) IV Continuous <Continuous>  dextrose 50% Injectable 25 Gram(s) IV Push once  dextrose 50% Injectable 12.5 Gram(s) IV Push once  dextrose 50% Injectable 25 Gram(s) IV Push once  enoxaparin Injectable 40 milliGRAM(s) SubCutaneous daily  glucagon  Injectable 1 milliGRAM(s) IntraMuscular once  insulin lispro (ADMELOG) corrective regimen sliding scale   SubCutaneous Before meals and at bedtime  metroNIDAZOLE    Tablet 500 milliGRAM(s) Oral every 8 hours  pantoprazole    Tablet 40 milliGRAM(s) Oral before breakfast    ALLERGIES:  penicillin (Other (Mild))  sulfa drugs (Unknown)    LABS:                        9.4    7.71  )-----------( 155      ( 2021 08:27 )             31.2     07    137  |  102  |  37<H>  ----------------------------<  199<H>  4.5   |  25  |  1.46<H>    Ca    8.8      2021 08:27  Mg     2.0         TPro  7.3  /  Alb  4.0  /  TBili  0.6  /  DBili  x   /  AST  30  /  ALT  29  /  AlkPhos  91  07-21    PT/INR - ( 2021 23:36 )   PT: 12.3 sec;   INR: 1.03          PTT - ( 2021 23:36 )  PTT:33.3 sec  Urinalysis Basic - ( 2021 00:26 )    Color: Yellow / Appearance: Clear / S.025 / pH: x  Gluc: x / Ketone: NEGATIVE  / Bili: Negative / Urobili: 0.2 E.U./dL   Blood: x / Protein: NEGATIVE mg/dL / Nitrite: NEGATIVE   Leuk Esterase: NEGATIVE / RBC: x / WBC x   Sq Epi: x / Non Sq Epi: x / Bacteria: x    CAPILLARY BLOOD GLUCOSE  POCT Blood Glucose.: 209 mg/dL (2021 08:53)    RADIOLOGY & ADDITIONAL TESTS: Reviewed. OVERNIGHT EVENTS: NAEO    SUBJECTIVE / INTERVAL HPI: Patient seen and examined at bedside. Patient denying chest pain, SOB, palpitations, cough. Patient denies fever, chills, HA, Dizziness, change in vision/hearing, N/V, abdominal pain, diarrhea, constipation, hematochezia/melena, dysuria, hematuria, new onset weakness/numbness, LE pain and/or swelling.    Remaining ROS negative     PHYSICAL EXAM:  Constitutional: resting comfortably in bed; NAD  HEENT: NC/AT, EOMI, anicteric sclera, no nasal discharge; uvula midline, no oropharyngeal erythema or exudates, MMM;   Respiratory: scattered crackles in posterior mid-lower lung fields  Cardiac: +S1/S2; RRR; (+) 2-3/6 holosystolic murmur best heard at LUSB, non radiating to carotids, no JVD noted  Gastrointestinal: abdomen soft, NT/ND, no guarding, globulose, dull to percussion  Extremities: legs/arms WWP, (+)1+ peripheral edema to shins  Dermatologic: skin warm, dry and intact; no rashes, wounds, or scars noted  Neurologic: AAOx3; CNII-XII grossly intact; strength 5/5 in arms, sensation intact in all 4 extremities; no focal deficits noted  Psychiatric: affect and characteristics of appearance, verbalizations, behaviors are appropriate    VITAL SIGNS:  Vital Signs Last 24 Hrs  T(C): 36.5 (2021 08:49), Max: 38.6 (2021 23:03)  T(F): 97.7 (2021 08:49), Max: 101.5 (2021 23:03)  HR: 64 (2021 09:53) (52 - 68)  BP: 127/62 (2021 08:49) (107/56 - 157/80)  RR: 18 (2021 08:49) (16 - 19)  SpO2: 98% (2021 09:53) (94% - 99%)    MEDICATIONS:  MEDICATIONS  (STANDING):  cefTRIAXone   IVPB 1000 milliGRAM(s) IV Intermittent every 24 hours  dextrose 40% Gel 15 Gram(s) Oral once  dextrose 5%. 1000 milliLiter(s) (50 mL/Hr) IV Continuous <Continuous>  dextrose 5%. 1000 milliLiter(s) (100 mL/Hr) IV Continuous <Continuous>  dextrose 50% Injectable 25 Gram(s) IV Push once  dextrose 50% Injectable 12.5 Gram(s) IV Push once  dextrose 50% Injectable 25 Gram(s) IV Push once  enoxaparin Injectable 40 milliGRAM(s) SubCutaneous daily  glucagon  Injectable 1 milliGRAM(s) IntraMuscular once  insulin lispro (ADMELOG) corrective regimen sliding scale   SubCutaneous Before meals and at bedtime  metroNIDAZOLE    Tablet 500 milliGRAM(s) Oral every 8 hours  pantoprazole    Tablet 40 milliGRAM(s) Oral before breakfast    ALLERGIES:  penicillin (Other (Mild))  sulfa drugs (Unknown)    LABS:                        9.4    7.71  )-----------( 155      ( 2021 08:27 )             31.2     07-    137  |  102  |  37<H>  ----------------------------<  199<H>  4.5   |  25  |  1.46<H>    Ca    8.8      2021 08:27  Mg     2.0     07-    TPro  7.3  /  Alb  4.0  /  TBili  0.6  /  DBili  x   /  AST  30  /  ALT  29  /  AlkPhos  91  07-21    PT/INR - ( 2021 23:36 )   PT: 12.3 sec;   INR: 1.03       PTT - ( 2021 23:36 )  PTT:33.3 sec  Urinalysis Basic - ( 2021 00:26 )    Color: Yellow / Appearance: Clear / S.025 / pH: x  Gluc: x / Ketone: NEGATIVE  / Bili: Negative / Urobili: 0.2 E.U./dL   Blood: x / Protein: NEGATIVE mg/dL / Nitrite: NEGATIVE   Leuk Esterase: NEGATIVE / RBC: x / WBC x   Sq Epi: x / Non Sq Epi: x / Bacteria: x    CAPILLARY BLOOD GLUCOSE  POCT Blood Glucose.: 209 mg/dL (2021 08:53)    RADIOLOGY & ADDITIONAL TESTS: Reviewed. OVERNIGHT EVENTS: NAEO    SUBJECTIVE / INTERVAL HPI: Patient seen and examined at bedside. No important SOB, he manifest he is tired but better than the day before. Patient denying chest pain, palpitations, cough. Patient denies fever, chills, HA, Dizziness, change in vision/hearing, N/V, abdominal pain, diarrhea, constipation, hematochezia/melena, dysuria, hematuria, new onset weakness/numbness, LE pain and/or swelling.    Remaining ROS negative     PHYSICAL EXAM:  Constitutional: resting comfortably in bed; NAD  HEENT: NC/AT, EOMI, anicteric sclera, no nasal discharge; uvula midline, no oropharyngeal erythema or exudates, MMM;   Respiratory: scattered crackles in posterior mid-lower lung fields  Cardiac: +S1/S2; RRR; (+) 2-3/6 holosystolic murmur best heard at LUSB, non radiating to carotids, no JVD noted  Gastrointestinal: abdomen soft, NT/ND, no guarding, globulose, dull to percussion  Extremities: legs/arms WWP, (+)1+ peripheral edema to shins  Dermatologic: skin warm, dry and intact; no rashes, wounds, or scars noted  Neurologic: AAOx3; CNII-XII grossly intact; strength 5/5 in arms, sensation intact in all 4 extremities; no focal deficits noted  Psychiatric: affect and characteristics of appearance, verbalizations, behaviors are appropriate    VITAL SIGNS:  Vital Signs Last 24 Hrs  T(C): 36.5 (2021 08:49), Max: 38.6 (2021 23:03)  T(F): 97.7 (2021 08:49), Max: 101.5 (2021 23:03)  HR: 64 (2021 09:53) (52 - 68)  BP: 127/62 (2021 08:49) (107/56 - 157/80)  RR: 18 (2021 08:49) (16 - 19)  SpO2: 98% (2021 09:53) (94% - 99%)    MEDICATIONS:  MEDICATIONS  (STANDING):  cefTRIAXone   IVPB 1000 milliGRAM(s) IV Intermittent every 24 hours  dextrose 40% Gel 15 Gram(s) Oral once  dextrose 5%. 1000 milliLiter(s) (50 mL/Hr) IV Continuous <Continuous>  dextrose 5%. 1000 milliLiter(s) (100 mL/Hr) IV Continuous <Continuous>  dextrose 50% Injectable 25 Gram(s) IV Push once  dextrose 50% Injectable 12.5 Gram(s) IV Push once  dextrose 50% Injectable 25 Gram(s) IV Push once  enoxaparin Injectable 40 milliGRAM(s) SubCutaneous daily  glucagon  Injectable 1 milliGRAM(s) IntraMuscular once  insulin lispro (ADMELOG) corrective regimen sliding scale   SubCutaneous Before meals and at bedtime  metroNIDAZOLE    Tablet 500 milliGRAM(s) Oral every 8 hours  pantoprazole    Tablet 40 milliGRAM(s) Oral before breakfast    ALLERGIES:  penicillin (Other (Mild))  sulfa drugs (Unknown)    LABS:                        9.4    7.71  )-----------( 155      ( 2021 08:27 )             31.2     07-    137  |  102  |  37<H>  ----------------------------<  199<H>  4.5   |  25  |  1.46<H>    Ca    8.8      2021 08:27  Mg     2.0     07-    TPro  7.3  /  Alb  4.0  /  TBili  0.6  /  DBili  x   /  AST  30  /  ALT  29  /  AlkPhos  91  07-21    PT/INR - ( 2021 23:36 )   PT: 12.3 sec;   INR: 1.03       PTT - ( 2021 23:36 )  PTT:33.3 sec  Urinalysis Basic - ( 2021 00:26 )    Color: Yellow / Appearance: Clear / S.025 / pH: x  Gluc: x / Ketone: NEGATIVE  / Bili: Negative / Urobili: 0.2 E.U./dL   Blood: x / Protein: NEGATIVE mg/dL / Nitrite: NEGATIVE   Leuk Esterase: NEGATIVE / RBC: x / WBC x   Sq Epi: x / Non Sq Epi: x / Bacteria: x    CAPILLARY BLOOD GLUCOSE  POCT Blood Glucose.: 209 mg/dL (2021 08:53)    RADIOLOGY & ADDITIONAL TESTS: Reviewed.

## 2021-07-22 NOTE — PROGRESS NOTE ADULT - PROBLEM SELECTOR PLAN 3
Presentation:  - as above    Suspect aspiration PNA 2/2 endoscopy 7/21 vs. CAP i/s/o resp. sx possibly for last few days vs. chemical pneumonitis 2/2 endoscopy  No underlying lung disease, hospitalization, or recent Abx use    Plan:  - c/w ceftriaxone 1g IV q24 (to complete 5-7 day course, 7/21-) and flagyl 500 q8 PO (to complete 5-7 day course 7/21-) for c/f aspiration PNA  - c/w vancomycin 1g q24 for MRSA coverage given c/f asp. PNA acquired in hospital -> 2nd dose due 7/23 2am  - add on procalcitonin Suspect aspiration PNA 2/2 endoscopy 7/21 vs. CAP i/s/o resp. sx possibly for last few days vs. chemical pneumonitis 2/2 endoscopy  No underlying lung disease, hospitalization, or recent Abx use    Plan:  - c/w ceftriaxone 1g IV q24 (to complete 5-7 day course, 7/21-) and flagyl 500 q8 PO (to complete 5-7 day course 7/21-) for c/f aspiration PNA  - c/w vancomycin 1g q24 for MRSA coverage given c/f asp. PNA acquired in hospital -> 2nd dose due 7/23 2am  - add on procalcitonin

## 2021-07-22 NOTE — ED PROVIDER NOTE - CLINICAL SUMMARY MEDICAL DECISION MAKING FREE TEXT BOX
86 y/o M pt presents to ED with likely aspiration pneumonia and hypoxia. Plan for labs, IV Vancomycin and Meropenum, and will admit. Fluids held secondary to pt with CHF. 86 y/o M pt presents to ED with likely aspiration pneumonia and hypoxia. Plan for labs, IV Vancomycin and Meropenum, and will admit. Fluids held secondary to pt with CHF.  ICU consult placed- pt is 92-93% on 4L NC.- In ED to evaluate patient.   Case discussed with GI fellow. Will follow pt as inpatient.   Pt stable for the floor- will admit.

## 2021-07-22 NOTE — H&P ADULT - PROBLEM SELECTOR PLAN 6
Presentation:  - unknown baseline EF    Plan:  - med rec in AM Presentation:  - unknown baseline EF  - takes metoprolol and lasix, unsure of dosages    Plan:  - med rec in AM and restart HF meds w/ hold parameters Presentation:  - Glucose 221 on admission  - last a1c 8.0 (1/26/21)    Plan:  - ISS  - f/u a1c    #HLD  - restart home atorvastatin

## 2021-07-22 NOTE — H&P ADULT - NSICDXPASTSURGICALHX_GEN_ALL_CORE_FT
PAST SURGICAL HISTORY:  History of appendectomy ~1961    History of partial colectomy ~1996, performed for diverticulitis

## 2021-07-22 NOTE — CONSULT NOTE ADULT - ATTENDING COMMENTS
Review Chest X ray   Patient seen with fellow at bedside  Likely a combination of fluid overload and possible aspiration  Agree with above plan
88 yo M with PMH a-fib, gout, HTN, HLD, CHF, CKD3 (BL Cr 1.5-1.7), GERD, T2D, and s/p moderna x2 who p/w SOB, SAENZ, F/C, weakness x1d in setting of recent EGD for intermittent melenotic episodes. ICU consulted for hypoxia.    CAP  anemia/GIB/AVM  Afib  CKD    -c/w O2 supplementation, CAP coverage, cultures/RVP/PCT  -stable hemodynamics  -c/w rate control for afib   -stable hct, monitor for clinical evidence of GIB     dispo: F

## 2021-07-22 NOTE — H&P ADULT - PROBLEM SELECTOR PLAN 2
Presentation:  - 1/4 SIRS criteria met w/ fever on admission; no leukocytosis >12, tachycardia >90, or tachypnea >20   - Medications: tylenol 1g IV x1, meropenem 2mg IV, vancomycin 1250mg IVx1\  - CXR: infiltrate in L mid lung;  - UA unremarkable    Plan:  - treat underlying PNA as below Presentation:  - 1/4 SIRS criteria met w/ fever on admission; no leukocytosis >12, tachycardia >90, or tachypnea >20   - chills, shakes, subjective fevers at home  - WBC 10.88, Lactate unremarkable  - Medications: tylenol 1g IV x1, meropenem 2mg IV, vancomycin 1250mg IVx1  - CXR: infiltrate in L mid lung;  - UA unremarkable    Plan:  - treat underlying suspected PNA as below  - f/u blood cultures

## 2021-07-22 NOTE — ED PROVIDER NOTE - OBJECTIVE STATEMENT
86 y/o M pt with PMHx of CHF, HTN, Afib, and HLD presents to ED c/o fever this evening. Pt had an endoscopy performed earlier today, around 11AM, and felt fine afterwards. However, this evening he had onset of generalized weakness and fever of 101 at home. Pt denies nausea and vomiting or any other acute complaints., but was found to be hypoxic in ED.

## 2021-07-22 NOTE — H&P ADULT - PROBLEM SELECTOR PLAN 4
Presentation:  - EKG: AFib, L axis dev, first degree AV block, RBBB, ST elev. vs. prolonged QRS in aVR; TWI aVL    Plan:  - off Eliquis for ~10 days in prep for endoscopy 7/21  - consult GI in AM to discuss any further procedures to stabilize AVM bleeding before pt can safely resume eliquis Presentation:  - EKG: AFib, L axis dev, first degree AV block, RBBB, ST elev. vs. prolonged QRS in aVR; TWI aVL  - takes amiodarone, metoprolol; normally takes eliquis but recently off in preparation for endoscopy, and has been off it in past for GIB    Plan:  - off Eliquis for ~10 days in prep for endoscopy 7/21  - consult GI in AM to discuss any further procedures to stabilize AVM bleeding before pt can safely resume eliquis  - med rec in AM, restart amiodarone and metoprolol Presentation:  - hx dark stools x ~6 months, follows w/ Dr. 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 of GIB    #Anemia  Normocytic anemia to Hgb 10.4, MCV 96.3 on admission. Baseline Hgb ~8-10. Suspect 2/2 KAIN vs. ACD i/s/o CKD.  - f/u iron panel, b12, folate outpatient (or inpatient if stays>1 day)

## 2021-07-22 NOTE — H&P ADULT - PROBLEM SELECTOR PLAN 5
Presentation:  - Glucose 221 on admission  - last a1c 8.0 (1/26/21)    Plan:  - ISS  - f/u a1c Presentation:  - EKG: AFib, L axis dev, first degree AV block, RBBB, ST elev. vs. prolonged QRS in aVR; TWI aVL  - takes amiodarone, metoprolol; normally takes eliquis but recently off in preparation for endoscopy, and has been off it in past for GIB    Plan:  - off Eliquis for ~10 days in prep for endoscopy 7/21  - consult GI in AM to discuss any further procedures to stabilize AVM bleeding before pt can safely resume eliquis  - med rec in AM, restart amiodarone and metoprolol Presentation:  - EKG: AFib, L axis dev, first degree AV block, RBBB, ST elev. vs. prolonged QRS in aVR; TWI aVL  - takes amiodarone, metoprolol; normally takes eliquis but recently off in preparation for endoscopy, and has been off it in past for GIB    Plan:  - off Eliquis for ~10 days in prep for endoscopy 7/21  - consult GI in AM to discuss any further procedures to stabilize AVM bleeding before pt can safely resume eliquis  - med rec in AM, restart amiodarone and metoprolol    #HTN  Presentation:  - BP: 157/80 on admission  - takes metoprolol, unsure of dosages    Plan:  - med rec in AM and restart HF meds w/ hold parameters

## 2021-07-22 NOTE — PROGRESS NOTE ADULT - PROBLEM SELECTOR PLAN 2
Presentation:  - 1/4 SIRS criteria met w/ fever on admission; no leukocytosis >12, tachycardia >90, or tachypnea >20   - chills, shakes, subjective fevers at home  - WBC 10.88, Lactate unremarkable  - Medications: tylenol 1g IV x1, meropenem 2mg IV, vancomycin 1250mg IVx1  - CXR: infiltrate in L mid lung;  - UA unremarkable    Plan:  - treat underlying suspected PNA as below  - f/u blood cultures Patient met 1/4 of SIRS criteria. He was w/ fever on admission; no leukocytosis >12, tachycardia >90, or tachypnea >20. He had chills, shakes, subjective fevers at home. His WBC was 10.88, his lactate unremarkable. In ED the next medications were started: Tylenol 1g IV x1, meropenem 2mg IV, vancomycin 1250mg IVx1. In the CXR, pt had an infiltrate in L mid lung and his UA was unremarkable. No initial blood cultures were available.    Plan:  - treat underlying suspected PNA with ceftriaxone 1g q24h IV and metronidazole 500mg q8h PO during 5 days and Tylenol if fever.

## 2021-07-22 NOTE — H&P ADULT - ATTENDING COMMENTS
Pt. seen and examined earlier today; I have read Dr. Shelley's H&P, I agree w/ his assessment and plan of care as documented

## 2021-07-22 NOTE — H&P ADULT - HISTORY OF PRESENT ILLNESS
PCP:   Pharmacy:   - - - - -    HPI:     In the ED:  Initial vital signs: T: XX F, HR: XX, BP: XX, RR: XX, SpO2: XX% on RA  ED course:   Labs: significant for  Imaging:  CXR:   EKG:   Medications:   Consults: none       #Problem 1    #Problem 2    #Prophylactic Measure  F: s/p 1L NS   E: prn   N: normal diet   DVT ppx: lovenox   GI ppx: PPI   GI motility: senna   dispo: [facility]    PCP: Dr. Rosales Moore  Pharmacy: [facility] [address]  - - - - -    HPI:     Mr. Son is an 87 yr old man with a PMH of HTN, HLD, CHF, Afib (s/p cardioversion), T2D, CKD3 (BL Cr 1.5-1.7), gout, GERD, GI AVMs (hx bleeds) who p/w SOB, SAENZ, F/C. leg weakness x1d.    Pt follows with GI Dr. Rangel for dark stools x6 months. Capsule endoscopy study ~10 days prior to admission showed new AVMs. Then came off eliquis 10 days ago in preparation for EGD 7/21. EGD showed active bleeding, but GI unable to reach active bleed site. Post-procedure he was feeling well, but then symptoms started ~6pm in evening. They included "difficulty" with deep breaths, as well as chills, shakes, subjective fevers, and generalized leg weakness. Also had SOB and dry cough, which he endorsed to prior provider was ongoing for last few days. He presented to ED for further evaluation.    Of note, on prior interview this admission, pt endorsed feeling weak x 6mo and SOB since cardioversion in 2/2021 with Dr. Rubén [facility] at [facility]. No sweats, appetite changes, chest pain, palpitations, or GI symptoms. Has had GI workup in past with polyps, and family history of colon cancer.    In the ED:  Initial vital signs: T: 97.8 F, HR: 68, BP: 157/80, RR: 19, SpO2: 99% on NRB 15 L/min  ED course:   Labs: significant for WBC 10.88, Hgb 10.4, Plt 189, BUN 36, Cr 1.59, GFR 38, Glucose 221, UA unremarkable  Imaging:  CXR: infiltrate in L mid lung;   EKG: AFib, L axis dev, first degree AV block, RBBB, ST elev. vs. prolonged QRS in aVR; TWI aVL  Medications: tylenol 1g IV x1, meropenem 2mg IV, vancomycin 1250mg IVx1  Consults: ICU. Pt initially tachypneic w/ O2 90%, placed on NRB at 15L/mn, improved to 99%, then weaned down to 3L NC. PCP: Dr. Rosales Moore  [facility]: [facility] [address]  - - - - -    HPI:     Mr. Son is an 87 yr old man with a PMH of HTN, HLD, CHF, Afib (s/p cardioversion), T2D, CKD3 (BL Cr 1.5-1.8), gout, GERD, GI AVMs (hx bleeds) who p/w SOB, SAENZ, F/C. leg weakness x1d.    Pt follows with GI Dr. Rangel for dark stools x6 months. Capsule endoscopy study ~10 days prior to admission showed new AVMs. Then came off eliquis 10 days ago in preparation for EGD 7/21. EGD showed active bleeding, but GI unable to reach active bleed site. Post-procedure he was feeling well, but then symptoms started ~6pm in evening. They included "difficulty" with deep breaths, as well as chills, shakes, subjective fevers, and generalized leg weakness. Also had SOB and dry cough, which he endorsed to prior provider was ongoing for last few days. He presented to ED for further evaluation.    Of note, on prior interview this admission, pt endorsed feeling weak x 6mo and SOB since cardioversion in 2/2021 with Dr. Rubén Dan at [facility]. No sweats, appetite changes, chest pain, palpitations, or GI symptoms. Has had GI workup in past with polyps, and family history of colon cancer.    In the ED:  Initial vital signs: T: 97.8 F, HR: 68, BP: 157/80, RR: 19, SpO2: 99% on NRB 15 L/min  ED course:   Labs: significant for WBC 10.88, Hgb 10.4, Plt 189, BUN 36, Cr 1.59, GFR 38, Glucose 221, UA unremarkable  Imaging:  CXR: infiltrate in L mid lung;   EKG: AFib, L axis dev, first degree AV block, RBBB, ST elev. vs. prolonged QRS in aVR; TWI aVL  Medications: tylenol 1g IV x1, meropenem 2mg IV, vancomycin 1250mg IVx1  Consults: ICU. Pt initially tachypneic w/ O2 90%, placed on NRB at 15L/mn, improved to 99%, then weaned down to 3L NC. PCP: Dr. Rosales Moore  Ph[facility]: [facility] [address]  - - - - -    HPI:     Mr. Son is an 87 yr old man with a PMH of HTN, HLD, CHF, Afib (s/p cardioversion), T2D, CKD3 (BL Cr 1.5-1.8), gout, GERD, GI AVMs (hx bleeds) who p/w SOB, SAENZ, with fevers, chills x1 day     Pt follows with GI Dr. Rangel for dark stools x6 months. Capsule endoscopy study ~10 days prior to admission showed new AVMs. Then came off eliquis 10 days ago in preparation for EGD 7/21. EGD showed active bleeding, but GI unable to reach active bleed site. Post-procedure he was feeling well, but then symptoms started ~6pm in evening. They included "difficulty" with deep breaths, as well as chills, shakes, subjective fevers, and generalized leg weakness. Also had SOB and dry cough, which he endorsed to prior provider was ongoing for last few days. He presented to ED for further evaluation.    Of note, on prior interview this admission, pt endorsed feeling weak x 6mo and SOB since cardioversion in 2/2021 with Dr. Rubén Dan at [facility]. No sweats, appetite changes, chest pain, palpitations, or GI symptoms. Has had GI workup in past with polyps, and family history of colon cancer.    In the ED:  Initial vital signs: T: 97.8 F, HR: 68, BP: 157/80, RR: 19, SpO2: 99% on NRB 15 L/min  ED course:   Labs: significant for WBC 10.88, Hgb 10.4, Plt 189, BUN 36, Cr 1.59 (baseline ~1.5-1.8), GFR 38, Glucose 221, UA unremarkable  Imaging:  CXR: infiltrate in L mid lung  EKG: AFib, L axis dev, first degree AV block, RBBB, ST elev. vs. prolonged QRS in aVR; TWI aVL  Medications: tylenol 1g IV x1, meropenem 2mg IV, vancomycin 1250mg IVx1  Consults: ICU. Pt initially tachypneic w/ O2 90%, placed on NRB at 15L/mn, improved to 99%, then weaned down to 3L NC.

## 2021-07-22 NOTE — H&P ADULT - NSHPLABSRESULTS_GEN_ALL_CORE
10.4   10.88 )-----------( 189      ( 2021 23:36 )             34.0     07-    139  |  101  |  36<H>  ----------------------------<  221<H>  4.7   |  26  |  1.59<H>    Ca    9.4      2021 23:36  Mg     2.0         TPro  7.3  /  Alb  4.0  /  TBili  0.6  /  DBili  x   /  AST  30  /  ALT  29  /  AlkPhos  91         Urinalysis Basic - ( 2021 00:26 )    Color: Yellow / Appearance: Clear / S.025 / pH: x  Gluc: x / Ketone: NEGATIVE  / Bili: Negative / Urobili: 0.2 E.U./dL   Blood: x / Protein: NEGATIVE mg/dL / Nitrite: NEGATIVE   Leuk Esterase: NEGATIVE / RBC: x / WBC x   Sq Epi: x / Non Sq Epi: x / Bacteria: x    PT/INR - ( 2021 23:36 )   PT: 12.3 sec;   INR: 1.03        PTT - ( 2021 23:36 )  PTT:33.3 sec    Lactate Trend   @ 23:36 Lactate:1.5     CAPILLARY BLOOD GLUCOSE

## 2021-07-22 NOTE — H&P ADULT - PROBLEM SELECTOR PLAN 7
Presentation:  - BP: 157/80 on admission Presentation:  - BP: 157/80 on admission  - takes metoprolol, unsure of dosages    Plan:  - med rec in AM and restart HF meds w/ hold parameters    #HLD  - restart home atorvastatin #CHF  Presentation:  - unknown baseline EF  - takes metoprolol and lasix, unsure of dosages  - endorsing feeling weak x 6mo with SOB since cardioversion in 2/2021, and SOB for last 6 months which he attributes to CHF, w/ exercise tolerance of ~50 feet    Plan:  - med rec in AM and restart HF meds w/ hold parameters  - TTE  - PT  - f/u outpt PCP/cardiology for CHF and AFib which may be contributing to pt's symptoms of SOB, SAENZ    #HTN  Presentation:  - BP: 157/80 on admission  - takes metoprolol, unsure of dosages    Plan:  - med rec in AM and restart HF meds w/ hold parameters  - f/u BNP Presentation:  - unknown baseline EF  - takes metoprolol and lasix, unsure of dosages  - endorsing feeling weak x 6mo with SOB since cardioversion in 2/2021, and SOB for last 6 months which he attributes to CHF, w/ exercise tolerance of ~50 feet  - exam w/ scattered crackles, leg edema to shins, no JVD    Plan:  - med rec in AM and restart HF meds w/ hold parameters  - f/u BNP  - TTE  - PT  - f/u outpt PCP/cardiology for CHF and AFib which may be contributing to pt's symptoms of SOB, SAENZ

## 2021-07-22 NOTE — H&P ADULT - PROBLEM SELECTOR PLAN 10
F: prn   E: prn   N: normal diet   DVT ppx: lovenox   GI ppx: PPI   dispo: [facility] F: prn   E: prn   N: clear liquid diet  DVT ppx: lovenox   GI ppx: PPI   dispo: [facility]

## 2021-07-22 NOTE — CONSULT NOTE ADULT - ASSESSMENT
Cardiovascular:    Pulmonary:    Neurology:    Gastrointestinal:    Genitourinary:    Endocrine:    ID:    Renal:    Heme:    Dispo: to be discussed with attending and fellow   Pt is an 86 yo M with PMH a-fib, gout, HTN, HLD, CHF, CKD3 (BL Cr 1.5-1.7), GERD, T2D, and s/p moderna x2 who p/w SOB, SAENZ, F/C, weakness x1d. ICU consulted for hypoxia.    Cardiovascular:    Pulmonary:    Neurology:    Gastrointestinal:    Genitourinary:    Endocrine:    ID:    Renal:    Heme:    Dispo: to be discussed with attending and fellow   Pt is an 88 yo M with PMH a-fib, gout, HTN, HLD, CHF, CKD3 (BL Cr 1.5-1.7), GERD, T2D, and s/p moderna x2 who p/w SOB, SAENZ, F/C, weakness x1d. ICU consulted for hypoxia.    Pulmonary:  #Acute hypoxic respiratory failure  - pt p/w F/C, SOB, cough post-endoscopy  - per chart review, O2sat 99% on 15L NRB on arrival -- 4L NC  - on exam, nasal prongs not just below pt nose overlying mustache with O2sat 92%. O2 off pt with O2sat 89% while conversing. 2L NC O2sat 94%.  - CXR with KELSY infiltrate  - c/f aspiration PNA after recent EGD with pneumonitis vs. CAP  - s/p meropenem 2g and vanc 1250mg in ER  - would recommend CTX 1g and azithromycin 500mg daily for CAP coverage --- PCN allergy = rash; has tolerated CTX 1/2021 admission  - c/w 2L NC PRN and wean as able    Gastrointestinal:  #Melena  - pt with longstanding hx melena, f/w Dr. Rangel and known hx GI AVMs with multiple GIB and EGD with clips  - s/p EGD today and per pt, no active bleed, 2 AVMs visualized but too deep to clip, and ulcers in small intestine that were biopsied  - H/H stable at present --- keep active T&S, transfuse Hb<7  - notify GI of admission in AM  - protonix 40mg/d  - ensure appropriate IV access    ID:  #Sepsis  - pt meeting 3/4 SIRS criteria (fever, tachypnea, leukocytosis) with pulmonary etiology  - lactate neg, UA neg  - CXR with KELSY infiltrate  - likely aspiration from EGD with pneumonitis vs. CAP  - management as above  - f/u BCx    Dispo: Pt would not currently benefit from 7L or ICU admission. Recommend regular medical floor. Discussed with intensivist and Dr. Way.   Pt is an 86 yo M with PMH a-fib, gout, HTN, HLD, CHF, CKD3 (BL Cr 1.5-1.7), GERD, T2D, and s/p moderna x2 who p/w SOB, SAENZ, F/C, weakness x1d. ICU consulted for hypoxia.    Pulmonary:  #Acute hypoxic respiratory failure  - pt p/w F/C, SOB, cough post-endoscopy  - per chart review, O2sat 99% on 15L NRB on arrival -- 4L NC  - on exam, nasal prongs not just below pt nose overlying mustache with O2sat 92%. O2 off pt with O2sat 89% while conversing. 2L NC O2sat 94%.  - CXR with KELSY infiltrate  - c/f aspiration PNA after recent EGD with pneumonitis vs. CAP  - s/p meropenem 2g and vanc 1250mg in ER  - would recommend CTX 1g and azithromycin 500mg daily for CAP coverage --- PCN allergy = rash; has tolerated CTX 1/2021 admission  - c/w 2L NC PRN and wean as able    Gastrointestinal:  #Melena  - pt with longstanding hx melena, f/w Dr. Rangel and known hx GI AVMs with multiple GIB and EGD with clips  - previously on eliquis for a-fib but s/p cardioversion 2/2021 and no longer on AC  - s/p EGD today and per pt, no active bleed, 2 AVMs visualized but too deep to clip, and ulcers in small intestine that were biopsied  - H/H stable at present --- keep active T&S, transfuse Hb<7  - notify GI of admission in AM  - protonix 40mg/d  - ensure appropriate IV access    ID:  #Sepsis  - pt meeting 3/4 SIRS criteria (fever, tachypnea, leukocytosis) with pulmonary etiology  - lactate neg, UA neg  - CXR with KELSY infiltrate  - likely aspiration from EGD with pneumonitis vs. CAP  - management as above  - f/u BCx    Dispo: Pt would not currently benefit from 7L or ICU admission. Recommend regular medical floor. Discussed with intensivist and Dr. Way.   Pt is an 86 yo M with PMH a-fib, gout, HTN, HLD, CHF, CKD3 (BL Cr 1.5-1.7), GERD, T2D, and s/p moderna x2 who p/w SOB, SAENZ, F/C, weakness x1d. ICU consulted for hypoxia.    Pulmonary:  #Acute hypoxic respiratory failure  - pt p/w F/C, SOB, cough post-endoscopy  - per chart review, O2sat 99% on 15L NRB on arrival -- 4L NC  - on exam, nasal prongs just below pt nose overlying mustache with O2sat 92%. O2 off pt with O2sat 89% while conversing. 2L NC O2sat 94%.  - CXR with KELSY infiltrate  - c/f aspiration PNA after recent EGD with pneumonitis vs. CAP  - s/p meropenem 2g and vanc 1250mg in ER  - would recommend CTX 1g and azithromycin 500mg daily for CAP coverage --- PCN allergy = rash; has tolerated CTX 1/2021 admission  - c/w 2L NC PRN and wean as able    Gastrointestinal:  #Melena  - pt with longstanding hx melena, f/w Dr. Rangel and known hx GI AVMs with multiple GIB and EGD with clips  - previously on eliquis for a-fib but s/p cardioversion 2/2021 and no longer on AC  - s/p EGD today and per pt, no active bleed, 2 AVMs visualized but too deep to clip, and ulcers in small intestine that were biopsied  - H/H stable at present --- keep active T&S, transfuse Hb<7  - notify GI of admission in AM  - protonix 40mg/d  - ensure appropriate IV access    ID:  #Sepsis  - pt meeting 3/4 SIRS criteria (fever, tachypnea, leukocytosis) with pulmonary etiology  - lactate neg, UA neg  - CXR with KELSY infiltrate  - likely aspiration from EGD with pneumonitis vs. CAP  - management as above  - f/u BCx    Dispo: Pt would not currently benefit from 7L or ICU admission. Recommend regular medical floor. Discussed with intensivist and Dr. Way.

## 2021-07-22 NOTE — H&P ADULT - NSHPSOCIALHISTORY_GEN_ALL_CORE
Former smoker  Drinks 1+ martini +- beer or glass of wine on most nights  No drugs  Independent in ADLs, rarely walks with cane for weakness/gait instability  Lives with wife  Retired

## 2021-07-22 NOTE — PHYSICAL THERAPY INITIAL EVALUATION ADULT - GAIT DEVIATIONS NOTED, PT EVAL
Unsteadiness with tremors; WB ability impaired due to L knee sprain from previous fall per pt report. Pt reported usually walks with a tremor. Desat to 87% from 96%, recovered quickly./decreased pily/decreased step length/decreased weight-shifting ability

## 2021-07-22 NOTE — PROGRESS NOTE ADULT - PROBLEM SELECTOR PLAN 5
Presentation:  - EKG: AFib, L axis dev, first degree AV block, RBBB, ST elev. vs. prolonged QRS in aVR; TWI aVL  - takes amiodarone, metoprolol; normally takes eliquis but recently off in preparation for endoscopy, and has been off it in past for GIB    Plan:  - off Eliquis for ~10 days in prep for endoscopy 7/21  - consult GI in AM to discuss any further procedures to stabilize AVM bleeding before pt can safely resume eliquis  - med rec in AM, restart amiodarone and metoprolol    #HTN  Presentation:  - BP: 157/80 on admission  - takes metoprolol, unsure of dosages    Plan:  - med rec in AM and restart HF meds w/ hold parameters

## 2021-07-22 NOTE — H&P ADULT - PROBLEM SELECTOR PLAN 3
Dr Juan Joiner pt.   Please advise Presentation:  - as above    Suspect aspiration PNA 2/2 endoscopy 7/21 vs. CAP vs. chemical pneumonitis  No underlying lung disease, hospitalization, or recent Abx use    Plan:  - c/w ___ abx Presentation:  - as above    Suspect aspiration PNA 2/2 endoscopy 7/21 vs. CAP i/s/o resp. sx possibly for last few days vs. chemical pneumonitis 2/2 endoscopy  No underlying lung disease, hospitalization, or recent Abx use    Plan:  - c/w cefepime/vanc abx  - add on procalcitonin Presentation:  - as above    Suspect aspiration PNA 2/2 endoscopy 7/21 vs. CAP i/s/o resp. sx possibly for last few days vs. chemical pneumonitis 2/2 endoscopy  No underlying lung disease, hospitalization, or recent Abx use    Plan:  - c/w ceftriaxone 1g IV q24 (to complete 5-7 day course, 7/21-) and flagyl 500 q8 PO (to complete 5-7 day course 7/21-) for c/f aspiration PNA  - c/w vancomycin 1g q24 for MRSA coverage given c/f asp. PNA acquired in hospital -> 2nd dose due 7/23 2am  - add on procalcitonin

## 2021-07-22 NOTE — H&P ADULT - NSHPPHYSICALEXAM_GEN_ALL_CORE
VITAL SIGNS:  T(C): 36.6 (07-22-21 @ 03:39), Max: 38.6 (07-21-21 @ 23:03)  T(F): 97.9 (07-22-21 @ 03:39), Max: 101.5 (07-21-21 @ 23:03)  HR: 58 (07-22-21 @ 03:39) (56 - 68)  BP: 107/56 (07-22-21 @ 03:39) (107/56 - 157/80)  BP(mean): --  RR: 18 (07-22-21 @ 03:39) (16 - 19)  SpO2: 94% (07-22-21 @ 03:39) (94% - 99%)  Wt(kg): --    PHYSICAL EXAM:    Constitutional: resting comfortably in bed; NAD  HEENT: NC/AT, EOMI, anicteric sclera, no nasal discharge; uvula midline, no oropharyngeal erythema or exudates, MMM;   Respiratory: scattered crackles in posterior mid-lower lung fields  Cardiac: +S1/S2; RRR; (+) 2-3/6 holosystolic murmur best heard at LUSB, non radiating to carotids, no JVD noted  Gastrointestinal: abdomen soft, NT/ND, no guarding  Extremities: legs/arms WWP, (+) 2+ peripheral edema to shins  Dermatologic: skin warm, dry and intact; no rashes, wounds, or scars noted  Neurologic: AAOx3; CNII-XII grossly intact; strength 5/5 in arms, sensation intact in all 4 extremities; no focal deficits noted  Psychiatric: affect and characteristics of appearance, verbalizations, behaviors are appropriate

## 2021-07-22 NOTE — CONSULT NOTE ADULT - SUBJECTIVE AND OBJECTIVE BOX
HPI:  87M  with a PMH of HTN, HLD, DM, CHF, Afib s/p cardioversion, CKD, GERD, and small bowel bleeding s/p enteroscopy p/w fatigue and dyspnea.  He was at [facility] for outpatient enteroscopy with scalloping, ulcer and exudates.  He tolerated the procedure and it was uncomplicated.  He was discharged him but reports feeling "groggy."  Over the course of the evening, he continues to have worsening shortness of breath and came to the ED.  Patient reports no change in BM since procedure.  Denies fever, chill, cp, abd pain, nausea, vomiting, dysuria.        Allergies    penicillin (Other (Mild))  sulfa drugs (Unknown)    Intolerances      Home Medications:  ALLOPURINOL 100 MG TABLET: 2 tab(s) orally once a day (22 Jul 2021 11:35)  AMIODARONE  MG TABLET: 1 tab(s) orally once a day (22 Jul 2021 11:35)  atorvastatin 40 mg oral tablet: 1 tab(s) orally once a day (22 Jul 2021 11:35)  Eliquis 2.5 mg oral tablet: 1 tab(s) orally 2 times a day  Last Picked up 04/2021 (22 Jul 2021 11:35)  FUROSEMIDE 20 MG TABLET: 1 tab(s) orally once a day (22 Jul 2021 11:35)  METOPROLOL ER SUCCINATE 25MG TABS: TAKE ONE TABLET BY MOUTH EVERY DAY (22 Jul 2021 04:33)  pantoprazole 20 mg oral delayed release tablet: 1 tab(s) orally once a day (22 Jul 2021 11:35)  QUINAPRIL 10 MG TABLET: 1 tab(s) orally once a day  Last picked up Jan 2021  (22 Jul 2021 11:35)    MEDICATIONS:  MEDICATIONS  (STANDING):  cefTRIAXone   IVPB 1000 milliGRAM(s) IV Intermittent every 24 hours  dextrose 40% Gel 15 Gram(s) Oral once  dextrose 5%. 1000 milliLiter(s) (50 mL/Hr) IV Continuous <Continuous>  dextrose 5%. 1000 milliLiter(s) (100 mL/Hr) IV Continuous <Continuous>  dextrose 50% Injectable 25 Gram(s) IV Push once  dextrose 50% Injectable 12.5 Gram(s) IV Push once  dextrose 50% Injectable 25 Gram(s) IV Push once  enoxaparin Injectable 40 milliGRAM(s) SubCutaneous daily  glucagon  Injectable 1 milliGRAM(s) IntraMuscular once  insulin lispro (ADMELOG) corrective regimen sliding scale   SubCutaneous Before meals and at bedtime  metroNIDAZOLE    Tablet 500 milliGRAM(s) Oral every 8 hours  pantoprazole    Tablet 40 milliGRAM(s) Oral before breakfast    MEDICATIONS  (PRN):    PAST MEDICAL & SURGICAL HISTORY:  Gout    Afib    HTN (hypertension)    HLD (hyperlipidemia)    CHF (congestive heart failure)    Chronic kidney disease (CKD)    GERD (gastroesophageal reflux disease)    Diabetes    AVM (arteriovenous malformation)    History of appendectomy  ~1961    History of partial colectomy  ~1996, performed for diverticulitis      FAMILY HISTORY:    SOCIAL HISTORY:  Tobacco:   Alcohol:  Illicit Drugs:    REVIEW OF SYSTEMS:  All other 10 review of systems is negative except as indicated in HPI    Vital Signs Last 24 Hrs  T(C): 36.5 (22 Jul 2021 08:49), Max: 38.6 (21 Jul 2021 23:03)  T(F): 97.7 (22 Jul 2021 08:49), Max: 101.5 (21 Jul 2021 23:03)  HR: 64 (22 Jul 2021 09:53) (52 - 68)  BP: 127/62 (22 Jul 2021 08:49) (107/56 - 157/80)  BP(mean): --  RR: 18 (22 Jul 2021 08:49) (16 - 19)  SpO2: 98% (22 Jul 2021 09:53) (94% - 99%)      PHYSICAL EXAM:    General: Elderly male laying comfortably in bed, in no acute distress  Eyes: moist conjunctivae, sclerae anicteric  HENT: Moist mucous membranes, NC in place  Neck: Trachea midline, supple  Lungs: Normal respiratory effort and no intercostal retractions  Cardiovascular: RRR, S1S2  Abdomen: Soft, non-tender non-distended; Normal bowel sounds; No rebound or guarding  Extremities: mild pitting edema  Neurological: Alert and oriented x3, nonfocal exam  Skin: Warm and dry. No obvious rash    LABS:                        9.4    7.71  )-----------( 155      ( 22 Jul 2021 08:27 )             31.2     07-22    137  |  102  |  37<H>  ----------------------------<  199<H>  4.5   |  25  |  1.46<H>    Ca    8.8      22 Jul 2021 08:27  Mg     2.0     07-21    TPro  7.3  /  Alb  4.0  /  TBili  0.6  /  DBili  x   /  AST  30  /  ALT  29  /  AlkPhos  91  07-21        PT/INR - ( 21 Jul 2021 23:36 )   PT: 12.3 sec;   INR: 1.03          PTT - ( 21 Jul 2021 23:36 )  PTT:33.3 sec    RADIOLOGY & ADDITIONAL STUDIES:   reviewed

## 2021-07-22 NOTE — CONSULT NOTE ADULT - ASSESSMENT
per Internal Medicine    88 yo M PMH HTN, HLD, CHF, Afib (s/p cardioversion), T2D, CKD3 (BL Cr 1.5-1.7), gout, GERD, GI AVMs (hx bleeds), presenting with SOB, SAENZ, fevers, chills x1 day, admitted for acute respiratory failure 2/2 suspected PNA    Problem/Plan - 1:  ·  Problem: Acute respiratory failure with hypoxia.  Plan: After endoscopy 7/21 in the morning, patient presented in ED with SOB, chills, fever, dry cough, weakness, sore throat. Initially he was tachypneic w/ O2 90%, placed on NRB at 15L/mn, improved to 99%, then weaned down to 3L NC. In the exam he presented with posterior crackles. His CXR were positive for infiltrate in L mid lung. ABG values were .    Plan:  - wean O2 as tolerated  - incentive spirometry  - treat underlying suspected PNA.     Problem/Plan - 2:  ·  Problem: SIRS (systemic inflammatory response syndrome).  Plan: Patient met 1/4 of SIRS criteria. He was w/ fever on admission; no leukocytosis >12, tachycardia >90, or tachypnea >20. He had chills, shakes, subjective fevers at home. His WBC was 10.88, his lactate unremarkable. In ED the next medications were started: Tylenol 1g IV x1, meropenem 2mg IV, vancomycin 1250mg IVx1. In the CXR, pt had an infiltrate in L mid lung and his UA was unremarkable. No initial blood cultures were available.    Plan:  - treat underlying suspected PNA with ceftriaxone 1g q24h IV and metronidazole 500mg q8h PO during 5 days and Tylenol if fever.    Problem/Plan - 3:  ·  Problem: Pneumonia.  Plan: Suspect aspiration PNA 2/2 endoscopy 7/21 vs. CAP i/s/o resp. sx possibly for last few days vs. chemical pneumonitis 2/2 endoscopy  No underlying lung disease, hospitalization, or recent Abx use    Plan:  - c/w ceftriaxone 1g IV q24 (to complete 5-7 day course, 7/21-) and flagyl 500 q8 PO (to complete 5-7 day course 7/21-) for c/f aspiration PNA  - c/w vancomycin 1g q24 for MRSA coverage given c/f asp. PNA acquired in hospital -> 2nd dose due 7/23 2am  - add on procalcitonin.    Problem/Plan - 4:  ·  Problem: Melena.  Plan: Presentation:  - hx dark stools x ~6 months, follows w/ Dr. 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 of GIB    #Anemia  Normocytic anemia to Hgb 10.4, MCV 96.3 on admission. Baseline Hgb ~8-10. Suspect 2/2 KAIN vs. ACD i/s/o CKD.  - f/u iron panel, b12, folate outpatient (or inpatient if stays>1 day).     Problem/Plan - 5:  ·  Problem: Afib.  Plan: Presentation:  - EKG: AFib, L axis dev, first degree AV block, RBBB, ST elev. vs. prolonged QRS in aVR; TWI aVL  - takes amiodarone, metoprolol; normally takes eliquis but recently off in preparation for endoscopy, and has been off it in past for GIB    Plan:  - off Eliquis for ~10 days in prep for endoscopy 7/21  - consult GI in AM to discuss any further procedures to stabilize AVM bleeding before pt can safely resume eliquis  - med rec in AM, restart amiodarone and metoprolol    #HTN  Presentation:  - BP: 157/80 on admission  - takes metoprolol, unsure of dosages    Plan:  - med rec in AM and restart HF meds w/ hold parameters.     Problem/Plan - 6:  Problem: Diabetes. Plan: Presentation:  - Glucose 221 on admission  - last a1c 8.0 (1/26/21)    Plan:  - ISS  - f/u a1c    #HLD  - restart home atorvastatin.    Problem/Plan - 7:  ·  Problem: CHF (congestive heart failure).  Plan: Presentation:  - unknown baseline EF  - takes metoprolol and lasix, unsure of dosages  - endorsing feeling weak x 6mo with SOB since cardioversion in 2/2021, and SOB for last 6 months which he attributes to CHF, w/ exercise tolerance of ~50 feet  - exam w/ scattered crackles, leg edema to shins, no JVD    Plan:  - med rec in AM and restart HF meds w/ hold parameters  - f/u BNP  - TTE  - PT  - f/u outpt PCP/cardiology for CHF and AFib which may be contributing to pt's symptoms of SOB, SAENZ.     Problem/Plan - 8:  ·  Problem: Chronic kidney disease (CKD).  Plan: Presentation:  - Cr baseline seems to be 1.5-1.8 with GFR ~30s c/w CKD III  -Cr on admission of 1.59    Plan:  - avoid nephrotoxics and renally dose meds  - IVF repletion (cautious w/ CHF) as needed  - monitor ins/outs  - renal f/u.     Problem/Plan - 9:  ·  Problem: GERD (gastroesophageal reflux disease).  Plan: Presentation:  - takes pantoprazole at home, unsure of dosage    Plan:  - cont. w/ home pantoprazole    #Gout  - home allopurinol -> redose to 50 daily for renal function.     Problem/Plan - 10:  Problem: Prophylactic measure. Plan; F: prn   E: prn   N: clear liquid diet  DVT ppx: lovenox   GI ppx: PPI   dispo: [facility].

## 2021-07-22 NOTE — PROGRESS NOTE ADULT - PROBLEM SELECTOR PLAN 1
Presentation:  - SOB, dry cough at home hours after endoscopy 7/21  - initially tachypneic w/ O2 90%, placed on NRB at 15L/mn, improved to 99%, then weaned down to 3L NC.  - VBG 7.36/48/65/27  - exam with posterior crackles  - CXR: infiltrate in L mid lung    Suspect 2/2 aspiration PNA s/p endoscopy same day vs. CAP vs. chemical pneumonitis    Plan:  - wean O2 as tolerated  - incentive spirometry  - treat underlying suspected PNA After endoscopy 7/21 in the morning, patient presented in ED with SOB, chills, fever, dry cough, weakness, sore throat. Initially he was tachypneic w/ O2 90%, placed on NRB at 15L/mn, improved to 99%, then weaned down to 3L NC. In the exam he presented with posterior crackles. His CXR were positive for infiltrate in L mid lung. ABG values were .    Plan:  - wean O2 as tolerated  - incentive spirometry  - treat underlying suspected PNA After endoscopy 7/21 in the morning, patient presented in ED with SOB, chills, fever, dry cough, weakness, sore throat. Initially he was tachypneic w/ O2 90%, placed on NRB at 15L/mn, improved to 99%, then weaned down to 3L NC. In the exam he presented with posterior crackles. His CXR were positive for infiltrate in L mid lung. VBG values were 7.36/48/65/27.    Plan:  - wean O2 as tolerated  - incentive spirometry  - treat underlying suspected PNA

## 2021-07-22 NOTE — PHYSICAL THERAPY INITIAL EVALUATION ADULT - GENERAL OBSERVATIONS, REHAB EVAL
PT IE Complete. Pt received semi-supine in bed, on 2.5L O2 NC, +bed alarm, NAD, pt agreeable to PT, MARIO Macias notified. Pt tolerated session fairly. Pt left as found eating breakfast with +call bell, NAD, +bed alarm, RN Colleen notified.

## 2021-07-22 NOTE — CONSULT NOTE ADULT - SUBJECTIVE AND OBJECTIVE BOX
ICU Consult Note    HPI:      REVIEW OF SYSTEMS // REPLACE    PAST MEDICAL & SURGICAL HISTORY:  Gout    Afib    HTN (hypertension)    HLD (hyperlipidemia)        FAMILY HISTORY:      SOCIAL HISTORY:  Smoker:  EtOH:  Drug Use:  Living situation:    Home Medications:  ALLOPURINOL 100 MG TABLET:  (2021 12:33)  AMIODARONE  MG TABLET:  (2021 12:33)  ATORVASTATIN 20 MG TABLET:  (2021 12:33)  ELIQUIS 5 MG TABLET: 0.5 tab(s) orally 2 times a day (2021 14:09)  FUROSEMIDE 20 MG TABLET:  (2021 12:33)  METOPROLOL ER SUCCINATE 25MG TABS: TAKE ONE TABLET BY MOUTH EVERY DAY (2021 12:33)  PANTOPRAZOLE 40MG TABLETS:  (2021 12:33)  QUINAPRIL 10 MG TABLET:  (2021 12:33)      MEDICATIONS  (STANDING):  vancomycin  IVPB. 1250 milliGRAM(s) IV Intermittent once    MEDICATIONS  (PRN):      Allergies    penicillin (Other (Mild))  sulfa drugs (Unknown)    Intolerances        PHYSICAL EXAM // REPLACE    LABS:                        10.4   10.88 )-----------( 189      ( 2021 23:36 )             34.0         139  |  101  |  36<H>  ----------------------------<  221<H>  4.7   |  26  |  1.59<H>    Ca    9.4      2021 23:36  Mg     2.0         TPro  7.3  /  Alb  4.0  /  TBili  0.6  /  DBili  x   /  AST  30  /  ALT  29  /  AlkPhos  91      PT/INR - ( 2021 23:36 )   PT: 12.3 sec;   INR: 1.03          PTT - ( 2021 23:36 )  PTT:33.3 sec      Lactate, Blood: 1.5 mmol/L (21 @ 23:36)            Urinalysis Basic - ( 2021 00:26 )    Color: Yellow / Appearance: Clear / S.025 / pH: x  Gluc: x / Ketone: NEGATIVE  / Bili: Negative / Urobili: 0.2 E.U./dL   Blood: x / Protein: NEGATIVE mg/dL / Nitrite: NEGATIVE   Leuk Esterase: NEGATIVE / RBC: x / WBC x   Sq Epi: x / Non Sq Epi: x / Bacteria: x            EKG:    RADIOLOGY & ADDITIONAL TESTS:   ICU Consult Note    HPI:  Pt is an 86 yo M with PMH a-fib, gout, HTN, HLD, CHF, CKD3 (BL Cr 1.5-1.7), GERD, T2D, and s/p moderna x2 who p/w SOB, SAENZ, F/C, weakness x1d. Had EGD done earlier today in setting of chronic reflux. Was feeling well afterwards but then symptoms started this evening. Has had a dry cough for the last few days.     On arrival, T 97.8--101.5, HR 68, /80, RR 19 O2sat 99% 15L NRB. Labs with WBC 10.88, 81.7% neutrophils, Hb 10.4, MCV 96, Cr 1.59, glu 221, lactate neg, VBG 7.36/48/65/27. UA neg. COVID neg. CXR with KELSY infiltrate. Pt given tylenol, meropenem 2g, and vancomycin. ICU consulted for hypoxia.    PAST MEDICAL & SURGICAL HISTORY:  Gout  Afib  HTN (hypertension)  HLD (hyperlipidemia)  T2D  CHF  CKD3    SOCIAL HISTORY:  Smoker:  EtOH:  Drug Use:  Living situation:    Home Medications:  ALLOPURINOL 100 MG TABLET:  (2021 12:33)  AMIODARONE  MG TABLET:  (2021 12:33)  ATORVASTATIN 20 MG TABLET:  (2021 12:33)  ELIQUIS 5 MG TABLET: 0.5 tab(s) orally 2 times a day (2021 14:09)  FUROSEMIDE 20 MG TABLET:  (2021 12:33)  METOPROLOL ER SUCCINATE 25MG TABS: TAKE ONE TABLET BY MOUTH EVERY DAY (2021 12:33)  PANTOPRAZOLE 40MG TABLETS:  (2021 12:33)  QUINAPRIL 10 MG TABLET:  (2021 12:33)    MEDICATIONS  (STANDING):  vancomycin  IVPB. 1250 milliGRAM(s) IV Intermittent once    MEDICATIONS  (PRN):    Allergies  penicillin (Other (Mild))  sulfa drugs (Unknown)    Intolerances    PHYSICAL EXAM // REPLACE    LABS:                        10.4   10.88 )-----------( 189      ( 2021 23:36 )             34.0     07-21    139  |  101  |  36<H>  ----------------------------<  221<H>  4.7   |  26  |  1.59<H>    Ca    9.4      2021 23:36  Mg     2.0         TPro  7.3  /  Alb  4.0  /  TBili  0.6  /  DBili  x   /  AST  30  /  ALT  29  /  AlkPhos  91      PT/INR - ( 2021 23:36 )   PT: 12.3 sec;   INR: 1.03          PTT - ( 2021 23:36 )  PTT:33.3 sec      Lactate, Blood: 1.5 mmol/L (21 @ 23:36)    Urinalysis Basic - ( 2021 00:26 )    Color: Yellow / Appearance: Clear / S.025 / pH: x  Gluc: x / Ketone: NEGATIVE  / Bili: Negative / Urobili: 0.2 E.U./dL   Blood: x / Protein: NEGATIVE mg/dL / Nitrite: NEGATIVE   Leuk Esterase: NEGATIVE / RBC: x / WBC x   Sq Epi: x / Non Sq Epi: x / Bacteria: x     ICU Consult Note    HPI:  Pt is an 86 yo M with PMH a-fib (off AC, s/p cardioversion), gout, HTN, HLD, CHF, CKD3 (BL Cr 1.5-1.7), GERD, T2D, GI AVMs (hx bleeds) and s/p moderna x2 who p/w SOB, SAENZ, F/C, weakness x1d. Had EGD done earlier today in setting of chronic reflux. Was feeling well afterwards but then symptoms started this evening. Has had a dry cough for the last few days. Per pt and wife bedside, 2mo dark stool for which he f/w Dr. Laurent. Has hx multiple GI AVMs with multiple episodes GIB requiring clips. Has undergone capsule endoscopies in the past. Scope today with 2 AVMs "too deep to clip" and ulcers in small intestine that were biopsied, per pt.     On arrival, T 97.8--101.5, HR 68, /80, RR 19 O2sat 99% 15L NRB. EKG NSR with 1st deg AV block, no ischemic changes. Labs with WBC 10.88, 81.7% neutrophils, Hb 10.4, MCV 96, Cr 1.59, glu 221, lactate neg, VBG 7.36/48/65/27. UA neg. COVID neg. CXR with KELSY infiltrate. Pt given tylenol, meropenem 2g, and vancomycin. ICU consulted for hypoxia.    PAST MEDICAL & SURGICAL HISTORY:  Gout  Afib  HTN (hypertension)  HLD (hyperlipidemia)  T2D  CHF  CKD3    SOCIAL HISTORY:  Smoker:  EtOH:  Drug Use:  Living situation:    Home Medications:  ALLOPURINOL 100 MG TABLET:  (2021 12:33)  AMIODARONE  MG TABLET:  (2021 12:33)  ATORVASTATIN 20 MG TABLET:  (2021 12:33)  ELIQUIS 5 MG TABLET: 0.5 tab(s) orally 2 times a day (2021 14:09)  FUROSEMIDE 20 MG TABLET:  (2021 12:33)  METOPROLOL ER SUCCINATE 25MG TABS: TAKE ONE TABLET BY MOUTH EVERY DAY (2021 12:33)  PANTOPRAZOLE 40MG TABLETS:  (2021 12:33)  QUINAPRIL 10 MG TABLET:  (2021 12:33)    MEDICATIONS  (STANDING):  vancomycin  IVPB. 1250 milliGRAM(s) IV Intermittent once    MEDICATIONS  (PRN):    Allergies  penicillin (Other (Mild))  sulfa drugs (Unknown)    Intolerances    PHYSICAL EXAM // REPLACE    LABS:                        10.4   10.88 )-----------( 189      ( 2021 23:36 )             34.0         139  |  101  |  36<H>  ----------------------------<  221<H>  4.7   |  26  |  1.59<H>    Ca    9.4      2021 23:36  Mg     2.0         TPro  7.3  /  Alb  4.0  /  TBili  0.6  /  DBili  x   /  AST  30  /  ALT  29  /  AlkPhos  91      PT/INR - ( 2021 23:36 )   PT: 12.3 sec;   INR: 1.03          PTT - ( 2021 23:36 )  PTT:33.3 sec      Lactate, Blood: 1.5 mmol/L (21 @ 23:36)    Urinalysis Basic - ( 2021 00:26 )    Color: Yellow / Appearance: Clear / S.025 / pH: x  Gluc: x / Ketone: NEGATIVE  / Bili: Negative / Urobili: 0.2 E.U./dL   Blood: x / Protein: NEGATIVE mg/dL / Nitrite: NEGATIVE   Leuk Esterase: NEGATIVE / RBC: x / WBC x   Sq Epi: x / Non Sq Epi: x / Bacteria: x     ICU Consult Note    HPI:  Pt is an 88 yo M with PMH a-fib (off AC, s/p cardioversion), gout, HTN, HLD, CHF, CKD3 (BL Cr 1.5-1.7), GERD, T2D, GI AVMs (hx bleeds) and s/p moderna x2 who p/w SOB, SAENZ, F/C (Tmax 100), weakness x1d. Had EGD done earlier today in setting of chronic reflux. Was feeling well afterwards but then symptoms started this evening. Has had a dry cough for the last few days. Per pt and wife bedside, 2mo dark stool for which he f/w Dr. Laurent. Has hx multiple GI AVMs with multiple episodes GIB requiring clips. Has undergone capsule endoscopies in the past. Scope today with 2 AVMs "too deep to clip" and ulcers in small intestine that were biopsied, per pt. In general has been feeling weak x6mo and SOB since cardioversion in 2021 with Dr. Rubén Dan at [facility]. His PCP is a cardiologist also, Dr. Rosales Moore. He is due for cards f/u in the next week and had an appt scheduled. Acutely, he complaints more of dry cough for the last few days and distended abdomen post procedure today with gas. Typically has 1-2 BMs a day with tarry stool intermixed with brown stool. No other complaints.    On arrival, T 97.8--101.5, HR 68, /80, RR 19 O2sat 99% 15L NRB. EKG NSR with 1st deg AV block, no ischemic changes. Labs with WBC 10.88, 81.7% neutrophils, Hb 10.4, MCV 96, Cr 1.59, glu 221, lactate neg, VBG 7.36/48/65/27. UA neg. COVID neg. CXR with KELSY infiltrate. Pt given tylenol, meropenem 2g, and vancomycin. ICU consulted for hypoxia. ICU team bedside and nasal prongs not just below pt nose overlying mustache with O2sat 92%. O2 off pt with O2sat 89% while conversing. 2L NC O2sat 94%. Of note, poor wave form on pulse ox intermittently.    PAST MEDICAL & SURGICAL HISTORY:  Gout  Afib  HTN (hypertension)  HLD (hyperlipidemia)  T2D  CHF  CKD3    SOCIAL HISTORY:  Smoker: denies  EtOH: denies  Drug Use: denies   Living situation: lives with wife, mostly fully independent; has some weakness/gait instability at times    Home Medications:  ALLOPURINOL 100 MG TABLET:  (2021 12:33)  AMIODARONE  MG TABLET:  (2021 12:33)  ATORVASTATIN 20 MG TABLET:  (2021 12:33)  ELIQUIS 5 MG TABLET: 0.5 tab(s) orally 2 times a day (2021 14:09)  FUROSEMIDE 20 MG TABLET:  (2021 12:33)  METOPROLOL ER SUCCINATE 25MG TABS: TAKE ONE TABLET BY MOUTH EVERY DAY (2021 12:33)  PANTOPRAZOLE 40MG TABLETS:  (2021 12:33)  QUINAPRIL 10 MG TABLET:  (2021 12:33)    MEDICATIONS  (STANDING):  vancomycin  IVPB. 1250 milliGRAM(s) IV Intermittent once    MEDICATIONS  (PRN):    Allergies  penicillin (Other (Mild))  sulfa drugs (Unknown)    Intolerances    VITAL SIGNS:  T(C): 37.3 (21 @ 00:55), Max: 38.6 (21 @ 23:03)  T(F): 99.2 (21 @ 00:55), Max: 101.5 (21 @ 23:03)  HR: 60 (21 @ 01:35) (60 - 68)  BP: 108/55 (21 @ 01:35) (108/55 - 157/80)  BP(mean): --  RR: 18 (21 @ 01:35) (18 - 19)  SpO2: 94% (21 @ 01:35) (94% - 99%)  Wt(kg): --    PHYSICAL EXAM:  Constitutional: WDWN resting comfortably in bed; NAD; obese M looks younger than stated age  Head: NC/AT  Eyes: PERRL, EOMI, anicteric sclera  ENT: no nasal discharge; MMM  Neck: supple; no JVD  Respiratory: CTA B/L; no W/R/R; nasal prongs not just below pt nose overlying mustache with O2sat 92%. O2 off pt with O2sat 89% while conversing. 2L NC O2sat 94%  Cardiac: +S1/S2; RRR; +decrescendo systolic murmur not radiating to carotids  Gastrointestinal: soft, NT; distended and tympanitic; no rebound or guarding; +BSx4  Extremities: WWP, no clubbing or cyanosis; no peripheral edema  Musculoskeletal: NROM x4; no joint swelling, tenderness or erythema  Vascular: 2+ radial, DP/PT pulses B/L  Dermatologic: skin warm, dry and intact; no rashes, wounds, or scars  Neurologic: AAOx3; CNII-XII grossly intact; no focal deficits  Psychiatric: affect and characteristics of appearance, verbalizations, behaviors are appropriate    LABS:                        10.4   10.88 )-----------( 189      ( 2021 23:36 )             34.0         139  |  101  |  36<H>  ----------------------------<  221<H>  4.7   |  26  |  1.59<H>    Ca    9.4      2021 23:36  Mg     2.0         TPro  7.3  /  Alb  4.0  /  TBili  0.6  /  DBili  x   /  AST  30  /  ALT  29  /  AlkPhos  91      PT/INR - ( 2021 23:36 )   PT: 12.3 sec;   INR: 1.03          PTT - ( 2021 23:36 )  PTT:33.3 sec      Lactate, Blood: 1.5 mmol/L (21 @ 23:36)    Urinalysis Basic - ( 2021 00:26 )    Color: Yellow / Appearance: Clear / S.025 / pH: x  Gluc: x / Ketone: NEGATIVE  / Bili: Negative / Urobili: 0.2 E.U./dL   Blood: x / Protein: NEGATIVE mg/dL / Nitrite: NEGATIVE   Leuk Esterase: NEGATIVE / RBC: x / WBC x   Sq Epi: x / Non Sq Epi: x / Bacteria: x     ICU Consult Note    HPI:  Pt is an 88 yo M with PMH a-fib (off AC, s/p cardioversion), gout, HTN, HLD, CHF, CKD3 (BL Cr 1.5-1.7), GERD, T2D, GI AVMs (hx bleeds) and s/p moderna x2 who p/w SOB, SAENZ, F/C (Tmax 100), weakness x1d. Had EGD done earlier today in setting of chronic reflux. Was feeling well afterwards but then symptoms started this evening. Has had a dry cough for the last few days. Per pt and wife bedside, 2mo dark stool for which he f/w Dr. Laurent. Has hx multiple GI AVMs with multiple episodes GIB requiring clips. Has undergone capsule endoscopies in the past. Scope today with 2 AVMs "too deep to clip" and ulcers in small intestine that were biopsied, per pt. In general has been feeling weak x6mo and SOB since cardioversion in 2021 with Dr. Rubén Dan at [facility]. His PCP is a cardiologist also, Dr. Rosales Moore. He is due for cards f/u in the next week and had an appt scheduled. Acutely, he complaints more of dry cough for the last few days and distended abdomen post procedure today with gas. Typically has 1-2 BMs a day with tarry stool intermixed with brown stool. No other complaints.    On arrival, T 97.8--101.5, HR 68, /80, RR 19 O2sat 99% 15L NRB. EKG NSR with 1st deg AV block, no ischemic changes. Labs with WBC 10.88, 81.7% neutrophils, Hb 10.4, MCV 96, Cr 1.59, glu 221, lactate neg, VBG 7.36/48/65/27. UA neg. COVID neg. CXR with KELSY infiltrate. Pt given tylenol, meropenem 2g, and vancomycin. ICU consulted for hypoxia. ICU team bedside and nasal prongs just below pt nose overlying mustache with O2sat 92%. O2 off pt with O2sat 89% while conversing. 2L NC O2sat 94%. Of note, poor wave form on pulse ox intermittently.    PAST MEDICAL & SURGICAL HISTORY:  Gout  Afib  HTN (hypertension)  HLD (hyperlipidemia)  T2D  CHF  CKD3    SOCIAL HISTORY:  Smoker: denies  EtOH: denies  Drug Use: denies   Living situation: lives with wife, mostly fully independent; has some weakness/gait instability at times    Home Medications:  ALLOPURINOL 100 MG TABLET:  (2021 12:33)  AMIODARONE  MG TABLET:  (2021 12:33)  ATORVASTATIN 20 MG TABLET:  (2021 12:33)  ELIQUIS 5 MG TABLET: 0.5 tab(s) orally 2 times a day (2021 14:09)  FUROSEMIDE 20 MG TABLET:  (2021 12:33)  METOPROLOL ER SUCCINATE 25MG TABS: TAKE ONE TABLET BY MOUTH EVERY DAY (2021 12:33)  PANTOPRAZOLE 40MG TABLETS:  (2021 12:)  QUINAPRIL 10 MG TABLET:  (2021 12:33)    MEDICATIONS  (STANDING):  vancomycin  IVPB. 1250 milliGRAM(s) IV Intermittent once    MEDICATIONS  (PRN):    Allergies  penicillin (Other (Mild))  sulfa drugs (Unknown)    Intolerances    VITAL SIGNS:  T(C): 37.3 (21 @ 00:55), Max: 38.6 (21 @ 23:03)  T(F): 99.2 (21 @ 00:55), Max: 101.5 (21 @ 23:03)  HR: 60 (21 @ 01:35) (60 - 68)  BP: 108/55 (21 @ 01:35) (108/55 - 157/80)  BP(mean): --  RR: 18 (21 @ 01:35) (18 - 19)  SpO2: 94% (21 @ 01:35) (94% - 99%)  Wt(kg): --    PHYSICAL EXAM:  Constitutional: WDWN resting comfortably in bed; NAD; obese M looks younger than stated age  Head: NC/AT  Eyes: PERRL, EOMI, anicteric sclera  ENT: no nasal discharge; MMM  Neck: supple; no JVD  Respiratory: CTA B/L; no W/R/R; nasal prongs not just below pt nose overlying mustache with O2sat 92%. O2 off pt with O2sat 89% while conversing. 2L NC O2sat 94%  Cardiac: +S1/S2; RRR; +decrescendo systolic murmur not radiating to carotids  Gastrointestinal: soft, NT; distended and tympanitic; no rebound or guarding; +BSx4  Extremities: WWP, no clubbing or cyanosis; no peripheral edema  Musculoskeletal: NROM x4; no joint swelling, tenderness or erythema  Vascular: 2+ radial, DP/PT pulses B/L  Dermatologic: skin warm, dry and intact; no rashes, wounds, or scars  Neurologic: AAOx3; CNII-XII grossly intact; no focal deficits  Psychiatric: affect and characteristics of appearance, verbalizations, behaviors are appropriate    LABS:                        10.4   10.88 )-----------( 189      ( 2021 23:36 )             34.0         139  |  101  |  36<H>  ----------------------------<  221<H>  4.7   |  26  |  1.59<H>    Ca    9.4      2021 23:36  Mg     2.0         TPro  7.3  /  Alb  4.0  /  TBili  0.6  /  DBili  x   /  AST  30  /  ALT  29  /  AlkPhos  91      PT/INR - ( 2021 23:36 )   PT: 12.3 sec;   INR: 1.03          PTT - ( 2021 23:36 )  PTT:33.3 sec      Lactate, Blood: 1.5 mmol/L (21 @ 23:36)    Urinalysis Basic - ( 2021 00:26 )    Color: Yellow / Appearance: Clear / S.025 / pH: x  Gluc: x / Ketone: NEGATIVE  / Bili: Negative / Urobili: 0.2 E.U./dL   Blood: x / Protein: NEGATIVE mg/dL / Nitrite: NEGATIVE   Leuk Esterase: NEGATIVE / RBC: x / WBC x   Sq Epi: x / Non Sq Epi: x / Bacteria: x

## 2021-07-22 NOTE — H&P ADULT - NSHPREVIEWOFSYSTEMS_GEN_ALL_CORE
General: (+) fevers, chills, generalized weakness; no sweats, weight changes, or appetite changes  CV: no chest pain or palpitations  Pulm: (+) SOB, dry cough   GI: no abd pain, n/v/d/c/dysphagia  : (+) urinary burning x4 months, (+) flank pain since being on lasix x 5 months; no dysuria or urine color changes  MSK: no joint or muscle pains  Derm: no rashes  Heme: no bleeding or bruising  Neuro: (+) occasional transient dizziness when getting out of bed; no numbness, tingling, headaches, fainting, or lightheadedness  All other ROS per HPI.

## 2021-07-22 NOTE — PATIENT PROFILE ADULT - NSPROMEDSADMININFO_GEN_A_NUR
RE: Plan of Care    Dear Dr. Kandace Baptiste MD    Thank you for referring Fabricio JESSICA Sladez. The following information reflects my assessment and plan of care.           Plan of Care 21   Effective from: 2021  Effective to: 2021    Plan ID: [id number]           Participants     Name Type Comments Contact Info    Kandace Baptiste MD Provider  [phone]    Raquel Hurst, PT PT             Fabricio Abdi MRN:[id number] (:1978 42 year old M)            Evaluation     Author: Raquel Hurst PT Status: Signed Last edited: 2021  3:15 PM       Referred by: Kandace Baptiste MD; Medical Diagnosis (from order):    Diagnosis Information      Diagnosis    724.2, 724.3 (ICD-9-CM) - M54.40 (ICD-10-CM) - Back pain of lumbar region with sciatica    781.2 (ICD-9-CM) - R26.9 (ICD-10-CM) - Gait disturbance                Physical Therapy -  Initial Evaluation    Visit:  1   Treatment Diagnosis: lumbar, symptoms with increased pain/symptoms, impaired posture, impaired range of motion, impaired muscle length/flexibility, impaired joint play/mobility, impaired strength, impaired gait, impaired activity tolerance  Date of onset: 12/3/2020  Chart reviewed at time of initial evaluation (relevant co-morbidities, allergies, tests and medications listed): Chart reviewed for history, medications, and allergies    Diagnostic Tests: X-ray: reviewed.      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  Took some medication, kept working.  Pain mild over next several days.  Returned to work and onset of pain again.  Few days later, pain/difficulty standing after sitting in car (commutes to [employer]); came to urgent care, x-rays normal, given medication.  Ongoing pain from right lower back, sometimes down to R ankle.  Current pain from right low back to right  posterior thigh.    Patient's primary language is Maori--video I pad  Jules #[id number] used throughout    Pain / Symptoms:  Pain rating (out of 10): Current: 5 ; Best: 4; Worst: 9  Location: R low back; R LE to ankle at times   Quality / Description: sore. Sometimes feels like \"nail\" in nerve or bone (comes and goes); mild low back pain constantly  Alleviating Factors: prescription medications, massage, heat. Exercise    Progression since onset: improved  Function:   Limitations / Exacerbation Factors: pain, increased time, difficulty, lower body dressing, sleep disturbed, bed mobility (sleep disturbance 2x/night; more pain first thing in morning getting out of bed), work - unable at this time (patient does construction work; off work since 12/10/20), walking, sitting, standing, lifting/carrying and bending/squatting/lifting (avoiding lifting; pain with prolonged sitting/standing), car transfers, community distances, stairs (using rails for stair climbing)  Prior Level of Function: pain free ADLs and IADLs,  Patient Goals: increased motion, decreased pain and return to work, Go back to work; lift up to 150# (usually 2 person); complete daily activities without difficulty; stand/walk for prolonged period    Prior treatment: no therapies  Discharged from hospital, home health, or skilled nursing facility in last 30 days: no    Home Environment:   Patient lives with significant other. children (wife and 5 kids (23, 18, 16, 13, 12))  Type of home: multiple level home  Assistance available: consistent  Feels safe at home/work/school.  2 or more falls or an unexplained fall with injury in the last year:  No    OBJECTIVE                                                                                                                     Observation:   Spine: increased lumbar lordosis  Seated Posture: poor  Correction of posture: makes symptoms better    Observed Gait:    Analysis: decreased dominique/pace;    •  Left: decreased arm swing, decreased lumbar pelvic rotation and decreased stride length    • Right: decreased arm swing, decreased stance time, decreased lumbar pelvic rotation and decreased stride length    Range of Motion (ROM)   (degrees unless noted; active unless noted; norms in ( ); negative=lacking to 0, positive=beyond 0)   Lumbar:    - Flexion(60-80):  30°  pain     - Extension (25):  10°     - Rotation (30/45):        • Left:  15°         • Right:  15°     - Side Bend (25-35):        • Left:  15°         • Right:  15°   Details / Comments: All lumbar motions slow and cautious; pain with forward flexion     Strength  (out of 5 unless noted, standard test position unless noted, lbs tested with hand held dynamometer)   Hip:    - Flexion:        • Left (L2-3): 5        • Right (L2-3): 5   Knee:    - Flexion:        • Left (L5-S1): 5        • Right (L5-S1): 5, pain    - Extension:        • Left (L3-4): 5        • Right (L3-4): 5, pain  Ankle:    - Dorsiflexion:        • Left (L4-5): 5        • Right (L4-5): 5   Lumbar:    - Upper Abdominals: 3+     Dermatome Testing:  L1 (back, over trochanter and groin):     Light Touch: Left: intact Right: intact  L2 (back, front of thigh to knee):     Light Touch: Left: intact Right: intact  L3 (back, upper buttock, anterior thigh, knee, medial lower leg):     Light Touch: Left: intact Right: intact  L4 (medial buttock, lateral thigh, medial leg, dorsum of foot, great toe):     Light Touch: Left: intact Right: intact  L5 (buttock, posterior and lateral thigh, lateral aspect of leg, dorsum of foot, medial half of sole, 1-3rd toes):     Light Touch: Left: intact Right: intact   S1 (buttock, thigh, posterior leg):     Light Touch: Left: intact Right: intact        Palpation:   Right Lower Extremity: Gluteus Medius:tenderness; Piriformis:tenderness;   Spine - Right: Lumbar Paraspinals: tenderness;     Comments / Details: Positive SI/ileum dysfunction: R posterior, able to  correct with MET    Muscle Flexibility:   Comments / Details:  B hamstring tightness; hamstring flexibility grossly 45 degrees supine (SLR)    Joint Play:   Lumbar:     - L4-L5: Left: pain and hypomobile Right: pain and hypomobile    - L5-S1: Left: pain and hypomobile Right: pain and hypomobile  Sacroiliac joint:  Left: hypomobile Right: hypomobile    Special Tests:  Straight Leg Raise:     Passive supine: Left: negative Right: negative    Comments / Details: Outcome Measures:   OSWESTRY Total Scored: 16  OSWESTRY Total Possible Score: 45  OSWESTRY Score Calculated: 35.56 %  (0-20% = minimal disability; 20-40% = moderate disability; 40-60% = severe disability; 60-80% = crippled; % = bed bound) see flowsheet for additional documentation  TREATMENT                                                                                                                initial evaluation completed    Therapeutic Exercise:  Prone lying  Prone on elbows  Isometric hip flexion R x 5 reps  Isometric hip adduction (bolster squeeze) x 5 reps    Manual Therapy:  Gentle B LE distraction in prone  Gentle lower lumbar PA glides grade II-III in prone    Therapeutic Activity:  Posture/body mechanics education including log rolling, sitting posture; avoiding bending with ADLs such as dressing lower body, brushing teeth, etc.    Skilled input: verbal instruction/cues    Writer verbally educated and received verbal consent for hand placement, positioning of patient, and techniques to be performed today from patient for clothing adjustments for techniques, therapist position for techniques and hand placement and palpation for techniques as described above and how they are pertinent to the patient's plan of care.    Home Exercise Program: Access Code: [email or web address]   URL: [email or web address]   Date: 01/19/2021   Prepared by: Raquel Hurst      Exercises  · Lying Prone - 2x daily - 7x weekly  · Prone Press Up on Elbows -  2x daily - 7x weekly  · Supine Hip Adduction Isometric with Ball - 5-10 reps - 1 sets - 2x daily - 7x weekly         ASSESSMENT                                                                                                           42 year old male patient has reported functional limitations listed above impacted by signs and symptoms consistent with below   • Involved: lumbar   • Symptoms/impairments: increased pain/symptoms, impaired posture, impaired range of motion, impaired muscle length/flexibility, impaired joint play/mobility, impaired strength, impaired gait and impaired activity tolerance  Patient is 42 year old male who presents to physical therapy with diagnosis of pain of lumbar region with sciatica, gait disturbance.  Patient reports approximate 6 week history of right sided low back pain and R LE symptoms, sometimes traveling to right ankle.  Patient demonstrates decreased lumbar ROM, decreased trunk strength, decreased posture, positive pain, positive R LE radicular symptoms and decreased functional abilities including prolonged sitting/standing/walking, dressing lower body, transfers, and lifting/carrying.  Patient is currently not working secondary to ongoing pain/difficulty.  Signs and symptoms consistent with impaired ROM and motor function associated with lumbar pain with right sciatica; lumbar spinal disorder with radiculopathy.  Pain/symptoms after session: 3    Prognosis: patient will benefit from skilled therapy  : very good  Predicted patient presentation: Low (stable) - Patient comorbidities and complexities, as defined above, will have little effect on progress for prescribed plan of care.  Patient Education:   Who will be receiving education: patient  Are they ready to learn: yes  Preferred learning style: written, verbal, demonstration and video  Barriers to learning: language Beninese speaking video I john  Jules #[id number] used throughout  Results of above outlined education:  Verbalizes understanding and Needs reinforcement      PLAN                                                                                                                         The following skilled interventions to be implemented to achieve goals listed below:  Gait Training (80582)  Manual Therapy (40216)  Neuromuscular Re-Education (63145)  Therapeutic Activity (52082)  Therapeutic Exercise (49252)    Frequency / Duration: 2 times per week tapering as patient progresses for an estimated total of 8 visits for 4 weeks    Patient involved in and agreed to plan of care and goals.  Suggestions for next session as indicated: Progress per plan of care      GOALS                                                                                                                           Decrease pain/symptoms to 0/10  Improve involved ROM to WFLs   The above improvements in impairments to assist in obtaining goals listed below  Long Term Goals: to be met be end of plan of care  1. Patient will complete independent lower body dressing without reported pain at prior level  2. Patient will stand/walk for greater than or equal to 3 hours without sitting break in preparation for return to work  3. Patient will lift/carry greater than or equal to 50# with proper body mechanics without reported pain in preparation for return to work  4. Patient will be independent with progressed and modified home exercise program.  5. Oswestry: Patient will complete form to reflect an improved score to less than or equal to 15% to indicate patient reported improvement in function/disability/impairment (minimal detectable change: 12%).  6. Patient will perform all transfers including getting in/out of bed, in/out of car without reported pain at prior level       Procedures and total treatment time documented Time Entry flowsheet.         Updated Participants     Name Type Comments Contact Info    Kandace Baptiste MD Provider  [phone]     Signature pending    Raquel Hurst PT PT                  Please complete the attached form to indicate your approval of the plan of care upon receipt.  Insurance compliance requires your approval be on file.  Should you have any questions, feel free to contact me.     [facility]AU[facility] Lopez [facility]  [address] [facility] [address]  Phone: [phone]  Fax: [phone]                RE: Plan of Care    I certify the need for these services, furnished under this plan of treatment and while under my care.  I agree with the plan of care as stated and request that therapy proceed.        __________________________________________________________________________________  MD Signature         Date   Time no concerns

## 2021-07-22 NOTE — H&P ADULT - PROBLEM SELECTOR PLAN 9
Presentation:  - takes pantoprazole at home, unsure of dosage    Plan:  - cont. w/ home pantoprazole    #Gout  - cont. home allopurinol Presentation:  - takes pantoprazole at home, unsure of dosage    Plan:  - cont. w/ home pantoprazole    #Gout  - home allopurinol -> redose to 50 daily for renal function

## 2021-07-22 NOTE — H&P ADULT - PROBLEM SELECTOR PLAN 1
Presentation:  - SOB, dry cough at home hours after endoscopy 7/21  - initially tachypneic w/ O2 90%, placed on NRB at 15L/mn, improved to 99%, then weaned down to 3L NC.  - VBG 7.36/48/65/27  - exam with posterior crackles  - CXR: infiltrate in L mid lung    Suspect 2/2 aspiration PNA s/p endoscopy same day vs. CAP vs. chemical pneumonitis    Plan:  - wean O2 as tolerated  - incentive spirometry Presentation:  - SOB, dry cough at home hours after endoscopy 7/21  - initially tachypneic w/ O2 90%, placed on NRB at 15L/mn, improved to 99%, then weaned down to 3L NC.  - VBG 7.36/48/65/27  - exam with posterior crackles  - CXR: infiltrate in L mid lung    Suspect 2/2 aspiration PNA s/p endoscopy same day vs. CAP vs. chemical pneumonitis    Plan:  - wean O2 as tolerated  - incentive spirometry  - treat underlying suspected PNA

## 2021-07-22 NOTE — H&P ADULT - PROBLEM SELECTOR PLAN 8
Presentation:  - Cr baseline seems to be 1.5-1.8 with GFR ~30s c/w CKDIII  -Cr on admission of 1.59    Plan:  - avoid nephrotoxics  - IVF repletion (cautious w/ CHF) as needed  - monitor ins/outs Presentation:  - Cr baseline seems to be 1.5-1.8 with GFR ~30s c/w CKD III  -Cr on admission of 1.59    Plan:  - avoid nephrotoxics and renally dose meds  - IVF repletion (cautious w/ CHF) as needed  - monitor ins/outs  - renal f/u

## 2021-07-22 NOTE — PROGRESS NOTE ADULT - PROBLEM SELECTOR PLAN 4
Presentation:  - hx dark stools x ~6 months, follows w/ Dr. 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 of GIB    #Anemia  Normocytic anemia to Hgb 10.4, MCV 96.3 on admission. Baseline Hgb ~8-10. Suspect 2/2 KAIN vs. ACD i/s/o CKD.  - f/u iron panel, b12, folate outpatient (or inpatient if stays>1 day) Hx dark tar-coal like stools x ~6 months, follows w/ Dr. Rangel. Recently diagnosed GI AVMs with multiple GIB and EGD with clips. Previously on Eliquis for AFib, and held prior to endoscopy 7/21, found no active bleed, 2 AVMs visualized but too deep to clip, and ulcers in small intestine that were biopsied.    Plan:  - maintain active T&S, transfuse Hb<7  - notify GI of admission in AM and commented Eliquis reintroduction.  - protonix 40mg q12h  - monitor for evid. of GIB    #Anemia  Normocytic anemia to Hgb 10.4, MCV 96.3 on admission. Baseline Hgb ~8-10. Suspect 2/2 KAIN vs. ACD i/s/o CKD.  - f/u iron panel, b12, folate outpatient (or inpatient if stays>1 day)

## 2021-07-22 NOTE — CONSULT NOTE ADULT - ASSESSMENT
87M  with a PMH of HTN, HLD, DM, CHF, Afib s/p cardioversion, CKD, GERD, and small bowel bleeding s/p enteroscopy p/w fatigue and dyspnea found to have pneumona.  Given recent procedure on left lateral, likely aspiration pneumonia.      #Pneumonia  Given recent procedure and timing of dyspnea, finding and presentation is consistent with aspiration pneumonia.    -Trend fever curve and white count  -Continue management per primary team    #Anemia  Patient with recent enteroscopy for small bowel bleeding found to have abnormal jejunal mucosa s/p biopsy.    -Followup pathology  -GI will continue to follow    Please schedule patient for follow-up on the fourth floor of [address] with [phone]

## 2021-07-22 NOTE — H&P ADULT - NSICDXPASTMEDICALHX_GEN_ALL_CORE_FT
PAST MEDICAL HISTORY:  Afib     AVM (arteriovenous malformation)     CHF (congestive heart failure)     Chronic kidney disease (CKD)     Diabetes     GERD (gastroesophageal reflux disease)     Gout     HLD (hyperlipidemia)     HTN (hypertension)

## 2021-07-22 NOTE — PHYSICAL THERAPY INITIAL EVALUATION ADULT - ADDITIONAL COMMENTS
Pt reports he lives in a couple places ( two apartments and one house with a farm). Pt reports he uses a cane or RW at times but not much. He does not use any other equipment but utilizes his headboard to help with bed mobility. Pt states he has fallen twice in the last 6 months with the most recent being gat his farm and the other in the home tripping over carpet. He was able to get up with help from family. He reports he is always with his wife and she helps him with ADLs as needed.

## 2021-07-23 ENCOUNTER — TRANSCRIPTION ENCOUNTER (OUTPATIENT)
Age: 86
End: 2021-07-23

## 2021-07-23 DIAGNOSIS — I27.20 PULMONARY HYPERTENSION, UNSPECIFIED: ICD-10-CM

## 2021-07-23 DIAGNOSIS — D50.0 IRON DEFICIENCY ANEMIA SECONDARY TO BLOOD LOSS (CHRONIC): ICD-10-CM

## 2021-07-23 DIAGNOSIS — J69.0 PNEUMONITIS DUE TO INHALATION OF FOOD AND VOMIT: ICD-10-CM

## 2021-07-23 DIAGNOSIS — Q27.30 ARTERIOVENOUS MALFORMATION, SITE UNSPECIFIED: ICD-10-CM

## 2021-07-23 DIAGNOSIS — M1A.9XX0 CHRONIC GOUT, UNSPECIFIED, WITHOUT TOPHUS (TOPHI): ICD-10-CM

## 2021-07-23 DIAGNOSIS — E11.9 TYPE 2 DIABETES MELLITUS WITHOUT COMPLICATIONS: ICD-10-CM

## 2021-07-23 DIAGNOSIS — I48.20 CHRONIC ATRIAL FIBRILLATION, UNSPECIFIED: ICD-10-CM

## 2021-07-23 DIAGNOSIS — N18.30 CHRONIC KIDNEY DISEASE, STAGE 3 UNSPECIFIED: ICD-10-CM

## 2021-07-23 DIAGNOSIS — I50.32 CHRONIC DIASTOLIC (CONGESTIVE) HEART FAILURE: ICD-10-CM

## 2021-07-23 LAB
ANION GAP SERPL CALC-SCNC: 10 MMOL/L — SIGNIFICANT CHANGE UP (ref 5–17)
BLD GP AB SCN SERPL QL: NEGATIVE — SIGNIFICANT CHANGE UP
BUN SERPL-MCNC: 32 MG/DL — HIGH (ref 7–23)
CALCIUM SERPL-MCNC: 8.9 MG/DL — SIGNIFICANT CHANGE UP (ref 8.4–10.5)
CHLORIDE SERPL-SCNC: 104 MMOL/L — SIGNIFICANT CHANGE UP (ref 96–108)
CO2 SERPL-SCNC: 27 MMOL/L — SIGNIFICANT CHANGE UP (ref 22–31)
COVID-19 SPIKE DOMAIN AB INTERP: POSITIVE
COVID-19 SPIKE DOMAIN ANTIBODY RESULT: >250 U/ML — HIGH
CREAT SERPL-MCNC: 1.58 MG/DL — HIGH (ref 0.5–1.3)
CULTURE RESULTS: SIGNIFICANT CHANGE UP
GLUCOSE BLDC GLUCOMTR-MCNC: 198 MG/DL — HIGH (ref 70–99)
GLUCOSE BLDC GLUCOMTR-MCNC: 227 MG/DL — HIGH (ref 70–99)
GLUCOSE BLDC GLUCOMTR-MCNC: 241 MG/DL — HIGH (ref 70–99)
GLUCOSE BLDC GLUCOMTR-MCNC: 281 MG/DL — HIGH (ref 70–99)
GLUCOSE SERPL-MCNC: 157 MG/DL — HIGH (ref 70–99)
HCT VFR BLD CALC: 30.9 % — LOW (ref 39–50)
HGB BLD-MCNC: 9.3 G/DL — LOW (ref 13–17)
MAGNESIUM SERPL-MCNC: 2.1 MG/DL — SIGNIFICANT CHANGE UP (ref 1.6–2.6)
MCHC RBC-ENTMCNC: 29.6 PG — SIGNIFICANT CHANGE UP (ref 27–34)
MCHC RBC-ENTMCNC: 30.1 GM/DL — LOW (ref 32–36)
MCV RBC AUTO: 98.4 FL — SIGNIFICANT CHANGE UP (ref 80–100)
NRBC # BLD: 0 /100 WBCS — SIGNIFICANT CHANGE UP (ref 0–0)
PLATELET # BLD AUTO: 169 K/UL — SIGNIFICANT CHANGE UP (ref 150–400)
POTASSIUM SERPL-MCNC: 4.3 MMOL/L — SIGNIFICANT CHANGE UP (ref 3.5–5.3)
POTASSIUM SERPL-SCNC: 4.3 MMOL/L — SIGNIFICANT CHANGE UP (ref 3.5–5.3)
RBC # BLD: 3.14 M/UL — LOW (ref 4.2–5.8)
RBC # FLD: 17.5 % — HIGH (ref 10.3–14.5)
RH IG SCN BLD-IMP: POSITIVE — SIGNIFICANT CHANGE UP
SARS-COV-2 IGG+IGM SERPL QL IA: >250 U/ML — HIGH
SARS-COV-2 IGG+IGM SERPL QL IA: POSITIVE
SODIUM SERPL-SCNC: 141 MMOL/L — SIGNIFICANT CHANGE UP (ref 135–145)
SPECIMEN SOURCE: SIGNIFICANT CHANGE UP
WBC # BLD: 5.8 K/UL — SIGNIFICANT CHANGE UP (ref 3.8–10.5)
WBC # FLD AUTO: 5.8 K/UL — SIGNIFICANT CHANGE UP (ref 3.8–10.5)

## 2021-07-23 PROCEDURE — 99233 SBSQ HOSP IP/OBS HIGH 50: CPT | Mod: GC

## 2021-07-23 PROCEDURE — 71045 X-RAY EXAM CHEST 1 VIEW: CPT | Mod: 26

## 2021-07-23 PROCEDURE — 99231 SBSQ HOSP IP/OBS SF/LOW 25: CPT

## 2021-07-23 RX ORDER — APIXABAN 2.5 MG/1
2.5 TABLET, FILM COATED ORAL EVERY 12 HOURS
Refills: 0 | Status: DISCONTINUED | OUTPATIENT
Start: 2021-07-23 | End: 2021-07-23

## 2021-07-23 RX ADMIN — Medication 500 MILLIGRAM(S): at 11:46

## 2021-07-23 RX ADMIN — PANTOPRAZOLE SODIUM 40 MILLIGRAM(S): 20 TABLET, DELAYED RELEASE ORAL at 07:10

## 2021-07-23 RX ADMIN — ENOXAPARIN SODIUM 40 MILLIGRAM(S): 100 INJECTION SUBCUTANEOUS at 11:45

## 2021-07-23 RX ADMIN — CEFTRIAXONE 100 MILLIGRAM(S): 500 INJECTION, POWDER, FOR SOLUTION INTRAMUSCULAR; INTRAVENOUS at 11:43

## 2021-07-23 RX ADMIN — Medication 3: at 12:58

## 2021-07-23 RX ADMIN — AMIODARONE HYDROCHLORIDE 200 MILLIGRAM(S): 400 TABLET ORAL at 13:00

## 2021-07-23 RX ADMIN — Medication 2: at 22:53

## 2021-07-23 RX ADMIN — Medication 1: at 17:48

## 2021-07-23 RX ADMIN — Medication 500 MILLIGRAM(S): at 17:49

## 2021-07-23 RX ADMIN — Medication 500 MILLIGRAM(S): at 00:39

## 2021-07-23 RX ADMIN — Medication 50 MILLIGRAM(S): at 11:43

## 2021-07-23 RX ADMIN — ATORVASTATIN CALCIUM 40 MILLIGRAM(S): 80 TABLET, FILM COATED ORAL at 22:52

## 2021-07-23 NOTE — PROGRESS NOTE ADULT - PROBLEM SELECTOR PLAN 6
Presentation:  - Glucose 221 on admission  - last a1c 8.0 (1/26/21)    Plan:  - ISS  - f/u a1c    #HLD  - restart home atorvastatin
Presentation:  - Glucose 221 on admission  - last a1c 8.0 (1/26/21)    Plan:  - ISS  - f/u a1c    #HLD  - restart home atorvastatin
holding ACE-I in setting of sepsis; monitor BMP, renally-dose rx

## 2021-07-23 NOTE — PROGRESS NOTE ADULT - PROBLEM SELECTOR PROBLEM 8
Chronic kidney disease (CKD)
Chronic kidney disease (CKD)
Chronic gout without tophus, unspecified cause, unspecified site

## 2021-07-23 NOTE — PROGRESS NOTE ADULT - PROBLEM SELECTOR PLAN 4
Hx dark tar-coal like stools x ~6 months, follows w/ Dr. Rangel. Recently diagnosed GI AVMs with multiple GIB and EGD with clips. Previously on Eliquis for AFib, and held prior to endoscopy 7/21, found no active bleed, 2 AVMs visualized but too deep to clip, and ulcers in small intestine that were biopsied.    Plan:  - maintain active T&S, transfuse Hb<7  - notify GI of admission in AM and commented Eliquis reintroduction.  - protonix 40mg q12h  - monitor for evid. of GIB    #Anemia  Normocytic anemia to Hgb 10.4, MCV 96.3 on admission. Baseline Hgb ~8-10. Suspect 2/2 KAIN vs. ACD i/s/o CKD.  - f/u iron panel, b12, folate outpatient (or inpatient if stays>1 day)

## 2021-07-23 NOTE — DISCHARGE NOTE PROVIDER - NSDCMRMEDTOKEN_GEN_ALL_CORE_FT
ALLOPURINOL 100 MG TABLET: 2 tab(s) orally once a day  AMIODARONE  MG TABLET: 1 tab(s) orally once a day  atorvastatin 40 mg oral tablet: 1 tab(s) orally once a day  Eliquis 2.5 mg oral tablet: 1 tab(s) orally 2 times a day  Last Picked up 04/2021  FUROSEMIDE 20 MG TABLET: 1 tab(s) orally once a day  METOPROLOL ER SUCCINATE 25MG TABS: TAKE ONE TABLET BY MOUTH EVERY DAY  pantoprazole 20 mg oral delayed release tablet: 1 tab(s) orally once a day  QUINAPRIL 10 MG TABLET: 1 tab(s) orally once a day  Last picked up Jan 2021    allopurinol: 50 milligram(s) orally once a day  AMIODARONE  MG TABLET: 1 tab(s) orally once a day  atorvastatin 40 mg oral tablet: 1 tab(s) orally once a day  cefpodoxime 200 mg oral tablet: 1 tab(s) orally 2 times a day   FUROSEMIDE 20 MG TABLET: 1 tab(s) orally once a day  METOPROLOL ER SUCCINATE 25MG TABS: TAKE ONE TABLET BY MOUTH EVERY DAY  pantoprazole 20 mg oral delayed release tablet: 1 tab(s) orally once a day

## 2021-07-23 NOTE — PROGRESS NOTE ADULT - PROBLEM SELECTOR PLAN 1
After endoscopy 7/21 in the morning, patient presented in ED with SOB, chills, fever, dry cough, weakness, sore throat. Initially he was tachypneic w/ O2 90%, placed on NRB at 15L/mn, improved to 99%, then weaned down to 3L NC. In the exam he presented with posterior crackles. His CXR were positive for infiltrate in L mid lung. VBG values were 7.36/48/65/27.    Plan:  - wean O2 as tolerated  - incentive spirometry  - treat underlying suspected PNA

## 2021-07-23 NOTE — PROGRESS NOTE ADULT - PROBLEM SELECTOR PROBLEM 1
Acute respiratory failure with hypoxia
Aspiration pneumonia of left lung, unspecified aspiration pneumonia type, unspecified part of lung
Acute respiratory failure with hypoxia

## 2021-07-23 NOTE — PROGRESS NOTE ADULT - PROBLEM SELECTOR PLAN 3
as seen on EGD 7/21; also found to have small bowel ulcers, but no active bleeding; known GERD; appreciate GI recs; cont. PPI; holding DOAC for now, until Pt. sees his cardiologist

## 2021-07-23 NOTE — PROGRESS NOTE ADULT - PROBLEM SELECTOR PLAN 2
d/t aspiration PNA; no e/o acute CHF; cont. mgmt as above, wean off O2 as tolerated, encourage I.S. use

## 2021-07-23 NOTE — DISCHARGE NOTE PROVIDER - HOSPITAL COURSE
87M PMH HTN, HLD, CHF, Afib (s/p cardioversion), T2D, CKD3 (BL Cr 1.5-1.7), gout, GERD, GI AVMs (hx bleeds), presenting with SOB, SAENZ, fevers, chills x1 day, admitted for acute respiratory failure 2/2 suspected PNA    Hospital course (by problem):   # Acute respiratory failure with hypoxia.  Plan: After endoscopy 7/21 in the morning, patient presented in ED with SOB, chills, fever, dry cough, weakness, sore throat. Initially he was tachypneic w/ O2 90%, placed on NRB at 15L/mn, improved to 99%, then weaned down to 3L NC. In the exam he presented with posterior crackles. His CXR were positive for infiltrate in L mid lung. VBG values were 7.36/48/65/27.  Plan:  -  - treat underlying suspected PNA.     # SIRS (systemic inflammatory response syndrome) associated to Aspirative Pneumonia.  Plan: Patient met 1/4 of SIRS criteria. He was w/ fever on admission; no leukocytosis >12, tachycardia >90, or tachypnea >20. He had chills, shakes, subjective fevers at home. His WBC was 10.88, his lactate unremarkable. In ED the next medications were started: Tylenol, meropenem and vancomycin. In floor, he was treated ocasionally with Vanco and suspended. In the CXR, pt had an infiltrate in L mid lung and his UA was unremarkable. No initial blood cultures were available.   Plan:  - Augmentine 875g PO q12 (to complete 7 day course, through 7/28) and flagyl 500 q8 PO (to complete 7 day course, through 7/28) for c/f aspiration PNA    # Suspect aspiration PNA 2/2 endoscopy 7/21 vs. CAP i/s/o resp. sx possibly for last few days vs. chemical pneumonitis 2/2 endoscopy. No underlying lung disease, hospitalization, or recent Abx use. He is eating without problem during the last days of his stay.    # Melena.  Plan: Hx dark tar-coal like stools x ~6 months, follows w/ Dr. Rangel. Recently diagnosed GI AVMs with multiple GIB and EGD with clips. Previously on Eliquis for AFib, and held prior to endoscopy 7/21, found no active bleed, 2 AVMs visualized but too deep to clip, and ulcers in small intestine that were biopsied. Currently, coal-tar like feces. GI comments anticoagulation doesn't need to be hold after the intervention anymore. Eliquis reintroduction will be managed outpatient in external consultation by cardiology.  Plan:  - Maintain active T&S, transfuse Hb<7  - protonix 40mg q12h  - monitor for evid. of GIB    #Anemia  Normocytic anemia to Hgb 10.4, MCV 96.3 on admission. Baseline Hgb ~8-10. Suspect 2/2 KAIN vs. ACD i/s/o CKD.  - f/u iron panel, b12, folate outpatient.     # Afib. Normally takes eliquis but recently off in preparation for endoscopy, and has been off it in past for GIB. EKG: AFib, L axis dev, first degree AV block, RBBB, ST elev. vs. prolonged QRS in aVR; TWI aVL  Plan:   - Placed back into amiodarone and metoprolol;   - Off Eliquis for ~10 days in prep for endoscopy 7/21. We decide to hold Eliquis till having a final recommendation from his cardiologist (pt has been in sinusal rhythm since the cardioversion, that took place 6 weeks ago).    - Consult GI in AM to discuss any further procedures to stabilize AVM bleeding before pt can safely resume eliquis    #HTN  BP: 157/80 on admission. Med rec and restart HF meds w/ hold parameters.  Plan: C/w metoprolol    #Diabetes. Glucose 221 on admission. Last Hba1c 8.0 (1/26/21). ISS during process.  Plan:  - C/w same home med.     #HLD  Plan:  - restart home atorvastatin.    #CHF (congestive heart failure). Unknown baseline EF. Takes metoprolol and lasix, unsure of dosages. Endorsing feeling weak x 6mo with SOB since cardioversion in 2/2021, and SOB for last 6 months which he attributes to CHF, w/ exercise tolerance of ~50 feet. Exam w/ scattered crackles, leg edema to shins, no JVD. TTE: Mild symmetric left ventricular hypertrophy. Moderately dilated left atrium. Moderate aortic stenosis. Mild mitral regurgitation. Moderate tricuspid regurgitation. Severe pulmonary hypertension.  Plan:  - Restart HF meds w/ hold parameters  - f/u BNP  - f/u outpt PCP/cardiology for CHF and AFib which may be contributing to pt's symptoms of SOB, SAENZ.     #Chronic kidney disease (CKD).  Cr baseline seems to be 1.5-1.8 with GFR ~30s c/w CKD III. Cr on admission of 1.59 and on discharte 1.70  Plan:  - avoid nephrotoxics and renally dose meds  - Renal f/u.     #GERD (gastroesophageal reflux disease).    Plan:  - cont. w/ home pantoprazole    #Gout  - home allopurinol.    Patient was discharged to: (home/[facility]/acute rehab/hospice, etc, and with what services – home health PT/RN? Home O2?)    New medications: Augmentine. Flagyl. Lobinox.   Changes to old medications:   Medications that were stopped: Eliquis (till cardiology outpatient evaluation)    Items to follow up as outpatient: PCP. Cardiology. Renal. Gastroenterology.    Physical exam at the time of discharge:     87M PMH HTN, HLD, CHF, Afib (s/p cardioversion), T2D, CKD3 (BL Cr 1.5-1.7), gout, GERD, GI AVMs (hx bleeds), presenting with SOB, SAENZ, fevers, chills x1 day, admitted for acute respiratory failure 2/2 suspected PNA    Hospital course (by problem):   # Acute respiratory failure with hypoxia.  Plan: After endoscopy 7/21 in the morning, patient presented in ED with SOB, chills, fever, dry cough, weakness, sore throat. Initially he was tachypneic w/ O2 90%, placed on NRB at 15L/mn, improved to 99%, then weaned down to 3L NC. In the exam he presented with posterior crackles. His CXR were positive for infiltrate in L mid lung. VBG values were 7.36/48/65/27.  Plan:  -  - treat underlying suspected PNA.     # SIRS (systemic inflammatory response syndrome) associated to Aspirative Pneumonia.  Plan: Patient met 1/4 of SIRS criteria. He was w/ fever on admission; no leukocytosis >12, tachycardia >90, or tachypnea >20. He had chills, shakes, subjective fevers at home. His WBC was 10.88, his lactate unremarkable. In ED the next medications were started: Tylenol, meropenem and vancomycin. In floor, he was treated ocasionally with Vanco and suspended. In the CXR, pt had an infiltrate in L mid lung and his UA was unremarkable. No initial blood cultures were available.   Plan:  - PO q12 (to complete 7 day course, through 7/28) and flagyl 500 q8 PO (to complete 7 day course, through 7/28) for c/f aspiration PNA    # Suspect aspiration PNA 2/2 endoscopy 7/21 vs. CAP i/s/o resp. sx possibly for last few days vs. chemical pneumonitis 2/2 endoscopy. No underlying lung disease, hospitalization, or recent Abx use. He is eating without problem during the last days of his stay.    # Melena.  Plan: Hx dark tar-coal like stools x ~6 months, follows w/ Dr. Rangel. Recently diagnosed GI AVMs with multiple GIB and EGD with clips. Previously on Eliquis for AFib, and held prior to endoscopy 7/21, found no active bleed, 2 AVMs visualized but too deep to clip, and ulcers in small intestine that were biopsied. Currently, coal-tar like feces. GI comments anticoagulation doesn't need to be hold after the intervention anymore. Eliquis reintroduction will be managed outpatient in external consultation by cardiology.  Plan:  - Maintain active T&S, transfuse Hb<7  - protonix 40mg q12h  - monitor for evid. of GIB    #Anemia  Normocytic anemia to Hgb 10.4, MCV 96.3 on admission. Baseline Hgb ~8-10. Suspect 2/2 KAIN vs. ACD i/s/o CKD.  - f/u iron panel, b12, folate outpatient.     # Afib. Normally takes eliquis but recently off in preparation for endoscopy, and has been off it in past for GIB. EKG: AFib, L axis dev, first degree AV block, RBBB, ST elev. vs. prolonged QRS in aVR; TWI aVL  Plan:   - Placed back into amiodarone and metoprolol;   - Off Eliquis for ~10 days in prep for endoscopy 7/21. We decide to hold Eliquis till having a final recommendation from his cardiologist (pt has been in sinusal rhythm since the cardioversion, that took place 6 weeks ago).    - Consult GI in AM to discuss any further procedures to stabilize AVM bleeding before pt can safely resume eliquis    #HTN  BP: 157/80 on admission. Med rec and restart HF meds w/ hold parameters.  Plan: C/w metoprolol    #Diabetes. Glucose 221 on admission. Last Hba1c 8.0 (1/26/21). ISS during process.  Plan:  - C/w same home med.     #HLD  Plan:  - restart home atorvastatin.    #CHF (congestive heart failure). Unknown baseline EF. Takes metoprolol and lasix, unsure of dosages. Endorsing feeling weak x 6mo with SOB since cardioversion in 2/2021, and SOB for last 6 months which he attributes to CHF, w/ exercise tolerance of ~50 feet. Exam w/ scattered crackles, leg edema to shins, no JVD. TTE: Mild symmetric left ventricular hypertrophy. Moderately dilated left atrium. Moderate aortic stenosis. Mild mitral regurgitation. Moderate tricuspid regurgitation. Severe pulmonary hypertension.  Plan:  - Restart HF meds w/ hold parameters  - f/u BNP  - f/u outpt PCP/cardiology for CHF and AFib which may be contributing to pt's symptoms of SOB, SAENZ.     #Chronic kidney disease (CKD).  Cr baseline seems to be 1.5-1.8 with GFR ~30s c/w CKD III. Cr on admission of 1.59 and on discharte 1.70  Plan:  - avoid nephrotoxics and renally dose meds  - Renal f/u.     #GERD (gastroesophageal reflux disease).    Plan:  - cont. w/ home pantoprazole    #Gout  - home allopurinol.    Patient was discharged to: (home/[facility]/acute rehab/hospice, etc, and with what services – home health PT/RN? Home O2?)    New medications: Augmentine. Flagyl. Lobinox.   Changes to old medications:   Medications that were stopped: Eliquis (till cardiology outpatient evaluation)    Items to follow up as outpatient: PCP. Cardiology. Renal. Gastroenterology.    Physical exam at the time of discharge:     87M PMH HTN, HLD, CHF, Afib (s/p cardioversion), T2D, CKD3 (BL Cr 1.5-1.7), gout, GERD, GI AVMs (hx bleeds), presenting with SOB, SAENZ, fevers, chills x1 day, admitted for acute respiratory failure 2/2 suspected aspiration PNA.    Hospital course (by problem):   # Acute respiratory failure with hypoxia.  Plan: After endoscopy 7/21 in the morning, patient presented in ED with SOB, chills, fever, dry cough, weakness, sore throat. Initially he was tachypneic w/ O2 90%, placed on NRB at 15L/mn, improved to 99%, then weaned down to 3L NC. In the exam he presented with posterior crackles. His CXR were positive for infiltrate in L mid lung. VBG values were 7.36/48/65/27.  - Improved w/ treatment of underlying suspected PNA.  - Weaned off oxygen, saturating well on room air at discharge     # SIRS (systemic inflammatory response syndrome) associated to Aspirative Pneumonia.  Plan: Patient met 1/4 of SIRS criteria. He was w/ fever on admission; no leukocytosis >12, tachycardia >90, or tachypnea >20. He had chills, shakes, subjective fevers at home. His WBC was 10.88, his lactate unremarkable. In ED the next medications were started: Tylenol, meropenem and vancomycin. In floor, he was treated ocasionally with Vanco and suspended. In the CXR, pt had an infiltrate in L mid lung and his UA was unremarkable. No initial blood cultures were available.   - Switched to Cefpodoxime 200mg PO q12 (to complete 7 day course, through 7/28) for aspiration PNA    # Suspect aspiration PNA 2/2 endoscopy 7/21 vs. CAP i/s/o resp. sx possibly for last few days vs. chemical pneumonitis 2/2 endoscopy. No underlying lung disease, hospitalization, or recent Abx use. He is eating without problem during the last days of his stay.    # Melena.  Plan: Hx dark tar-coal like stools x ~6 months, follows w/ Dr. Rangel. Recently diagnosed GI AVMs with multiple GIB and EGD with clips. Previously on Eliquis for AFib, and held prior to endoscopy 7/21, found no active bleed, 2 AVMs visualized but too deep to clip, and ulcers in small intestine that were biopsied. Currently, coal-tar like feces. GI comments anticoagulation doesn't need to be hold after the intervention anymore. Eliquis reintroduction will be managed outpatient in external consultation by cardiology.  - C/w home Protonix  - F/u w/ GI Dr. Rangel in 2 weeks    #Anemia  Normocytic anemia to Hgb 10.4, MCV 96.3 on admission. Baseline Hgb ~8-10. Suspect 2/2 KAIN vs. ACD i/s/o CKD.  - f/u iron panel, b12, folate outpatient.     # Afib. Normally takes eliquis but recently off x 3 weeks in preparation for endoscopy, and has been off it in past for GIB. EKG: AFib, L axis dev, first degree AV block, RBBB, ST elev. vs. prolonged QRS in aVR; TWI aVL  - Placed back into amiodarone and metoprolol;   - Off Eliquis for ~ 3 weeks in prep for endoscopy 7/21. Discussed risks and benefits of anticoagulation at this time including high risk for GIB and annual risk for stroke. Per pt, their outpatient PMD Dr. Rosales Moore was originally consulted and agreed that it was safe for pt to be off Eliquis in anticipation for EGD since pt was cardioverted >3 months prior in 2/2021 and has remained in NSR.  - Close f/u with cardiologist Dr. Dan scheduled for 7/26    #HTN  BP: 157/80 on admission.  - Restarted HF meds w/ hold parameters.    #Diabetes. Glucose 221 on admission. Last Hba1c 8.0 (1/26/21). ISS during process.    #HLD  - restarted home atorvastatin.    #CHF (congestive heart failure). Unknown baseline EF. Takes metoprolol and lasix, unsure of dosages. Endorsing feeling weak x 6mo with SOB since cardioversion in 2/2021, and SOB for last 6 months which he attributes to CHF, w/ exercise tolerance of ~50 feet. Exam w/ scattered crackles, leg edema to shins, no JVD. TTE: Mild symmetric left ventricular hypertrophy. Moderately dilated left atrium. Moderate aortic stenosis. Mild mitral regurgitation. Moderate tricuspid regurgitation. Severe pulmonary hypertension.  - Restarted HF meds w/ hold parameters  - f/u outpt PCP/cardiology for CHF and AFib which may be contributing to pt's symptoms of SOB, SAENZ.     #Chronic kidney disease (CKD).  Cr baseline seems to be 1.5-1.8 with GFR ~30s c/w CKD III. Cr on admission of 1.59 and on discharge 1.70    #GERD (gastroesophageal reflux disease).    Plan:  - cont. w/ home pantoprazole    #Gout  - home allopurinol renally adjusted to 50mg daily    Patient was discharged to: home    New medications: Cefpodoxime 200mg BID until 7/28  Changes to old medications: Allopurinol dose decreased to 50mg daily  Medications that were stopped: Eliquis (till cardiology outpatient evaluation)    Items to follow up as outpatient: PCP. Cardiology. Renal. Gastroenterology.    Physical exam at the time of discharge:  Constitutional: resting comfortably in bed; NAD  HEENT: NC/AT, EOMI, anicteric sclera, no nasal discharge; uvula midline, no oropharyngeal erythema or exudates, MMM;   Respiratory: scattered crackles in posterior mid-lower lung fields improved  Cardiac: +S1/S2; RRR; (+) 2-3/6 holosystolic murmur best heard at LUSB, non radiating to carotids, no JVD noted  Gastrointestinal: abdomen soft, NT/ND, no guarding, globulose, dull to percussion  Extremities: legs/arms WWP, (+)1+ peripheral edema to shins  Dermatologic: skin warm, dry and intact; no rashes, wounds, or scars noted  Neurologic: AAOx3; CNII-XII grossly intact; no focal deficits noted  Psychiatric: affect and characteristics of appearance, verbalizations, behaviors are appropriate

## 2021-07-23 NOTE — PROGRESS NOTE ADULT - PROBLEM SELECTOR PLAN 9
Presentation:  - takes pantoprazole at home, unsure of dosage    Plan:  - cont. w/ home pantoprazole    #Gout  - home allopurinol -> redose to 50 daily for renal function
cont. amiodarone; hold beta-blocker for bradycardia; A/C has been on hold in setting of melena, GI bleed work-up; no active bleed seen on EGD 7/21, but will continue holding A/C until Pt. sees his cardiologist (Dr. Rubén Dan at [facility])
Presentation:  - takes pantoprazole at home, unsure of dosage    Plan:  - cont. w/ home pantoprazole    #Gout  - home allopurinol -> redose to 50 daily for renal function

## 2021-07-23 NOTE — DISCHARGE NOTE PROVIDER - CARE PROVIDER_API CALL
MILENA LEUNG  [id number]  [address] [address]  Phone: [phone]  Fax: [phone]  Established Patient  Scheduled Appointment: 07/26/2021    Rosales Moore  [id number]  [address], [place] [id number]  Phone: [phone]  Fax: [phone]  Established Patient  Follow Up Time: 1 month    Tyshawn Rangel  Gastroenterology  [address] [address]  Phone: [phone]  Fax: [phone]  Established Patient  Follow Up Time: 2 weeks

## 2021-07-23 NOTE — PROGRESS NOTE ADULT - PROBLEM SELECTOR PROBLEM 9
GERD (gastroesophageal reflux disease)
Chronic atrial fibrillation
GERD (gastroesophageal reflux disease)

## 2021-07-23 NOTE — DISCHARGE NOTE PROVIDER - CARE PROVIDERS DIRECT ADDRESSES
,DirectAddress_Unknown,DirectAddress_Unknown,[email or web address]

## 2021-07-23 NOTE — PROGRESS NOTE ADULT - PROBLEM SELECTOR PLAN 2
Patient met 1/4 of SIRS criteria. He was w/ fever on admission; no leukocytosis >12, tachycardia >90, or tachypnea >20. He had chills, shakes, subjective fevers at home. His WBC was 10.88, his lactate unremarkable. In ED the next medications were started: Tylenol 1g IV x1, meropenem 2mg IV, vancomycin 1250mg IVx1. In the CXR, pt had an infiltrate in L mid lung and his UA was unremarkable. No initial blood cultures were available.    Plan:  - treat underlying suspected PNA with ceftriaxone 1g q24h IV and metronidazole 500mg q8h PO during 5 days and Tylenol if fever.

## 2021-07-23 NOTE — DISCHARGE NOTE PROVIDER - NSDCCPCAREPLAN_GEN_ALL_CORE_FT
PRINCIPAL DISCHARGE DIAGNOSIS  Diagnosis: Aspiration pneumonia  Assessment and Plan of Treatment: You were diagnosed with pneumonia, or an infection of the lungs during your admission to [facility]. This was noted based on your symptom profile and findings on chest X-ray. You were treated with antibiotics throughout your hospital course. Please continue to take Cefpodoxime 200mg twice daily until 7/28. Please follow-up with your primary care physician when you leave the hospital. Should you experience symptoms such as but not limited to: worsening shortness of breath, wheezing, severe cough, coughing bloody sputum, unrelenting/high fever (>103 F or lasting >3 days), and chest pain, please return to the emergency department for interval evaluation.      SECONDARY DISCHARGE DIAGNOSES  Diagnosis: Afib  Assessment and Plan of Treatment: Per our discussion, please continue to HOLD anticoagulation with Eliquis. DO NOT take Eliquis until you follow up with your cardiologist Dr. Dan scheduled 7/26/2021.    Diagnosis: Pulmonary hypertension  Assessment and Plan of Treatment: Please HOLD your home Amlodipine as your blood pressures were consistently below 130/80 while in the hospital. Please follow up with your cardiologist Dr. Dan on 7/26 regarding this medication.    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

## 2021-07-23 NOTE — PROGRESS NOTE ADULT - SUBJECTIVE AND OBJECTIVE BOX
Patient is a 87y old  Male who presents with a chief complaint of SOB, fever, chills x 1 day (2021 15:33)      INTERVAL HPI/OVERNIGHT EVENTS:    Pt. seen and examined earlier today  Pt.'s wife present at bedside  Pt. feels better today, reports less SAENZ and cough  No BM yet today  No F/C, CP, SOB, leg swelling    Review of Systems: 12 point review of systems otherwise negative    MEDICATIONS  (STANDING):  allopurinol 50 milliGRAM(s) Oral daily  aMIOdarone    Tablet 200 milliGRAM(s) Oral daily  atorvastatin 40 milliGRAM(s) Oral at bedtime  cefTRIAXone   IVPB 1000 milliGRAM(s) IV Intermittent every 24 hours  dextrose 40% Gel 15 Gram(s) Oral once  dextrose 5%. 1000 milliLiter(s) (50 mL/Hr) IV Continuous <Continuous>  dextrose 5%. 1000 milliLiter(s) (100 mL/Hr) IV Continuous <Continuous>  dextrose 50% Injectable 25 Gram(s) IV Push once  dextrose 50% Injectable 12.5 Gram(s) IV Push once  dextrose 50% Injectable 25 Gram(s) IV Push once  enoxaparin Injectable 40 milliGRAM(s) SubCutaneous daily  furosemide    Tablet 20 milliGRAM(s) Oral daily  glucagon  Injectable 1 milliGRAM(s) IntraMuscular once  insulin lispro (ADMELOG) corrective regimen sliding scale   SubCutaneous Before meals and at bedtime  metoprolol succinate ER 25 milliGRAM(s) Oral daily  metroNIDAZOLE    Tablet 500 milliGRAM(s) Oral every 8 hours  pantoprazole    Tablet 40 milliGRAM(s) Oral before breakfast    MEDICATIONS  (PRN):      Allergies    penicillin (Other (Mild))  sulfa drugs (Unknown)    Intolerances          Vital Signs Last 24 Hrs  T(C): 36.9 (2021 15:34), Max: 37.1 (2021 13:05)  T(F): 98.4 (2021 15:34), Max: 98.8 (2021 13:05)  HR: 57 (2021 15:34) (54 - 60)  BP: 101/37 (2021 15:34) (101/37 - 131/72)  BP(mean): --  RR: 18 (2021 15:34) (17 - 18)  SpO2: 96% (2021 15:34) (93% - 96%)  CAPILLARY BLOOD GLUCOSE      POCT Blood Glucose.: 281 mg/dL (2021 12:16)  POCT Blood Glucose.: 241 mg/dL (2021 09:22)  POCT Blood Glucose.: 212 mg/dL (2021 22:00)       @ 07:  -   @ 07:00  --------------------------------------------------------  IN: 0 mL / OUT: 200 mL / NET: -200 mL     @ 07:  -   @ 19:14  --------------------------------------------------------  IN: 0 mL / OUT: 500 mL / NET: -500 mL        Physical Exam:  (earlier today)  Daily     Daily   General:  well-appearing in NAD  HEENT:  MMM, no pallor  CV:  everett S1S2, no JVD  Lungs:  CTA B/L anteriorly, normal WOB on RA  Abdomen:  soft NT ND  Extremities:  no edema B/L LE  Skin:  WWP  Neuro:  AAOx3    LABS:                        9.3    5.80  )-----------( 169      ( 2021 08:01 )             30.9     07    141  |  104  |  32<H>  ----------------------------<  157<H>  4.3   |  27  |  1.58<H>    Ca    8.9      2021 08:01  Mg     2.1         TPro  7.3  /  Alb  4.0  /  TBili  0.6  /  DBili  x   /  AST  30  /  ALT  29  /  AlkPhos  91  07-21    PT/INR - ( 2021 23:36 )   PT: 12.3 sec;   INR: 1.03          PTT - ( 2021 23:36 )  PTT:33.3 sec  Urinalysis Basic - ( 2021 00:26 )    Color: Yellow / Appearance: Clear / S.025 / pH: x  Gluc: x / Ketone: NEGATIVE  / Bili: Negative / Urobili: 0.2 E.U./dL   Blood: x / Protein: NEGATIVE mg/dL / Nitrite: NEGATIVE   Leuk Esterase: NEGATIVE / RBC: x / WBC x   Sq Epi: x / Non Sq Epi: x / Bacteria: x

## 2021-07-23 NOTE — PROGRESS NOTE ADULT - TIME BILLING
Dispo: home pending wean off O2  has outpatient Cardiology and GI f/u  case d/w GI fellow and attending
oral

## 2021-07-23 NOTE — PROGRESS NOTE ADULT - PROBLEM SELECTOR PLAN 3
Suspect aspiration PNA 2/2 endoscopy 7/21 vs. CAP i/s/o resp. sx possibly for last few days vs. chemical pneumonitis 2/2 endoscopy  No underlying lung disease, hospitalization, or recent Abx use    Plan:  - c/w ceftriaxone 1g IV q24 (to complete 5-7 day course, 7/21-) and flagyl 500 q8 PO (to complete 5-7 day course 7/21-) for c/f aspiration PNA  - c/w vancomycin 1g q24 for MRSA coverage given c/f asp. PNA acquired in hospital -> 2nd dose due 7/23 2am  - add on procalcitonin

## 2021-07-23 NOTE — PROGRESS NOTE ADULT - PROBLEM SELECTOR PROBLEM 7
CHF (congestive heart failure)
CHF (congestive heart failure)
Type 2 diabetes mellitus without complication, without long-term current use of insulin

## 2021-07-23 NOTE — PROGRESS NOTE ADULT - SUBJECTIVE AND OBJECTIVE BOX
Pt seen and examined at bedside.  No acute event overnight and no new complaints.  Patient reports improvement in breathing.  Denies any bowel movement at this time.  Denies fever, chill, chest pain, shortness of breath, nausea, vomiting, hematemesis, diarrhea, constipation, melena, or hematochezia.     Allergies    penicillin (Other (Mild))  sulfa drugs (Unknown)    Intolerances      MEDICATIONS:  MEDICATIONS  (STANDING):  allopurinol 50 milliGRAM(s) Oral daily  aMIOdarone    Tablet 200 milliGRAM(s) Oral daily  atorvastatin 40 milliGRAM(s) Oral at bedtime  cefTRIAXone   IVPB 1000 milliGRAM(s) IV Intermittent every 24 hours  dextrose 40% Gel 15 Gram(s) Oral once  dextrose 5%. 1000 milliLiter(s) (50 mL/Hr) IV Continuous <Continuous>  dextrose 5%. 1000 milliLiter(s) (100 mL/Hr) IV Continuous <Continuous>  dextrose 50% Injectable 25 Gram(s) IV Push once  dextrose 50% Injectable 12.5 Gram(s) IV Push once  dextrose 50% Injectable 25 Gram(s) IV Push once  enoxaparin Injectable 40 milliGRAM(s) SubCutaneous daily  furosemide    Tablet 20 milliGRAM(s) Oral daily  glucagon  Injectable 1 milliGRAM(s) IntraMuscular once  insulin lispro (ADMELOG) corrective regimen sliding scale   SubCutaneous Before meals and at bedtime  metoprolol succinate ER 25 milliGRAM(s) Oral daily  metroNIDAZOLE    Tablet 500 milliGRAM(s) Oral every 8 hours  pantoprazole    Tablet 40 milliGRAM(s) Oral before breakfast    MEDICATIONS  (PRN):    Vital Signs Last 24 Hrs  T(C): 37.1 (2021 13:05), Max: 37.1 (2021 13:05)  T(F): 98.8 (2021 13:05), Max: 98.8 (2021 13:05)  HR: 55 (2021 13:05) (54 - 60)  BP: 115/80 (2021 13:05) (106/60 - 131/72)  BP(mean): --  RR: 18 (2021 13:05) (17 - 18)  SpO2: 95% (2021 13:05) (93% - 95%)    - @ 07:01  -  -23 @ 07:00  --------------------------------------------------------  IN: 0 mL / OUT: 200 mL / NET: -200 mL     @ 07:01  -   @ 13:38  --------------------------------------------------------  IN: 0 mL / OUT: 500 mL / NET: -500 mL      PHYSICAL EXAM:    General: Laying in bed, in no acute distress  HEENT: MMM, conjunctiva and sclera clear  Gastrointestinal: Soft non-tender non-distended; No rebound or guarding  Skin: Warm and dry. No obvious rash    LABS:                        9.3    5.80  )-----------( 169      ( 2021 08:01 )             30.9         141  |  104  |  32<H>  ----------------------------<  157<H>  4.3   |  27  |  1.58<H>    Ca    8.9      2021 08:01  Mg     2.1         TPro  7.3  /  Alb  4.0  /  TBili  0.6  /  DBili  x   /  AST  30  /  ALT  29  /  AlkPhos  91  07    PT/INR - ( 2021 23:36 )   PT: 12.3 sec;   INR: 1.03          PTT - ( 2021 23:36 )  PTT:33.3 sec      Urinalysis Basic - ( 2021 00:26 )    Color: Yellow / Appearance: Clear / S.025 / pH: x  Gluc: x / Ketone: NEGATIVE  / Bili: Negative / Urobili: 0.2 E.U./dL   Blood: x / Protein: NEGATIVE mg/dL / Nitrite: NEGATIVE   Leuk Esterase: NEGATIVE / RBC: x / WBC x   Sq Epi: x / Non Sq Epi: x / Bacteria: x                Culture - Blood (collected 2021 08:47)  Source: .Blood Blood-Peripheral  Preliminary Report (2021 09:00):    No growth at 1 day.    Culture - Blood (collected 2021 08:47)  Source: .Blood Blood-Peripheral  Preliminary Report (2021 09:00):    No growth at 1 day.    Culture - Urine (collected 2021 00:37)  Source: Clean Catch Clean Catch (Midstream)  Final Report (2021 08:36):    Insignificant amount of mixed growth.      RADIOLOGY & ADDITIONAL STUDIES: reviewed

## 2021-07-23 NOTE — DISCHARGE NOTE PROVIDER - PROVIDER TOKENS
PROVIDER:[TOKEN:[[id number]],SCHEDULEDAPPT:[07/26/2021],ESTABLISHEDPATIENT:[T]],PROVIDER:[TOKEN:[[id number]],FOLLOWUP:[1 month],ESTABLISHEDPATIENT:[T]],PROVIDER:[TOKEN:[[id number]],FOLLOWUP:[2 weeks],ESTABLISHEDPATIENT:[T]]

## 2021-07-23 NOTE — PROGRESS NOTE ADULT - PROBLEM SELECTOR PLAN 10
F: prn   E: prn   N: clear liquid diet  DVT ppx: lovenox   GI ppx: PPI   dispo: [facility]
F: prn   E: prn   N: clear liquid diet  DVT ppx: lovenox   GI ppx: PPI   dispo: [facility]
d/t AVMs and small bowel ulcers, as seen on EGD 7/21; not actively bleeding; monitor CBC; cont. PPI, as above

## 2021-07-23 NOTE — PROGRESS NOTE ADULT - PROBLEM SELECTOR PROBLEM 2
SIRS (systemic inflammatory response syndrome)
Acute respiratory failure with hypoxia
SIRS (systemic inflammatory response syndrome)

## 2021-07-23 NOTE — PROGRESS NOTE ADULT - PROBLEM SELECTOR PLAN 8
cont. allopurinol, low-purine diet
Presentation:  - Cr baseline seems to be 1.5-1.8 with GFR ~30s c/w CKD III  -Cr on admission of 1.59    Plan:  - avoid nephrotoxics and renally dose meds  - IVF repletion (cautious w/ CHF) as needed  - monitor ins/outs  - renal f/u
Presentation:  - Cr baseline seems to be 1.5-1.8 with GFR ~30s c/w CKD III  -Cr on admission of 1.59    Plan:  - avoid nephrotoxics and renally dose meds  - IVF repletion (cautious w/ CHF) as needed  - monitor ins/outs  - renal f/u

## 2021-07-23 NOTE — PROGRESS NOTE ADULT - PROBLEM SELECTOR PLAN 5
Presentation:  - EKG: AFib, L axis dev, first degree AV block, RBBB, ST elev. vs. prolonged QRS in aVR; TWI aVL  - takes amiodarone, metoprolol; normally takes eliquis but recently off in preparation for endoscopy, and has been off it in past for GIB    Plan:  - off Eliquis for ~10 days in prep for endoscopy 7/21  - consult GI in AM to discuss any further procedures to stabilize AVM bleeding before pt can safely resume eliquis  - med rec in AM, restart amiodarone and metoprolol    #HTN  Presentation:  - BP: 157/80 on admission  - takes metoprolol, unsure of dosages    Plan:  - med rec in AM and restart HF meds w/ hold parameters Presentation:  - EKG: AFib, L axis dev, first degree AV block, RBBB, ST elev. vs. prolonged QRS in aVR; TWI aVL  - takes amiodarone, metoprolol; normally takes eliquis but recently off in preparation for endoscopy, and has been off it in past for GIB    Plan:  - off Eliquis for ~10 days in prep for endoscopy 7/21. We decide to hold Eliquis till having a final recommendation from his cardiologist (pt has been in sinusal rhythm since the cardioversion, that took place 6 weeks ago).    - consult GI in AM to discuss any further procedures to stabilize AVM bleeding before pt can safely resume eliquis  - med rec in AM, restart amiodarone and metoprolol    #HTN  Presentation:  - BP: 157/80 on admission  - takes metoprolol, unsure of dosages    Plan:  - med rec in AM and restart HF meds w/ hold parameters

## 2021-07-23 NOTE — PROGRESS NOTE ADULT - SUBJECTIVE AND OBJECTIVE BOX
***Note in progress***    OVERNIGHT EVENTS: NAEO    SUBJECTIVE / INTERVAL HPI: Patient seen and examined at bedside. Patient denying chest pain, SOB, palpitations, cough. Patient denies fever, chills, HA, Dizziness, change in vision/hearing, N/V, abdominal pain, diarrhea, constipation, hematochezia/melena, dysuria, hematuria, new onset weakness/numbness, LE pain and/or swelling.    Remaining ROS negative     PHYSICAL EXAM:    General: WDWN  HEENT: NC/AT; PERRL, anicteric sclera; MMM  Neck: supple  Cardiovascular: +S1/S2, RRR  Respiratory: CTA B/L; no W/R/R  Gastrointestinal: soft, NT/ND; +BSx4  Extremities: WWP; no edema, clubbing or cyanosis  Vascular: 2+ radial, DP/PT pulses B/L  Neurological: AAOx3; no focal deficits  Psychiatric: pleasant mood and affect  Dermatologic: no appreciable wounds or damage to the skin    VITAL SIGNS:  Vital Signs Last 24 Hrs  T(C): 36.8 (2021 05:05), Max: 36.8 (2021 05:05)  T(F): 98.3 (2021 05:05), Max: 98.3 (2021 05:05)  HR: 54 (2021 05:05) (54 - 64)  BP: 106/60 (2021 05:05) (106/60 - 131/72)  RR: 17 (2021 05:05) (17 - 18)  SpO2: 94% (2021 05:05) (93% - 98%)    MEDICATIONS:  MEDICATIONS  (STANDING):  allopurinol 50 milliGRAM(s) Oral daily  aMIOdarone    Tablet 200 milliGRAM(s) Oral daily  atorvastatin 40 milliGRAM(s) Oral at bedtime  cefTRIAXone   IVPB 1000 milliGRAM(s) IV Intermittent every 24 hours  dextrose 40% Gel 15 Gram(s) Oral once  dextrose 5%. 1000 milliLiter(s) (50 mL/Hr) IV Continuous <Continuous>  dextrose 5%. 1000 milliLiter(s) (100 mL/Hr) IV Continuous <Continuous>  dextrose 50% Injectable 25 Gram(s) IV Push once  dextrose 50% Injectable 12.5 Gram(s) IV Push once  dextrose 50% Injectable 25 Gram(s) IV Push once  enoxaparin Injectable 40 milliGRAM(s) SubCutaneous daily  furosemide    Tablet 20 milliGRAM(s) Oral daily  glucagon  Injectable 1 milliGRAM(s) IntraMuscular once  insulin lispro (ADMELOG) corrective regimen sliding scale   SubCutaneous Before meals and at bedtime  lisinopril 10 milliGRAM(s) Oral daily  metoprolol succinate ER 25 milliGRAM(s) Oral daily  metroNIDAZOLE    Tablet 500 milliGRAM(s) Oral every 8 hours  pantoprazole    Tablet 40 milliGRAM(s) Oral before breakfast    ALLERGIES:  penicillin (Other (Mild))  sulfa drugs (Unknown)    LABS:                        9.4    7.71  )-----------( 155      ( 2021 08:27 )             31.2     07-22    137  |  102  |  37<H>  ----------------------------<  199<H>  4.5   |  25  |  1.46<H>    Ca    8.8      2021 08:27  Mg     2.0     07-    TPro  7.3  /  Alb  4.0  /  TBili  0.6  /  DBili  x   /  AST  30  /  ALT  29  /  AlkPhos  91  07-21    PT/INR - ( 2021 23:36 )   PT: 12.3 sec;   INR: 1.03       PTT - ( 2021 23:36 )  PTT:33.3 sec  Urinalysis Basic - ( 2021 00:26 )    Color: Yellow / Appearance: Clear / S.025 / pH: x  Gluc: x / Ketone: NEGATIVE  / Bili: Negative / Urobili: 0.2 E.U./dL   Blood: x / Protein: NEGATIVE mg/dL / Nitrite: NEGATIVE   Leuk Esterase: NEGATIVE / RBC: x / WBC x   Sq Epi: x / Non Sq Epi: x / Bacteria: x    CAPILLARY BLOOD GLUCOSE  POCT Blood Glucose.: 212 mg/dL (2021 22:00)    RADIOLOGY & ADDITIONAL TESTS: Reviewed. OVERNIGHT EVENTS: NAEO    SUBJECTIVE / INTERVAL HPI: Patient seen and examined at bedside. Patient is doing well. Patient denying chest pain, SOB, palpitations, cough. Patient denies fever, chills, HA, Dizziness, change in vision/hearing, N/V, abdominal pain, diarrhea, constipation, hematochezia/melena, dysuria, hematuria, new onset weakness/numbness, LE pain and/or swelling.    Remaining ROS negative     PHYSICAL EXAM:  Constitutional: resting comfortably in bed; NAD  HEENT: NC/AT, EOMI, anicteric sclera, no nasal discharge; uvula midline, no oropharyngeal erythema or exudates, MMM;   Respiratory: scattered crackles in posterior mid-lower lung fields  Cardiac: +S1/S2; RRR; (+) 2-3/6 holosystolic murmur best heard at LUSB, non radiating to carotids, no JVD noted  Gastrointestinal: abdomen soft, NT/ND, no guarding, globulose, dull to percussion  Extremities: legs/arms WWP, (+)1+ peripheral edema to shins  Dermatologic: skin warm, dry and intact; no rashes, wounds, or scars noted  Neurologic: AAOx3; CNII-XII grossly intact; strength 5/5 in arms, sensation intact in all 4 extremities; no focal deficits noted  Psychiatric: affect and characteristics of appearance, verbalizations, behaviors are appropriate    VITAL SIGNS:  Vital Signs Last 24 Hrs  T(C): 36.8 (2021 05:05), Max: 36.8 (2021 05:05)  T(F): 98.3 (2021 05:05), Max: 98.3 (2021 05:05)  HR: 54 (2021 05:05) (54 - 64)  BP: 106/60 (2021 05:05) (106/60 - 131/72)  RR: 17 (2021 05:05) (17 - 18)  SpO2: 94% (2021 05:05) (93% - 98%)    MEDICATIONS:  MEDICATIONS  (STANDING):  allopurinol 50 milliGRAM(s) Oral daily  aMIOdarone    Tablet 200 milliGRAM(s) Oral daily  atorvastatin 40 milliGRAM(s) Oral at bedtime  cefTRIAXone   IVPB 1000 milliGRAM(s) IV Intermittent every 24 hours  dextrose 40% Gel 15 Gram(s) Oral once  dextrose 5%. 1000 milliLiter(s) (50 mL/Hr) IV Continuous <Continuous>  dextrose 5%. 1000 milliLiter(s) (100 mL/Hr) IV Continuous <Continuous>  dextrose 50% Injectable 25 Gram(s) IV Push once  dextrose 50% Injectable 12.5 Gram(s) IV Push once  dextrose 50% Injectable 25 Gram(s) IV Push once  enoxaparin Injectable 40 milliGRAM(s) SubCutaneous daily  furosemide    Tablet 20 milliGRAM(s) Oral daily  glucagon  Injectable 1 milliGRAM(s) IntraMuscular once  insulin lispro (ADMELOG) corrective regimen sliding scale   SubCutaneous Before meals and at bedtime  lisinopril 10 milliGRAM(s) Oral daily  metoprolol succinate ER 25 milliGRAM(s) Oral daily  metroNIDAZOLE    Tablet 500 milliGRAM(s) Oral every 8 hours  pantoprazole    Tablet 40 milliGRAM(s) Oral before breakfast    ALLERGIES:  penicillin (Other (Mild))  sulfa drugs (Unknown)    LABS:                        9.4    7.71  )-----------( 155      ( 2021 08:27 )             31.2     07-    137  |  102  |  37<H>  ----------------------------<  199<H>  4.5   |  25  |  1.46<H>    Ca    8.8      2021 08:27  Mg     2.0     07-    TPro  7.3  /  Alb  4.0  /  TBili  0.6  /  DBili  x   /  AST  30  /  ALT  29  /  AlkPhos  91  07-21    PT/INR - ( 2021 23:36 )   PT: 12.3 sec;   INR: 1.03       PTT - ( 2021 23:36 )  PTT:33.3 sec  Urinalysis Basic - ( 2021 00:26 )    Color: Yellow / Appearance: Clear / S.025 / pH: x  Gluc: x / Ketone: NEGATIVE  / Bili: Negative / Urobili: 0.2 E.U./dL   Blood: x / Protein: NEGATIVE mg/dL / Nitrite: NEGATIVE   Leuk Esterase: NEGATIVE / RBC: x / WBC x   Sq Epi: x / Non Sq Epi: x / Bacteria: x    CAPILLARY BLOOD GLUCOSE  POCT Blood Glucose.: 212 mg/dL (2021 22:00)    RADIOLOGY & ADDITIONAL TESTS: Reviewed.

## 2021-07-24 ENCOUNTER — TRANSCRIPTION ENCOUNTER (OUTPATIENT)
Age: 86
End: 2021-07-24

## 2021-07-24 VITALS
TEMPERATURE: 98 F | OXYGEN SATURATION: 94 % | DIASTOLIC BLOOD PRESSURE: 62 MMHG | SYSTOLIC BLOOD PRESSURE: 138 MMHG | RESPIRATION RATE: 18 BRPM | HEART RATE: 64 BPM

## 2021-07-24 LAB
ANION GAP SERPL CALC-SCNC: 9 MMOL/L — SIGNIFICANT CHANGE UP (ref 5–17)
BUN SERPL-MCNC: 41 MG/DL — HIGH (ref 7–23)
CALCIUM SERPL-MCNC: 8.5 MG/DL — SIGNIFICANT CHANGE UP (ref 8.4–10.5)
CHLORIDE SERPL-SCNC: 104 MMOL/L — SIGNIFICANT CHANGE UP (ref 96–108)
CO2 SERPL-SCNC: 26 MMOL/L — SIGNIFICANT CHANGE UP (ref 22–31)
CREAT SERPL-MCNC: 1.7 MG/DL — HIGH (ref 0.5–1.3)
GLUCOSE BLDC GLUCOMTR-MCNC: 162 MG/DL — HIGH (ref 70–99)
GLUCOSE BLDC GLUCOMTR-MCNC: 180 MG/DL — HIGH (ref 70–99)
GLUCOSE SERPL-MCNC: 157 MG/DL — HIGH (ref 70–99)
HCT VFR BLD CALC: 28.4 % — LOW (ref 39–50)
HGB BLD-MCNC: 8.5 G/DL — LOW (ref 13–17)
MCHC RBC-ENTMCNC: 29.4 PG — SIGNIFICANT CHANGE UP (ref 27–34)
MCHC RBC-ENTMCNC: 29.9 GM/DL — LOW (ref 32–36)
MCV RBC AUTO: 98.3 FL — SIGNIFICANT CHANGE UP (ref 80–100)
NRBC # BLD: 0 /100 WBCS — SIGNIFICANT CHANGE UP (ref 0–0)
PLATELET # BLD AUTO: 168 K/UL — SIGNIFICANT CHANGE UP (ref 150–400)
POTASSIUM SERPL-MCNC: 4.8 MMOL/L — SIGNIFICANT CHANGE UP (ref 3.5–5.3)
POTASSIUM SERPL-SCNC: 4.8 MMOL/L — SIGNIFICANT CHANGE UP (ref 3.5–5.3)
RBC # BLD: 2.89 M/UL — LOW (ref 4.2–5.8)
RBC # FLD: 17.4 % — HIGH (ref 10.3–14.5)
SODIUM SERPL-SCNC: 139 MMOL/L — SIGNIFICANT CHANGE UP (ref 135–145)
WBC # BLD: 5.06 K/UL — SIGNIFICANT CHANGE UP (ref 3.8–10.5)
WBC # FLD AUTO: 5.06 K/UL — SIGNIFICANT CHANGE UP (ref 3.8–10.5)

## 2021-07-24 PROCEDURE — 80048 BASIC METABOLIC PNL TOTAL CA: CPT

## 2021-07-24 PROCEDURE — 86901 BLOOD TYPING SEROLOGIC RH(D): CPT

## 2021-07-24 PROCEDURE — 84132 ASSAY OF SERUM POTASSIUM: CPT

## 2021-07-24 PROCEDURE — 99285 EMERGENCY DEPT VISIT HI MDM: CPT

## 2021-07-24 PROCEDURE — 99239 HOSP IP/OBS DSCHRG MGMT >30: CPT

## 2021-07-24 PROCEDURE — 87635 SARS-COV-2 COVID-19 AMP PRB: CPT

## 2021-07-24 PROCEDURE — 85730 THROMBOPLASTIN TIME PARTIAL: CPT

## 2021-07-24 PROCEDURE — 82330 ASSAY OF CALCIUM: CPT

## 2021-07-24 PROCEDURE — 88305 TISSUE EXAM BY PATHOLOGIST: CPT

## 2021-07-24 PROCEDURE — 84295 ASSAY OF SERUM SODIUM: CPT

## 2021-07-24 PROCEDURE — 86900 BLOOD TYPING SEROLOGIC ABO: CPT

## 2021-07-24 PROCEDURE — 83605 ASSAY OF LACTIC ACID: CPT

## 2021-07-24 PROCEDURE — 85025 COMPLETE CBC W/AUTO DIFF WBC: CPT

## 2021-07-24 PROCEDURE — 97161 PT EVAL LOW COMPLEX 20 MIN: CPT

## 2021-07-24 PROCEDURE — 86769 SARS-COV-2 COVID-19 ANTIBODY: CPT

## 2021-07-24 PROCEDURE — 85027 COMPLETE CBC AUTOMATED: CPT

## 2021-07-24 PROCEDURE — 80053 COMPREHEN METABOLIC PANEL: CPT

## 2021-07-24 PROCEDURE — 97116 GAIT TRAINING THERAPY: CPT

## 2021-07-24 PROCEDURE — 71045 X-RAY EXAM CHEST 1 VIEW: CPT

## 2021-07-24 PROCEDURE — 83690 ASSAY OF LIPASE: CPT

## 2021-07-24 PROCEDURE — C8929: CPT

## 2021-07-24 PROCEDURE — 85610 PROTHROMBIN TIME: CPT

## 2021-07-24 PROCEDURE — 83735 ASSAY OF MAGNESIUM: CPT

## 2021-07-24 PROCEDURE — 36415 COLL VENOUS BLD VENIPUNCTURE: CPT

## 2021-07-24 PROCEDURE — 82962 GLUCOSE BLOOD TEST: CPT

## 2021-07-24 PROCEDURE — 87086 URINE CULTURE/COLONY COUNT: CPT

## 2021-07-24 PROCEDURE — 86850 RBC ANTIBODY SCREEN: CPT

## 2021-07-24 PROCEDURE — 82803 BLOOD GASES ANY COMBINATION: CPT

## 2021-07-24 PROCEDURE — 99231 SBSQ HOSP IP/OBS SF/LOW 25: CPT

## 2021-07-24 PROCEDURE — 81003 URINALYSIS AUTO W/O SCOPE: CPT

## 2021-07-24 PROCEDURE — 87040 BLOOD CULTURE FOR BACTERIA: CPT

## 2021-07-24 RX ORDER — APIXABAN 2.5 MG/1
1 TABLET, FILM COATED ORAL
Qty: 0 | Refills: 0 | DISCHARGE

## 2021-07-24 RX ORDER — ALLOPURINOL 300 MG
2 TABLET ORAL
Qty: 0 | Refills: 0 | DISCHARGE

## 2021-07-24 RX ORDER — QUINAPRIL HYDROCHLORIDE 40 MG/1
1 TABLET, FILM COATED ORAL
Qty: 0 | Refills: 0 | DISCHARGE

## 2021-07-24 RX ORDER — ALLOPURINOL 300 MG
50 TABLET ORAL
Qty: 0 | Refills: 0 | DISCHARGE
Start: 2021-07-24

## 2021-07-24 RX ADMIN — Medication 50 MILLIGRAM(S): at 12:22

## 2021-07-24 RX ADMIN — CEFTRIAXONE 100 MILLIGRAM(S): 500 INJECTION, POWDER, FOR SOLUTION INTRAMUSCULAR; INTRAVENOUS at 10:16

## 2021-07-24 RX ADMIN — Medication 1: at 12:56

## 2021-07-24 RX ADMIN — Medication 500 MILLIGRAM(S): at 12:22

## 2021-07-24 RX ADMIN — Medication 1: at 09:13

## 2021-07-24 RX ADMIN — ENOXAPARIN SODIUM 40 MILLIGRAM(S): 100 INJECTION SUBCUTANEOUS at 12:22

## 2021-07-24 RX ADMIN — PANTOPRAZOLE SODIUM 40 MILLIGRAM(S): 20 TABLET, DELAYED RELEASE ORAL at 07:24

## 2021-07-24 RX ADMIN — Medication 500 MILLIGRAM(S): at 03:24

## 2021-07-24 RX ADMIN — Medication 500 MILLIGRAM(S): at 18:51

## 2021-07-24 NOTE — DISCHARGE NOTE NURSING/CASE MANAGEMENT/SOCIAL WORK - NSSCCARECORD_GEN_ALL_CORE
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  Per employee, Rola  Mariela RN  Restrictions: 4 hour days , no push or pull more than 25 pounds for 8 weeks.  Full Duty Work Demands: heavy (more than 50 lbs), standing >50% of the day and occasionally helps with transferring someone and otherwise no heavy lifiing   Current Work Status: Working, restricted duty due to current condition increased to 5 hours per day and 40 # restriction as of 1/14/20 .  States she has been taken off of passing med/med cart and is doing treatments, head to toe assessments and charting.     Referred by: Nettie Bowen MD; Next provider visit (if known/scheduled): 12/9/19  Medical Diagnosis (from order):       Diagnosis Information             Diagnosis      724.4 (ICD-9-CM) - M54.16 (ICD-10-CM) - Right lumbar radiculopathy                  Treatment Diagnosis: Lumbar radicular symptoms L4-5 symptoms with increased pain/symptoms, headaches, impaired strength, impaired range of motion, impaired muscle length/flexibility, impaired mobility, impaired activity tolerance, impaired body mechanics     Date of onset/injury: 8/6/2019  Diagnosis Precautions: work restrictions above.  Chart reviewed at time of initial evaluation (relevant co-morbidities, allergies, tests and medications listed): asthma history. Smoker   MRI lumbar spine reviewed in chart.  Medications reviewed.     Case Notes/Attendance: (to be completed visit 6-8)  Contact with Adjustor /     · Adjustor / :  Rola   · Phone#:  [phone]  · Date of Communication: ; Results: Left voice message regarding progress to date. 11/20/2019  Attendance Concerns: cancelled  2/10/2020 due to illness. Got [place] traffic on [address] and unable to make it to therapy in time as all lanes were closed and at a dead stop.    SUBJECTIVE    Current Pain (0-10 scale) ) 0.5/10 low back  down right thigh to knee and no longer into her right foot and calf.Awaiting second epidural..She is to remain on 50 pound lift restriction with current hours : 5 hours a day for 4 more weeks.    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 Lifting restriction remains in effect.  Owestry Score 2/20/2020:16/50 or 32% limited  Functional Activity Level. No complaints with pulling patients up in bed assisted.   OBJECTIVE         Functional Status:  Functional Activity Patient  Abilities as of  Date: 2/3/2020 Job Demands  [] Employer [x]  Client Report   Lift floor to waist 30 lbs N   Lift waist to chest  30 pounds /taking medicationss out of med cart < 10 pounds.     Lift waist to overhead 10 pounds N   Two Hand Carry 30 pounds O Assisting patients up to 50# and has assist if lowering patient to floor or Greater than 50 pounds assist needed   Push/Pull Pushes patients in their wheel chairs without difficulty C  pushing med cart up to 30-40 #, wheelchair patients   Sustained overhead work No [x] N  [] O [] F  [] C    Sustained sitting 30-60  minutes [] N  [x] O [] F  [] C   Sustained standing Up to 5 hour shift with infrequent sit breaks [] N  [] O [] F  [x] C   Sustained kneeling No [x] N  [] O [] F  [] C    Areas blank above not yet assessed due to not medically appropriate.  Will be assessed when appropriate.  Definitions: Never (N); Occasionally (O) = up to 1/3 of the day; Frequently (F) = 1/3 to 2/3 of the day; Constantly (C) = 2/3 to the full day    Use of special tools or equipment requirements: none at this time   X   Posture: ERS right L 4-L5 , increased lordosis    Trunk active range of  motion: Flexion fingers to 2 inches from  ankles with good lumbar segmental mobility. Extension Normal as are rotations and side bend normal .   Strength core 4-/5 log test in stride stand and 5/5 in bilateral stance  Multifidi and trunk extensors 4-/5  Slump test sitting positive on right.  Treatment   Therapeutic Exercise:  Warm up prior to session on recumbent bike for 5 miles.   *Bent knee fall outs with orange band, bilateral knees flexed  * diagonals D 2  With band  While doing abdominals bracing with opposite knee lift. x10-12  Hip flexor stretch x 5   Iliotibial band and tensor fascia danny stretch supine.       Manual Therapy:  Functional Mobilization right tensor fascia danny , iliacus , and iliotibial band.    Neuromuscular reeducation:    Opposite upper and lower extremity extension x 10 reps prone  Prone opposite arm extensions x 10  Single leg hip extensions x 10 each.  Abdominal stabilization with orange band resisted bilateral shoulder flexion supine and reciprocal and diagonal arm motionsl    Reviewed clamshells and stressed small arc motion as she has trouble stabilizing at pelvis.   Lateral lunges in stand or bridge with resisted hip abduction with band also ok  To do as alternatives to clamshells.  Pelvic tilts posterior to slight anterior in multiple positions of hip hinge.      Reinforced using positional traction several times a day to try to keep radicular symptoms centralized or abated.  Skilled input: verbal instruction/cues, tactile instruction/cues, posture correction, education/instruction on HEP,  body mechanics with lifting.    Home Program:   Abdominal bracing x 10  Low effort with breath control  Hip Abduction A  x 10  Gluteal sets  Prone prop  Trial of quadruped extension single limb ok upper extremities, unable to stabilize for lower extremity extension  *Supine iliotibial band  and tensor fascia danny stretch 15 sec. Hold x 5reps daily  * Multifidus isometric light effort 6 second  holds reinforced  * prone foot push x 5-10 second holds x 8  Prone press ups extension 1-2 sets of 10 as helpful centralizing , decreasing intensity or abolishing symptoms  * Use of long handled shoe horn to assist her patients  * Scapula retraction with shoulder extension to neutral with orange band x 15 reps   *Clamshells with  Green  band x 10-15 right and orange on left.  *Wall slide squats with abdominal bracing x 10: and  3 pound dumb bells for arm lifts.  *Abdominal bracing with standing with lumbar roll.  Wesley position hip flexor stretch 3-5 reps each with 20 sec. Holds and omit half kneel stretch.  *Stand calf stretch with forefeet on book x 30 seconds x 3-5 reps  *Prone active progression single arm with opposite leg lifts x 5 each side  * standing iliotibial band stretch 20 sec. Holds x 3-5 reps daily.  *Stand horizontal rows   *One plus one knee lift with self resistance supine 90 degree hip flexion.  * Bent knee fall outs x 10 each  Wall planks  Ball size 75 cm or 30 inches to be obtained for HEP.Add day 18 and 19 ball work.  *W pinch  *Iliotibial band and tensor fascia danny stretch in supine after failed for stand stretch due to instability lumbar sacral  Multifidus isometrics 6 x a day x 6 reps.  Prone over ball single leg extensions supported only x 3-5 reps each.  Seated hip hinge with multi level ab bracing and Kegels , also lumbar flexion /extension segments in multiple angles in sit  and in 4 point rock  Writer verbally educated the patient and received verbal consent from the patient on hand placement, positioning of patient, and techniques to be performed today including clothing adjustments for techniques, therapist position for techniques, hand placement and palpation for techniques as described above and how they are pertinent to the patient's plan of care.     Suggestions for next session as indicated: progress per plan of care, , continue to progress core and pelvic girdle strengthening  and endurance unstable surface,Aerex  foam and Bosu ball in stand and  ball work in prone,  As well as squat/lateral  lunge and resisted retro walking , push pull functional activities.  .  .Continue work on .Advancing stabilization in quadruped and advance with  stability ball..    ASSESSMENT   Flare of right L4 radicular symptoms following epidural last week resolving. Pain into anterior thigh L4 to knee and sharper into right groin. and no longer to foot. Pelvis  level  post treatment. Has difficulty in higher level quadruped or tall kneel tasks or wall plank with leg extension to stabilize lumbar segments..Patient with improving  tolerance for work up to 5  hours on her feet.  She is scheduled for a second epidural injection.  Pain after treatment (patient reported, 0-10 scale): 0/10 Low back and right  Leg pain 1/10    History of systemic hypermobility and anticipate increased time needed for core/scapula thoracic and pelvic girdle stabilization for safe reach , lift, push / pull and squat.  Can benefit from further strengthening and functional training push/pull squat / lunge and safe lifts..  Result of above outlined education: Verbalizes understanding, Demonstrates understanding and Needs reinforcement    THERAPY DAILY BILLING   Insurance: -[employer] 2. N/A    Charges entered in time entry system       [facility]

## 2021-07-24 NOTE — DISCHARGE NOTE NURSING/CASE MANAGEMENT/SOCIAL WORK - PATIENT PORTAL LINK FT
You can access the FollowMyHealth Patient Portal offered by [facility] by registering at the following website: [email or web address]. By joining [facility]’s FollowMyHealth portal, you will also be able to view your health information using other applications (apps) compatible with our system.

## 2021-07-24 NOTE — DISCHARGE NOTE NURSING/CASE MANAGEMENT/SOCIAL WORK - NSSCNAMETXT_GEN_ALL_CORE
[facility] (formerly [facility])

## 2021-07-24 NOTE — PROGRESS NOTE ADULT - SUBJECTIVE AND OBJECTIVE BOX
GASTROENTEROLOGY PROGRESS NOTE  Patient seen and examined at bedside. Feeling well. No new complaints    PERTINENT REVIEW OF SYSTEMS:  As noted above    Allergies    penicillin (Other (Mild))  sulfa drugs (Unknown)    Intolerances      MEDICATIONS:  MEDICATIONS  (STANDING):  allopurinol 50 milliGRAM(s) Oral daily  aMIOdarone    Tablet 200 milliGRAM(s) Oral daily  atorvastatin 40 milliGRAM(s) Oral at bedtime  cefTRIAXone   IVPB 1000 milliGRAM(s) IV Intermittent every 24 hours  dextrose 40% Gel 15 Gram(s) Oral once  dextrose 5%. 1000 milliLiter(s) (50 mL/Hr) IV Continuous <Continuous>  dextrose 5%. 1000 milliLiter(s) (100 mL/Hr) IV Continuous <Continuous>  dextrose 50% Injectable 25 Gram(s) IV Push once  dextrose 50% Injectable 12.5 Gram(s) IV Push once  dextrose 50% Injectable 25 Gram(s) IV Push once  enoxaparin Injectable 40 milliGRAM(s) SubCutaneous daily  furosemide    Tablet 20 milliGRAM(s) Oral daily  glucagon  Injectable 1 milliGRAM(s) IntraMuscular once  insulin lispro (ADMELOG) corrective regimen sliding scale   SubCutaneous Before meals and at bedtime  metoprolol succinate ER 25 milliGRAM(s) Oral daily  metroNIDAZOLE    Tablet 500 milliGRAM(s) Oral every 8 hours  pantoprazole    Tablet 40 milliGRAM(s) Oral before breakfast    MEDICATIONS  (PRN):    Vital Signs Last 24 Hrs  T(C): 36.8 (24 Jul 2021 10:48), Max: 37.1 (23 Jul 2021 13:05)  T(F): 98.2 (24 Jul 2021 10:48), Max: 98.8 (23 Jul 2021 13:05)  HR: 62 (24 Jul 2021 10:48) (55 - 62)  BP: 106/60 (24 Jul 2021 10:48) (101/37 - 135/54)  BP(mean): --  RR: 18 (24 Jul 2021 10:48) (18 - 18)  SpO2: 94% (24 Jul 2021 10:48) (92% - 96%)    07-23 @ 07:01  -  07-24 @ 07:00  --------------------------------------------------------  IN: 0 mL / OUT: 950 mL / NET: -950 mL      PHYSICAL EXAM:    General: Well developed; in no acute distress  HEENT: MMM, conjunctiva and sclera clear  Gastrointestinal: Soft non-tender non-distended; No rebound or guarding  Skin: Warm and dry. No obvious rash    LABS:                        8.5    5.06  )-----------( 168      ( 24 Jul 2021 06:46 )             28.4     07-24    139  |  104  |  41<H>  ----------------------------<  157<H>  4.8   |  26  |  1.70<H>    Ca    8.5      24 Jul 2021 06:46  Mg     2.1     07-23                        Culture - Blood (collected 22 Jul 2021 08:47)  Source: .Blood Blood-Peripheral  Preliminary Report (24 Jul 2021 09:00):    No growth at 2 days.    Culture - Blood (collected 22 Jul 2021 08:47)  Source: .Blood Blood-Peripheral  Preliminary Report (24 Jul 2021 09:00):    No growth at 2 days.    Culture - Urine (collected 22 Jul 2021 00:37)  Source: Clean Catch Clean Catch (Midstream)  Final Report (23 Jul 2021 08:36):    Insignificant amount of mixed growth.      RADIOLOGY & ADDITIONAL STUDIES:  Reviewed

## 2021-07-24 NOTE — PROGRESS NOTE ADULT - ASSESSMENT
87M  with a PMH of HTN, HLD, DM, CHF, Afib s/p cardioversion, CKD, GERD, and small bowel bleeding s/p enteroscopy p/w fatigue and dyspnea found to have pneumona.  Given recent procedure on left lateral decubitus, finding is consistent with aspiration pneumonia.  Afebrile overnight and hgb stable.    #Pneumonia  Given recent procedure and timing of dyspnea, finding and presentation is consistent with aspiration pneumonia.    -Trend fever curve and white count  -Continue management per primary team    #Anemia  Patient with recent enteroscopy for small bowel bleeding found to have abnormal jejunal mucosa s/p biopsy.  Hgb stable without further BM since procedure.  -Followup pathology  -GI will continue to follow    Please schedule patient for follow-up on the fourth floor of [address] with [phone]    Recommendations d/w primary team  Case d/w svc attg    
87M  with a PMH of HTN, HLD, DM, CHF, Afib s/p cardioversion, CKD, GERD, and small bowel bleeding s/p enteroscopy p/w fatigue and dyspnea found to have pneumona.  Given recent procedure on left lateral decubitus, finding is consistent with aspiration pneumonia.  Afebrile overnight and hgb stable.    #Pneumonia  Given recent procedure and timing of dyspnea, finding and presentation is consistent with aspiration pneumonia.    -Trend fever curve and white count  -Continue management per primary team    #Anemia  Patient with recent enteroscopy for small bowel bleeding found to have abnormal jejunal mucosa s/p biopsy.  Hgb stable without further BM since procedure.  -Followup pathology   upon discharge please schedule patient for follow-up on the fourth floor of [address] with [phone]    Recommendations d/w primary team  Case d/w c attg    
86 y/o M w/
87M PMH HTN, HLD, CHF, Afib (s/p cardioversion), T2D, CKD3 (BL Cr 1.5-1.7), gout, GERD, GI AVMs (hx bleeds), presenting with SOB, SAENZ, fevers, chills x1 day, admitted for acute respiratory failure 2/2 suspected PNA
87M PMH HTN, HLD, CHF, Afib (s/p cardioversion), T2D, CKD3 (BL Cr 1.5-1.7), gout, GERD, GI AVMs (hx bleeds), presenting with SOB, SAENZ, fevers, chills x1 day, admitted for acute respiratory failure 2/2 suspected PNA

## 2021-07-25 LAB — SURGICAL PATHOLOGY STUDY: SIGNIFICANT CHANGE UP

## 2021-07-25 RX ORDER — CEFPODOXIME PROXETIL 100 MG
1 TABLET ORAL
Qty: 8 | Refills: 0
Start: 2021-07-25 | End: 2021-07-28

## 2021-07-26 ENCOUNTER — NON-APPOINTMENT (OUTPATIENT)
Age: 86
End: 2021-07-26

## 2021-07-26 DIAGNOSIS — J18.9 PNEUMONIA, UNSPECIFIED ORGANISM: ICD-10-CM

## 2021-07-26 RX ORDER — QUINAPRIL HYDROCHLORIDE 10 MG/1
10 TABLET, FILM COATED ORAL DAILY
Qty: 30 | Refills: 0 | Status: DISCONTINUED | COMMUNITY
Start: 2020-10-18 | End: 2021-07-26

## 2021-07-26 RX ORDER — APIXABAN 5 MG/1
5 TABLET, FILM COATED ORAL
Qty: 60 | Refills: 8 | Status: DISCONTINUED | COMMUNITY
End: 2021-07-26

## 2021-07-27 ENCOUNTER — APPOINTMENT (OUTPATIENT)
Dept: CARE COORDINATION | Facility: HOME HEALTH | Age: 86
End: 2021-07-27
Payer: MEDICARE

## 2021-07-27 DIAGNOSIS — Z87.19 PERSONAL HISTORY OF OTHER DISEASES OF THE DIGESTIVE SYSTEM: ICD-10-CM

## 2021-07-27 DIAGNOSIS — Z78.9 OTHER SPECIFIED HEALTH STATUS: ICD-10-CM

## 2021-07-27 DIAGNOSIS — Z79.4 OTHER SPECIFIED DIABETES MELLITUS WITH OTHER DIABETIC KIDNEY COMPLICATION: ICD-10-CM

## 2021-07-27 DIAGNOSIS — R80.9 OTHER SPECIFIED DIABETES MELLITUS WITH OTHER DIABETIC KIDNEY COMPLICATION: ICD-10-CM

## 2021-07-27 DIAGNOSIS — Z86.79 PERSONAL HISTORY OF OTHER DISEASES OF THE CIRCULATORY SYSTEM: ICD-10-CM

## 2021-07-27 DIAGNOSIS — Z86.39 PERSONAL HISTORY OF OTHER ENDOCRINE, NUTRITIONAL AND METABOLIC DISEASE: ICD-10-CM

## 2021-07-27 DIAGNOSIS — E13.29 OTHER SPECIFIED DIABETES MELLITUS WITH OTHER DIABETIC KIDNEY COMPLICATION: ICD-10-CM

## 2021-07-27 DIAGNOSIS — J69.0 PNEUMONITIS DUE TO INHALATION OF FOOD AND VOMIT: ICD-10-CM

## 2021-07-27 DIAGNOSIS — Z98.890 OTHER SPECIFIED POSTPROCEDURAL STATES: ICD-10-CM

## 2021-07-27 PROBLEM — K21.9 GASTRO-ESOPHAGEAL REFLUX DISEASE WITHOUT ESOPHAGITIS: Chronic | Status: ACTIVE | Noted: 2021-07-22

## 2021-07-27 PROBLEM — Q27.30 ARTERIOVENOUS MALFORMATION, SITE UNSPECIFIED: Chronic | Status: ACTIVE | Noted: 2021-07-22

## 2021-07-27 PROBLEM — N18.9 CHRONIC KIDNEY DISEASE, UNSPECIFIED: Chronic | Status: ACTIVE | Noted: 2021-07-22

## 2021-07-27 PROBLEM — I50.9 HEART FAILURE, UNSPECIFIED: Chronic | Status: ACTIVE | Noted: 2021-07-22

## 2021-07-27 PROBLEM — E11.9 TYPE 2 DIABETES MELLITUS WITHOUT COMPLICATIONS: Chronic | Status: ACTIVE | Noted: 2021-07-22

## 2021-07-27 PROCEDURE — 99348 HOME/RES VST EST LOW MDM 30: CPT | Mod: 95

## 2021-07-28 ENCOUNTER — TRANSCRIPTION ENCOUNTER (OUTPATIENT)
Age: 86
End: 2021-07-28

## 2021-07-29 DIAGNOSIS — R09.02 HYPOXEMIA: ICD-10-CM

## 2021-07-29 DIAGNOSIS — I50.32 CHRONIC DIASTOLIC (CONGESTIVE) HEART FAILURE: ICD-10-CM

## 2021-07-29 DIAGNOSIS — J96.01 ACUTE RESPIRATORY FAILURE WITH HYPOXIA: ICD-10-CM

## 2021-07-29 DIAGNOSIS — D50.0 IRON DEFICIENCY ANEMIA SECONDARY TO BLOOD LOSS (CHRONIC): ICD-10-CM

## 2021-07-29 DIAGNOSIS — N18.30 CHRONIC KIDNEY DISEASE, STAGE 3 UNSPECIFIED: ICD-10-CM

## 2021-07-29 DIAGNOSIS — J69.0 PNEUMONITIS DUE TO INHALATION OF FOOD AND VOMIT: ICD-10-CM

## 2021-07-29 DIAGNOSIS — I44.0 ATRIOVENTRICULAR BLOCK, FIRST DEGREE: ICD-10-CM

## 2021-07-29 DIAGNOSIS — I45.10 UNSPECIFIED RIGHT BUNDLE-BRANCH BLOCK: ICD-10-CM

## 2021-07-29 DIAGNOSIS — K55.20 ANGIODYSPLASIA OF COLON WITHOUT HEMORRHAGE: ICD-10-CM

## 2021-07-29 DIAGNOSIS — K28.9 GASTROJEJUNAL ULCER, UNSPECIFIED AS ACUTE OR CHRONIC, WITHOUT HEMORRHAGE OR PERFORATION: ICD-10-CM

## 2021-07-29 DIAGNOSIS — E11.22 TYPE 2 DIABETES MELLITUS WITH DIABETIC CHRONIC KIDNEY DISEASE: ICD-10-CM

## 2021-07-29 DIAGNOSIS — I13.0 HYPERTENSIVE HEART AND CHRONIC KIDNEY DISEASE WITH HEART FAILURE AND STAGE 1 THROUGH STAGE 4 CHRONIC KIDNEY DISEASE, OR UNSPECIFIED CHRONIC KIDNEY DISEASE: ICD-10-CM

## 2021-07-29 DIAGNOSIS — I27.20 PULMONARY HYPERTENSION, UNSPECIFIED: ICD-10-CM

## 2021-07-29 DIAGNOSIS — E78.5 HYPERLIPIDEMIA, UNSPECIFIED: ICD-10-CM

## 2021-07-29 DIAGNOSIS — K21.9 GASTRO-ESOPHAGEAL REFLUX DISEASE WITHOUT ESOPHAGITIS: ICD-10-CM

## 2021-07-29 DIAGNOSIS — Z20.828 CONTACT WITH AND (SUSPECTED) EXPOSURE TO OTHER VIRAL COMMUNICABLE DISEASES: ICD-10-CM

## 2021-07-29 DIAGNOSIS — I48.20 CHRONIC ATRIAL FIBRILLATION, UNSPECIFIED: ICD-10-CM

## 2021-07-29 DIAGNOSIS — M1A.00X0 IDIOPATHIC CHRONIC GOUT, UNSPECIFIED SITE, WITHOUT TOPHUS (TOPHI): ICD-10-CM

## 2021-07-29 DIAGNOSIS — Z88.2 ALLERGY STATUS TO SULFONAMIDES: ICD-10-CM

## 2021-07-29 DIAGNOSIS — Z88.0 ALLERGY STATUS TO PENICILLIN: ICD-10-CM

## 2021-07-29 DIAGNOSIS — J95.89 OTHER POSTPROCEDURAL COMPLICATIONS AND DISORDERS OF RESPIRATORY SYSTEM, NOT ELSEWHERE CLASSIFIED: ICD-10-CM

## 2021-07-30 PROBLEM — Z86.39 HISTORY OF HYPERLIPIDEMIA: Status: RESOLVED | Noted: 2021-07-30 | Resolved: 2021-07-30

## 2021-07-30 PROBLEM — Z86.79 HISTORY OF HYPERTENSION: Status: RESOLVED | Noted: 2021-07-30 | Resolved: 2021-07-30

## 2021-07-30 PROBLEM — Z86.79 HISTORY OF CONGESTIVE HEART FAILURE: Status: RESOLVED | Noted: 2021-07-30 | Resolved: 2021-07-30

## 2021-07-30 PROBLEM — Z98.890 HISTORY OF CARDIOVERSION: Status: RESOLVED | Noted: 2021-07-30 | Resolved: 2021-07-30

## 2021-07-30 PROBLEM — Z86.39 HISTORY OF DIABETES MELLITUS: Status: RESOLVED | Noted: 2021-07-30 | Resolved: 2021-07-30

## 2021-07-30 PROBLEM — J69.0 ASPIRATION PNEUMONIA: Status: ACTIVE | Noted: 2021-07-30

## 2021-07-30 PROBLEM — Z86.79 HISTORY OF PULMONARY HYPERTENSION: Status: RESOLVED | Noted: 2021-07-30 | Resolved: 2021-07-30

## 2021-07-30 PROBLEM — Z87.19 HISTORY OF GASTROESOPHAGEAL REFLUX (GERD): Status: RESOLVED | Noted: 2021-07-30 | Resolved: 2021-07-30

## 2021-07-30 PROBLEM — Z86.79 HISTORY OF ATRIAL FIBRILLATION: Status: RESOLVED | Noted: 2021-07-30 | Resolved: 2021-07-30

## 2021-07-30 PROBLEM — E13.29 DIABETES MELLITUS OF OTHER TYPE WITH MICROALBUMINURIA, WITH LONG-TERM CURRENT USE OF INSULIN: Status: RESOLVED | Noted: 2021-07-30 | Resolved: 2021-07-30

## 2021-07-30 PROBLEM — Z78.9 DOES NOT USE ILLICIT DRUGS: Status: ACTIVE | Noted: 2021-07-30

## 2021-07-30 PROBLEM — Z87.19 HISTORY OF GASTROINTESTINAL HEMORRHAGE: Status: RESOLVED | Noted: 2021-07-30 | Resolved: 2021-07-30

## 2021-07-30 PROBLEM — Z78.9 DENIES ALCOHOL CONSUMPTION: Status: ACTIVE | Noted: 2021-07-30

## 2021-07-30 RX ORDER — PANTOPRAZOLE 20 MG/1
20 TABLET, DELAYED RELEASE ORAL
Refills: 0 | Status: DISCONTINUED | COMMUNITY
End: 2021-07-30

## 2021-07-30 NOTE — PLAN
[FreeTextEntry1] : CV and pulmonary status stable\par Pulse oximeter delivered to pt's home with + teach back\par Adhere to all medications\par Increase activity as tolerated and maintain optimal activity levels. \par Continue coughing and deep breathing exercises \par Receive routine pneumococcal and influenza vaccinations.\par Maintain proper nutrition and adequate hydration.\par Notify NP for worsening symptoms including fever, chills, SOB, CP, increased cough and secretions.\par Follow up with MD within 7 days of discharge.\par Instructed the patient to call TCM Team or CCC with any questions or concerns.\par \par \par

## 2021-07-30 NOTE — HISTORY OF PRESENT ILLNESS
[de-identified] : This patient is Enrolled in the STAR Program through [employer]\par Copied As per [facility] Discharge Summary\par \par "87 year old male with history of HTN, HLD, CHF, AFib, s/p DCCV, DM Type 2, CKD, gout, GERD, GI Bleed presented to [facility] with c/o SOB, SAENZ, fevers, chills x 1 day admitted for acute respiratory failure secondary to suspected aspiration PNA."  The patient was discharged home in stable condition with home care services and follow up care.   Pt  able to speak with complete sentences and no audible wheeze noted.\par \par \par During the Home Visit, the patient was in no acute distress accompanied by his spouse.  Asked to deliver a pulse oximeter by Home Care RN.  Instructed the pt and spouse on how to use the pulse oximeter, maintain a log and when to call medical provider if prolonged O2 sat < 90% with deep breathing and if worsening SOB.  Pt and spouse verbalized understanding. \par \par Pulse ox at rest 96% after exertion 95-97%.   HR 65

## 2021-07-30 NOTE — ASSESSMENT
[FreeTextEntry1] : "87 year old male with history of HTN, HLD, CHF, AFib, s/p DCCV, DM Type 2, CKD, gout, GERD, GI Bleed presented to [facility] with c/o SOB, SAENZ, fevers, chills x 1 day admitted for acute respiratory failure secondary to suspected aspiration PNA."  The patient was discharged home in stable condition with home care services and follow up care.  \par \par PNA\par HTN\par HLD\par AFib S/P DCCV (Off AC)\par DM Type 2\par CKD\par Gout\par GIB

## 2021-07-30 NOTE — PHYSICAL EXAM
[No Acute Distress] : no acute distress [Well Nourished] : well nourished [Well Developed] : well developed [Normal Sclera/Conjunctiva] : normal sclera/conjunctiva [Normal Outer Ear/Nose] : the outer ears and nose were normal in appearance [No JVD] : no jugular venous distention [No Respiratory Distress] : no respiratory distress  [No Accessory Muscle Use] : no accessory muscle use [No Edema] : there was no peripheral edema [Non Tender] : non-tender [No Focal Deficits] : no focal deficits [Normal Affect] : the affect was normal [Normal Insight/Judgement] : insight and judgment were intact

## 2021-08-13 NOTE — ED PROVIDER NOTE - CADM POA PRESS ULCER
[Image(s) Captured] : image(s) captured and filed [Unable to Cooperate with Mirror] : patient unable to cooperate with mirror [Gag Reflex] : gag reflex preventing mirror examination [Hoarseness] : hoarseness not clearly evaluated by indirect laryngoscopy [Topical Lidocaine] : topical lidocaine [Oxymetazoline HCl] : oxymetazoline HCl [Flexible Endoscope] : examined with the flexible endoscope [Serial Number: ___] : Serial Number: [unfilled] [FreeTextEntry3] : \par ..........................................[facility]\par ........................................ULTRASOUND-GUIDED THYROID FINE NEEDLE ASPIRATION BIOPSY REPORT\par \par NAME: SHELLY ROJAS.........MR# [id number] .........: 1952 .........DATE: 2021 .........TIME: 2:28 PM.\par \par HISTORY/ INDICATIONS: 68- year-old female with a solid right mid lobe hypoechoic nodule and adjacent calcifications.\par \par EXAM: Real-time high-resolution ultrasound imaging of the thyroid gland was performed in the longitudinal and transverse planes with color power Doppler supplementation and image capture.\par \par FINDINGS: A right mid lobe nodule measuring 2.50 x 1.35 x 1.85 cm (longitudinal x AP x transverse) was identified and targeted for USG-FNA.  The nodule had the following ultrasonographic characteristics: solid, hypoechoic, heterogeneous, smoothly marginated, few punctate echogenic foci, grade 3 vascularity on color Doppler, and wider-than-tall, TI-RADS 4.\par \par PROCEDURE: A time out took place and documented for correct patient identifiers, site and side of procedure.  After obtaining informed consent with discussion of risks, benefits and alternatives, the patient was positioned semi-supine, the skin was prepped with alcohol and 0.5 cc of 1% Lidocaine with 1:100,000 epinephrine was injected for local anesthesia. A #25-gauge needle was then passed along the perpendicular plane of the transducer, positioned within the nodule and confirmed with ultrasonography.  Multiple aspirations were made within the nodule with four separate needle punctures, four passes each.  Aspirates were smeared on glass slides and also directly placed into both formalin and cytolyt solutions for cytologic analysis, immunohistochemistry, and possible molecular genomic diagnostics.  The patient tolerated the procedure well without complications and was discharged with signed post-procedure instructions indicating the importance of following up on all results. \par \par ASSESSMENT & PLAN: Successful USG-FNA of a right mid lobe solid hypoechoic nodule was well tolerated without complications. The patient will be contacted to review the cytologic findings as soon as available for further treatment planning.  A discussion took place with the patient who accepted the responsibility to call the office to review the cytology results if no communication occurs within two weeks. Follow-up imaging in 1 year is recommended if the cytology is reported as benign, [place] Category II.\par \par Electronically signed by COOKIE SANABRIA M.D., FACS on 2021, 2:35 PM\par \par [facility]: [address], [address],  [place] [address]\par [phone] (voice), [phone] (fax) [de-identified] : The nasal septum is minimally deviated to the left with a spur. No blood seen. There are no masses or polyps and the nasal mucosa and secretions are normal. The choanae and posterior nasopharynx are normal without masses or drainage. The Eustachian tube orifices appear patent. The pharynx, including the posterior and lateral pharyngeal walls, the vallecula and base of tongue are normal without ulcerations, lesions or masses. The hypopharynx including the pyriform sinuses open well without pooling of secretions, mucosal lesions or masses. The supraglottic larynx including the epiglottis, petiole, arytenoids, glossoepiglottic, aryepiglottic and pharyngoepiglottic folds are normal without mucosal lesions, ulcerations or masses. The glottis reveals normal false vocal folds. The true vocal folds are glistening white, tense and of equal length, without paralysis, having symmetric mobility on adduction and abduction. There are no mucosal lesions, nodules, cysts, erythroplasia or leukoplakia. The posterior cricoid area has healthy pink mucosa in the interarytenoid area and esophageal inlet. There is minimal thickening/edema of the interarytenoid mucosa suggestive of posterior laryngitis from laryngopharyngeal acid reflux disease. The trachea is clear without narrowing in the immediate subglottic region, without deviation or lesions.  No

## 2021-11-01 NOTE — PATIENT PROFILE ADULT - OVER THE PAST TWO WEEKS HAVE YOU FELT DOWN, DEPRESSED OR HOPELESS?
-- DO NOT REPLY / DO NOT REPLY ALL --  -- Message is from the [facility]--    General Patient Message      Reason for Call: Patient is calling to inform the  that her  (Hill Dubon) will be coming in to  her FMLA paperwork.     Caller Information       Type Contact Phone    11/01/2021 12:13 PM CDT Phone (Incoming) Ghada Dubon (Self) [phone] (H)          Alternative phone number: none    Turnaround time given to caller:   \"This message will be sent to [state Provider's name]. The clinical team will fulfill your request as soon as they review your message.\"     no

## 2021-11-24 ENCOUNTER — APPOINTMENT (OUTPATIENT)
Dept: CARDIOLOGY | Facility: CLINIC | Age: 86
End: 2021-11-24

## 2021-12-08 ENCOUNTER — APPOINTMENT (OUTPATIENT)
Dept: CARDIOLOGY | Facility: CLINIC | Age: 86
End: 2021-12-08
Payer: MEDICARE

## 2021-12-08 DIAGNOSIS — I50.30 UNSPECIFIED DIASTOLIC (CONGESTIVE) HEART FAILURE: ICD-10-CM

## 2021-12-08 PROCEDURE — 99212 OFFICE O/P EST SF 10 MIN: CPT

## 2022-05-26 ENCOUNTER — NON-APPOINTMENT (OUTPATIENT)
Age: 87
End: 2022-05-26

## 2022-05-26 ENCOUNTER — APPOINTMENT (OUTPATIENT)
Dept: OPHTHALMOLOGY | Facility: CLINIC | Age: 87
End: 2022-05-26
Payer: MEDICARE

## 2022-05-26 PROCEDURE — 92014 COMPRE OPH EXAM EST PT 1/>: CPT

## 2022-05-26 PROCEDURE — 92015 DETERMINE REFRACTIVE STATE: CPT

## 2022-05-27 ENCOUNTER — EMERGENCY (EMERGENCY)
Facility: HOSPITAL | Age: 87
LOS: 1 days | Discharge: HOME | End: 2022-05-27
Attending: EMERGENCY MEDICINE
Payer: MEDICARE

## 2022-05-27 VITALS
WEIGHT: 190.04 LBS | OXYGEN SATURATION: 95 % | HEART RATE: 52 BPM | RESPIRATION RATE: 20 BRPM | TEMPERATURE: 98 F | SYSTOLIC BLOOD PRESSURE: 154 MMHG | DIASTOLIC BLOOD PRESSURE: 63 MMHG | HEIGHT: 69 IN

## 2022-05-27 VITALS
TEMPERATURE: 98 F | OXYGEN SATURATION: 95 % | DIASTOLIC BLOOD PRESSURE: 71 MMHG | SYSTOLIC BLOOD PRESSURE: 158 MMHG | RESPIRATION RATE: 18 BRPM | HEART RATE: 51 BPM

## 2022-05-27 DIAGNOSIS — Z90.49 ACQUIRED ABSENCE OF OTHER SPECIFIED PARTS OF DIGESTIVE TRACT: Chronic | ICD-10-CM

## 2022-05-27 LAB
ALBUMIN SERPL ELPH-MCNC: 3.5 G/DL — SIGNIFICANT CHANGE UP (ref 3.3–5)
ALP SERPL-CCNC: 70 U/L — SIGNIFICANT CHANGE UP (ref 40–120)
ALT FLD-CCNC: 30 U/L — SIGNIFICANT CHANGE UP (ref 10–45)
ANION GAP SERPL CALC-SCNC: 12 MMOL/L — SIGNIFICANT CHANGE UP (ref 5–17)
AST SERPL-CCNC: 21 U/L — SIGNIFICANT CHANGE UP (ref 10–40)
BASOPHILS # BLD AUTO: 0.02 K/UL — SIGNIFICANT CHANGE UP (ref 0–0.2)
BASOPHILS NFR BLD AUTO: 0.3 % — SIGNIFICANT CHANGE UP (ref 0–2)
BILIRUB SERPL-MCNC: 0.8 MG/DL — SIGNIFICANT CHANGE UP (ref 0.2–1.2)
BUN SERPL-MCNC: 41 MG/DL — HIGH (ref 7–23)
CALCIUM SERPL-MCNC: 8.6 MG/DL — SIGNIFICANT CHANGE UP (ref 8.4–10.5)
CHLORIDE SERPL-SCNC: 108 MMOL/L — SIGNIFICANT CHANGE UP (ref 96–108)
CO2 SERPL-SCNC: 21 MMOL/L — LOW (ref 22–31)
CREAT SERPL-MCNC: 1.43 MG/DL — HIGH (ref 0.5–1.3)
EGFR: 47 ML/MIN/1.73M2 — LOW
EOSINOPHIL # BLD AUTO: 0.02 K/UL — SIGNIFICANT CHANGE UP (ref 0–0.5)
EOSINOPHIL NFR BLD AUTO: 0.3 % — SIGNIFICANT CHANGE UP (ref 0–6)
GLUCOSE SERPL-MCNC: 156 MG/DL — HIGH (ref 70–99)
HCT VFR BLD CALC: 43.5 % — SIGNIFICANT CHANGE UP (ref 39–50)
HGB BLD-MCNC: 13.7 G/DL — SIGNIFICANT CHANGE UP (ref 13–17)
IMM GRANULOCYTES NFR BLD AUTO: 0.5 % — SIGNIFICANT CHANGE UP (ref 0–1.5)
LYMPHOCYTES # BLD AUTO: 0.99 K/UL — LOW (ref 1–3.3)
LYMPHOCYTES # BLD AUTO: 15.3 % — SIGNIFICANT CHANGE UP (ref 13–44)
MCHC RBC-ENTMCNC: 31.4 PG — SIGNIFICANT CHANGE UP (ref 27–34)
MCHC RBC-ENTMCNC: 31.5 GM/DL — LOW (ref 32–36)
MCV RBC AUTO: 99.5 FL — SIGNIFICANT CHANGE UP (ref 80–100)
MONOCYTES # BLD AUTO: 0.94 K/UL — HIGH (ref 0–0.9)
MONOCYTES NFR BLD AUTO: 14.5 % — HIGH (ref 2–14)
NEUTROPHILS # BLD AUTO: 4.47 K/UL — SIGNIFICANT CHANGE UP (ref 1.8–7.4)
NEUTROPHILS NFR BLD AUTO: 69.1 % — SIGNIFICANT CHANGE UP (ref 43–77)
NRBC # BLD: 0 /100 WBCS — SIGNIFICANT CHANGE UP (ref 0–0)
PLATELET # BLD AUTO: 156 K/UL — SIGNIFICANT CHANGE UP (ref 150–400)
POTASSIUM SERPL-MCNC: 4.6 MMOL/L — SIGNIFICANT CHANGE UP (ref 3.5–5.3)
POTASSIUM SERPL-SCNC: 4.6 MMOL/L — SIGNIFICANT CHANGE UP (ref 3.5–5.3)
PROT SERPL-MCNC: 7 G/DL — SIGNIFICANT CHANGE UP (ref 6–8.3)
RBC # BLD: 4.37 M/UL — SIGNIFICANT CHANGE UP (ref 4.2–5.8)
RBC # FLD: 16.9 % — HIGH (ref 10.3–14.5)
SODIUM SERPL-SCNC: 141 MMOL/L — SIGNIFICANT CHANGE UP (ref 135–145)
WBC # BLD: 6.47 K/UL — SIGNIFICANT CHANGE UP (ref 3.8–10.5)
WBC # FLD AUTO: 6.47 K/UL — SIGNIFICANT CHANGE UP (ref 3.8–10.5)

## 2022-05-27 PROCEDURE — 85025 COMPLETE CBC W/AUTO DIFF WBC: CPT

## 2022-05-27 PROCEDURE — 73590 X-RAY EXAM OF LOWER LEG: CPT

## 2022-05-27 PROCEDURE — 99283 EMERGENCY DEPT VISIT LOW MDM: CPT | Mod: 25

## 2022-05-27 PROCEDURE — 99284 EMERGENCY DEPT VISIT MOD MDM: CPT

## 2022-05-27 PROCEDURE — 80053 COMPREHEN METABOLIC PANEL: CPT

## 2022-05-27 PROCEDURE — 36415 COLL VENOUS BLD VENIPUNCTURE: CPT

## 2022-05-27 PROCEDURE — 90715 TDAP VACCINE 7 YRS/> IM: CPT

## 2022-05-27 PROCEDURE — 73590 X-RAY EXAM OF LOWER LEG: CPT | Mod: 26,RT

## 2022-05-27 PROCEDURE — 90471 IMMUNIZATION ADMIN: CPT

## 2022-05-27 RX ORDER — TETANUS TOXOID, REDUCED DIPHTHERIA TOXOID AND ACELLULAR PERTUSSIS VACCINE, ADSORBED 5; 2.5; 8; 8; 2.5 [IU]/.5ML; [IU]/.5ML; UG/.5ML; UG/.5ML; UG/.5ML
0.5 SUSPENSION INTRAMUSCULAR ONCE
Refills: 0 | Status: COMPLETED | OUTPATIENT
Start: 2022-05-27 | End: 2022-05-27

## 2022-05-27 RX ADMIN — TETANUS TOXOID, REDUCED DIPHTHERIA TOXOID AND ACELLULAR PERTUSSIS VACCINE, ADSORBED 0.5 MILLILITER(S): 5; 2.5; 8; 8; 2.5 SUSPENSION INTRAMUSCULAR at 11:42

## 2022-05-27 RX ADMIN — Medication 450 MILLIGRAM(S): at 11:42

## 2022-05-27 NOTE — ED PROVIDER NOTE - OBJECTIVE STATEMENT
87M PMH DM CHF CC right shin pain. scraped right shin 1 week ago while getting onto a truck, against running board, pt notes it was an old / ronaldo truck. PT notes the infection has not been getting better, has been keeping the area clean. Denies any F or difficulty w/ ambulating. Current pain is 1-2/10.     Allergy to penicillin as kid, not sure of rxn   Denies any CP SOB N/V

## 2022-05-27 NOTE — ED PROVIDER NOTE - PATIENT PORTAL LINK FT
You can access the FollowMyHealth Patient Portal offered by [facility] by registering at the following website: [email or web address]. By joining [facility]’s FollowMyHealth portal, you will also be able to view your health information using other applications (apps) compatible with our system.

## 2022-05-27 NOTE — ED PROVIDER NOTE - CLINICAL SUMMARY MEDICAL DECISION MAKING FREE TEXT BOX
87M w/ DM CHF CC right shin scrape against truck. Given hx and physical w/ comorbidities, concern for cellulitis will get basic labs and x ray to r/o free air, w/ wound mngmt

## 2022-05-27 NOTE — ED PROVIDER NOTE - NSFOLLOWUPINSTRUCTIONS_ED_ALL_ED_FT
Today you were seen in the ED for a scape against your leg     It was found that you have    Please follow up with your PCP, and wound mangement, information for such can be found below.   Given the size, you also need to follow up with plastics, as the skin may not heal properly, information can also be found below.     Keep the area dry, take ciprofloxacin, 450mg three times a day. Keep the area covered while working outside and clean / dry.     Contact a health care provider if:    •Your symptoms do not improve within 1–2 days of starting treatment  •You develop new symptoms.  •You have a fever.  •You feel generally sick (malaise) with muscle aches and pains.  Get help right away if:  •Your symptoms get worse.  •You develop vomiting or diarrhea that does not go away.  •Your red area gets larger or turns dark in color.  •You notice red streaks coming from the infected area Today you were seen in the ED for a scape against your leg     It was found that you have cellulitis      Please return to the ED in 2-3 days to have the wound checked, if not please go to your PCP to have a wound check.     Please follow up with your PCP, and wound management, information for such can be found below.   Given the size, you also need to follow up with plastics, as the skin may not heal properly, information can also be found below.     Keep the area dry, take ciprofloxacin, 300mg three times a day. Keep the area covered while working outside and clean / dry.     Contact a health care provider if:  •Your symptoms do not improve within 1–2 days of starting treatment  •You develop new symptoms.  •You have a fever.  •You feel generally sick (malaise) with muscle aches and pains.  Get help right away if:  •Your symptoms get worse.  •You develop vomiting or diarrhea that does not go away.  •Your red area gets larger or turns dark in color.  •You notice red streaks coming from the infected area Today you were seen in the ED for a scape against your leg     It was found that you have cellulitis      Please return to the ED in 2 days to have the wound checked, if not please go to your PCP to have a wound check.     Please follow up with your PCP, and wound management, information for such can be found below.   Given the size, you also need to follow up with plastics, as the skin may not heal properly, information can also be found below.     Keep the area dry, take ciprofloxacin, 300mg three times a day. Keep the area covered while working outside and clean / dry.     Contact a health care provider if:  •Your symptoms do not improve within 1–2 days of starting treatment  •You develop new symptoms.  •You have a fever.  •You feel generally sick (malaise) with muscle aches and pains.  Get help right away if:  •Your symptoms get worse.  •You develop vomiting or diarrhea that does not go away.  •Your red area gets larger or turns dark in color.  •You notice red streaks coming from the infected area

## 2022-05-27 NOTE — ED ADULT NURSE NOTE - OBJECTIVE STATEMENT
88yo M aaox4 H/o HF, DM2, presents ambulatory from home to ED c/o R shin wound, as per pt 1 week ago while getting in  the car , causing a scratching  on the R shin, his wife is a nurse using bacitracin "not getting better" decide to came to Ed for evaluation, On examination R shin is swelling, redness, painful, +pedal pulses, +sensation, +ROM, <2 cap refill Pt denies CP, SOB, HA, vision changes, n/v/d, fevers chills, abdominal pain. Safety and comfort measures initiated- bed placed in lowest position and side rails raised.

## 2022-05-27 NOTE — ED PROVIDER NOTE - NSFOLLOWUPCLINICS_GEN_ALL_ED_FT
[facility]  Wound Care  [address] [place] [address]  Phone: [phone]  Fax: [phone]  Follow Up Time: 1-3 Days    [facility]  Wound Care  [address] [place] [id number]  Phone: [phone]  Fax:   Follow Up Time: 1-3 Days

## 2022-05-27 NOTE — ED PROVIDER NOTE - ATTENDING CONTRIBUTION TO CARE
Private Physician [place]PCP  87y male pmh CHF, DM diet controlled, HLD,former smoker, HTN, Pt comes to ed c/o scrape to rt shin one week ago while getting into car. Now pw persistent wound no fever and chills shortness of breath cp cough nvdc, abd pain shortness of breath. Has been cared for by spouse/Rn, PE WDWN male awake alert normocephalic atraumatic neck supple chest clear anterior & posterior cv no rubs, gallops or murmurs abd soft +bs no mass guarding neruo gcs 15 speech fluent. MSK rt shin abrasoin/superficial avulsion w surrounding erythema and mild ttp warmth. Distal dp pulse 2+ sensation power intact  Nabil Longoria MD, Facep

## 2022-05-27 NOTE — ED PROVIDER NOTE - PHYSICAL EXAMINATION
GENERAL: Awake, alert, NAD  LUNGS: CTAB, no wheezes or crackles   CARDIAC: RRR, no m/r/g  ABDOMEN: Soft, , non tender, non distended, no rebound, no guarding  EXT: neurovasc intact b/l, right erythematous avulsion 3x1 w/ yellow covering, tender to palpation   NEURO: A&Ox3. Moving all extremities.  SKIN: Warm and dry. No rash.  PSYCH: Normal affect.

## 2022-05-27 NOTE — ED ADULT TRIAGE NOTE - INTERNATIONAL TRAVEL
3/8/20 Patient: Mellissa Danielle YOB: 1987 Date of Visit: 3/3/2020 Joe Patton NP 
[address] [facility] [address] [facility] [address] VIA Facsimile: [phone] Dear Joe Patton NP, Thank you for referring Ms. Mellissa Danielle to [address] [place] [address] for evaluation. My notes for this consultation are attached. If you have questions, please do not hesitate to call me. I look forward to following your patient along with you. Sincerely, Guillaume Gregorio MD 
 

No

## 2022-09-08 DIAGNOSIS — I35.0 NONRHEUMATIC AORTIC (VALVE) STENOSIS: ICD-10-CM

## 2022-09-20 ENCOUNTER — FORM ENCOUNTER (OUTPATIENT)
Age: 87
End: 2022-09-20

## 2022-09-21 ENCOUNTER — OUTPATIENT (OUTPATIENT)
Dept: OUTPATIENT SERVICES | Facility: HOSPITAL | Age: 87
LOS: 1 days | End: 2022-09-21
Payer: MEDICARE

## 2022-09-21 ENCOUNTER — TRANSCRIPTION ENCOUNTER (OUTPATIENT)
Age: 87
End: 2022-09-21

## 2022-09-21 VITALS
RESPIRATION RATE: 17 BRPM | OXYGEN SATURATION: 94 % | SYSTOLIC BLOOD PRESSURE: 168 MMHG | DIASTOLIC BLOOD PRESSURE: 70 MMHG | TEMPERATURE: 99 F | HEART RATE: 46 BPM

## 2022-09-21 DIAGNOSIS — I35.0 NONRHEUMATIC AORTIC (VALVE) STENOSIS: ICD-10-CM

## 2022-09-21 DIAGNOSIS — Z90.49 ACQUIRED ABSENCE OF OTHER SPECIFIED PARTS OF DIGESTIVE TRACT: Chronic | ICD-10-CM

## 2022-09-21 LAB
ANION GAP SERPL CALC-SCNC: 14 MMOL/L — SIGNIFICANT CHANGE UP (ref 5–17)
BASOPHILS # BLD AUTO: 0.03 K/UL — SIGNIFICANT CHANGE UP (ref 0–0.2)
BASOPHILS NFR BLD AUTO: 0.5 % — SIGNIFICANT CHANGE UP (ref 0–2)
BUN SERPL-MCNC: 45.9 MG/DL — HIGH (ref 8–20)
CALCIUM SERPL-MCNC: 9 MG/DL — SIGNIFICANT CHANGE UP (ref 8.4–10.5)
CHLORIDE SERPL-SCNC: 106 MMOL/L — SIGNIFICANT CHANGE UP (ref 98–107)
CO2 SERPL-SCNC: 22 MMOL/L — SIGNIFICANT CHANGE UP (ref 22–29)
CREAT SERPL-MCNC: 1.68 MG/DL — HIGH (ref 0.5–1.3)
EGFR: 39 ML/MIN/1.73M2 — LOW
EOSINOPHIL # BLD AUTO: 0.07 K/UL — SIGNIFICANT CHANGE UP (ref 0–0.5)
EOSINOPHIL NFR BLD AUTO: 1.2 % — SIGNIFICANT CHANGE UP (ref 0–6)
GLUCOSE SERPL-MCNC: 161 MG/DL — HIGH (ref 70–99)
HCT VFR BLD CALC: 47 % — SIGNIFICANT CHANGE UP (ref 39–50)
HGB BLD-MCNC: 15.2 G/DL — SIGNIFICANT CHANGE UP (ref 13–17)
IMM GRANULOCYTES NFR BLD AUTO: 0.7 % — SIGNIFICANT CHANGE UP (ref 0–0.9)
LYMPHOCYTES # BLD AUTO: 1.1 K/UL — SIGNIFICANT CHANGE UP (ref 1–3.3)
LYMPHOCYTES # BLD AUTO: 18.2 % — SIGNIFICANT CHANGE UP (ref 13–44)
MCHC RBC-ENTMCNC: 32.3 GM/DL — SIGNIFICANT CHANGE UP (ref 32–36)
MCHC RBC-ENTMCNC: 32.4 PG — SIGNIFICANT CHANGE UP (ref 27–34)
MCV RBC AUTO: 100.2 FL — HIGH (ref 80–100)
MONOCYTES # BLD AUTO: 0.86 K/UL — SIGNIFICANT CHANGE UP (ref 0–0.9)
MONOCYTES NFR BLD AUTO: 14.3 % — HIGH (ref 2–14)
NEUTROPHILS # BLD AUTO: 3.93 K/UL — SIGNIFICANT CHANGE UP (ref 1.8–7.4)
NEUTROPHILS NFR BLD AUTO: 65.1 % — SIGNIFICANT CHANGE UP (ref 43–77)
NT-PROBNP SERPL-SCNC: 1117 PG/ML — HIGH (ref 0–300)
PLATELET # BLD AUTO: 156 K/UL — SIGNIFICANT CHANGE UP (ref 150–400)
POTASSIUM SERPL-MCNC: 5.4 MMOL/L — HIGH (ref 3.5–5.3)
POTASSIUM SERPL-SCNC: 5.4 MMOL/L — HIGH (ref 3.5–5.3)
RBC # BLD: 4.69 M/UL — SIGNIFICANT CHANGE UP (ref 4.2–5.8)
RBC # FLD: 16 % — HIGH (ref 10.3–14.5)
SARS-COV-2 RNA SPEC QL NAA+PROBE: SIGNIFICANT CHANGE UP
SODIUM SERPL-SCNC: 142 MMOL/L — SIGNIFICANT CHANGE UP (ref 135–145)
WBC # BLD: 6.03 K/UL — SIGNIFICANT CHANGE UP (ref 3.8–10.5)
WBC # FLD AUTO: 6.03 K/UL — SIGNIFICANT CHANGE UP (ref 3.8–10.5)

## 2022-09-21 PROCEDURE — 80048 BASIC METABOLIC PNL TOTAL CA: CPT

## 2022-09-21 PROCEDURE — 93325 DOPPLER ECHO COLOR FLOW MAPG: CPT

## 2022-09-21 PROCEDURE — 93312 ECHO TRANSESOPHAGEAL: CPT | Mod: 26

## 2022-09-21 PROCEDURE — 93010 ELECTROCARDIOGRAM REPORT: CPT

## 2022-09-21 PROCEDURE — 85025 COMPLETE CBC W/AUTO DIFF WBC: CPT

## 2022-09-21 PROCEDURE — 93312 ECHO TRANSESOPHAGEAL: CPT

## 2022-09-21 PROCEDURE — 93320 DOPPLER ECHO COMPLETE: CPT

## 2022-09-21 PROCEDURE — 83880 ASSAY OF NATRIURETIC PEPTIDE: CPT

## 2022-09-21 PROCEDURE — 93005 ELECTROCARDIOGRAM TRACING: CPT

## 2022-09-21 PROCEDURE — 93320 DOPPLER ECHO COMPLETE: CPT | Mod: 26

## 2022-09-21 PROCEDURE — 36415 COLL VENOUS BLD VENIPUNCTURE: CPT

## 2022-09-21 PROCEDURE — U0003: CPT

## 2022-09-21 PROCEDURE — 93325 DOPPLER ECHO COLOR FLOW MAPG: CPT | Mod: 26

## 2022-09-21 PROCEDURE — U0005: CPT

## 2022-09-21 RX ORDER — AMLODIPINE BESYLATE 2.5 MG/1
1 TABLET ORAL
Qty: 90 | Refills: 3
Start: 2022-09-21 | End: 2023-09-15

## 2022-09-21 RX ORDER — AMLODIPINE BESYLATE 2.5 MG/1
1 TABLET ORAL
Qty: 0 | Refills: 0 | DISCHARGE

## 2022-09-21 RX ORDER — AMIODARONE HYDROCHLORIDE 400 MG/1
1 TABLET ORAL
Qty: 45 | Refills: 0
Start: 2022-09-21 | End: 2022-12-19

## 2022-09-21 RX ORDER — DOXAZOSIN MESYLATE 4 MG
1 TABLET ORAL
Qty: 90 | Refills: 3
Start: 2022-09-21 | End: 2023-09-15

## 2022-09-21 RX ORDER — CHLORHEXIDINE GLUCONATE 213 G/1000ML
1 SOLUTION TOPICAL ONCE
Refills: 0 | Status: DISCONTINUED | OUTPATIENT
Start: 2022-09-21 | End: 2022-10-05

## 2022-09-21 RX ORDER — PANTOPRAZOLE SODIUM 20 MG/1
1 TABLET, DELAYED RELEASE ORAL
Qty: 0 | Refills: 3 | DISCHARGE

## 2022-09-21 RX ORDER — AMLODIPINE BESYLATE 2.5 MG/1
5 TABLET ORAL ONCE
Refills: 0 | Status: COMPLETED | OUTPATIENT
Start: 2022-09-21 | End: 2022-09-21

## 2022-09-21 RX ORDER — FUROSEMIDE 40 MG
1 TABLET ORAL
Qty: 0 | Refills: 0 | DISCHARGE

## 2022-09-21 RX ORDER — METOPROLOL TARTRATE 50 MG
0 TABLET ORAL
Qty: 0 | Refills: 0 | DISCHARGE

## 2022-09-21 RX ORDER — ALLOPURINOL 300 MG
0 TABLET ORAL
Qty: 0 | Refills: 0 | DISCHARGE

## 2022-09-21 RX ORDER — AMIODARONE HYDROCHLORIDE 400 MG/1
1 TABLET ORAL
Qty: 0 | Refills: 0 | DISCHARGE

## 2022-09-21 RX ORDER — ATORVASTATIN CALCIUM 80 MG/1
1 TABLET, FILM COATED ORAL
Qty: 0 | Refills: 0 | DISCHARGE

## 2022-09-21 RX ORDER — ALLOPURINOL 300 MG
1 TABLET ORAL
Qty: 90 | Refills: 0
Start: 2022-09-21 | End: 2022-12-19

## 2022-09-21 RX ORDER — METOPROLOL TARTRATE 50 MG
0.5 TABLET ORAL
Qty: 45 | Refills: 3
Start: 2022-09-21 | End: 2023-09-15

## 2022-09-21 RX ORDER — AMIODARONE HYDROCHLORIDE 400 MG/1
1 TABLET ORAL
Qty: 19 | Refills: 0
Start: 2022-09-21 | End: 2022-10-28

## 2022-09-21 RX ORDER — METOPROLOL TARTRATE 50 MG
0.5 TABLET ORAL
Qty: 45 | Refills: 0
Start: 2022-09-21 | End: 2022-12-19

## 2022-09-21 RX ADMIN — AMLODIPINE BESYLATE 5 MILLIGRAM(S): 2.5 TABLET ORAL at 13:24

## 2022-09-21 NOTE — DISCHARGE NOTE NURSING/CASE MANAGEMENT/SOCIAL WORK - NSDCPEFALRISK_GEN_ALL_CORE
For information on Fall & Injury Prevention, visit: [email or web address] OR  [email or web address] OR  https://www.cdc.gov/steadi/patient.html

## 2022-09-21 NOTE — ASU PATIENT PROFILE, ADULT - FALL HARM RISK - RISK INTERVENTIONS

## 2022-09-21 NOTE — DISCHARGE NOTE PROVIDER - NSDCCPCAREPLAN_GEN_ALL_CORE_FT
PRINCIPAL DISCHARGE DIAGNOSIS  Diagnosis: Aortic stenosis  Assessment and Plan of Treatment: You have aortic stenosis, you should monitor for symptoms of heart failure: increasing shortness of breath, inability to lie flat, swelling in your legs, increased abdominal girth or bloating, any significant increase in weight, increasing dizziness or fatigue. You should cont to take your medications as prescribed and manage risk factors such as high cholesterol, high blood pressure, diabetes, and follow a heart healthy diet. Use caution with extertional activity given your severe aortic stenosis and potential to become dizzy and possible pass out. You will be contacted to have further evaluation with Dr. Echavarria.      SECONDARY DISCHARGE DIAGNOSES  Diagnosis: Pulmonary hypertension  Assessment and Plan of Treatment:     Diagnosis: HTN (hypertension)  Assessment and Plan of Treatment: Continue to take antihypertensive medications as prescribed. Obtain a home blood pressure monitor (at any pharmacy or medical supply store) and monitor blood pressure trends. Call your doctor if you note you blood pressure to be running much higher or lower than usual. Limit salt intake.    Diagnosis: HLD (hyperlipidemia)  Assessment and Plan of Treatment: Your diet should be low in fat, cholesterol, salt and carbohydrates, increase fruits (caution if diabetic), vegetables and whole grains/fiber rich foods. Take your cholesterol lowering medications as prescribed. Routine follow up lipid panels    Diagnosis: Chronic diastolic congestive heart failure  Assessment and Plan of Treatment: Take your medications as prescribed. Monitor for symptoms of heart failure: increasing shortness of breath, inability to lie flat, swelling in your legs, increased abdomiinal girth or bloating, any significant increase in weight, increasing dizziness or fatigue and notify your provider.    Diagnosis: Afib  Assessment and Plan of Treatment: Follow up with your cardiologist for continued management of your heart rate/AFib. Notify your doctor if any chest pain, palipitations, shortness of breath or dizziness. You should have a portable home device to check your heart rate and do so with any of the above symptoms.    Diagnosis: DM (diabetes mellitus)  Assessment and Plan of Treatment: Follow a carbohydrate controlled diet. Monitor your blood sugar levels before meals and at bedtime and keep a log of your values. Follow up for routine HbA1C levels which indicate your sugar control over the previous few months. You should ensure you have a provider that is closely monitoring your diabetes and keeping your sugar levels well controlled. Weight loss, carb controlled healthy diet and exercise can help improve your glucose control. It is also improtant to monitor for low sugar levels as levels too low (below 70) can be very dangerous. You should always carry sugar tablets or something to help bring the sugar level up if it drops too low. Symptoms of low sugar levls are: fatigue, lightheaded, mental confusion, fainting, sweating, shaking and excessive hunger.

## 2022-09-21 NOTE — DISCHARGE NOTE PROVIDER - NSDCFUSCHEDAPPT_GEN_ALL_CORE_FT
[facility]  [facility] [address] [facility]  Scheduled Appointment: 09/27/2022

## 2022-09-21 NOTE — DISCHARGE NOTE PROVIDER - CARE PROVIDER_API CALL
Kvng Echavarria)  Cardiovascular Disease  [address] [facility], [place] [address]  Phone: [phone]  [phone]: [phone]  Follow Up Time:

## 2022-09-21 NOTE — H&P PST ADULT - HISTORY OF PRESENT ILLNESS
Department of Cardiology                                                                  [facility]                                                                        [address], [place]                                                            Telephone: [phone]. Fax:[phone]                                                                                     Pre-QUOC Note        Narrative:  89yo male with h/o HTN, HLD, DM, CKD 3, HFpEF, AF with prior DCCV not on A/C due to prior GIB, prior in-pt adm [facility] with aspiration PNA c/b respiratory failure 2021 and discharged in stable condition, followed up with Dr Echavarria,       ASA and Mallampati: Per Anesthesia    ROS as stated above    Constitutional: A & O x 3  HEENT:   Normal oral mucosa, PERRL, EOMI	  Cardiovascular: Normal S1 S2, No JVD, No murmurs, No edema  Respiratory: Lungs clear to auscultation	  Gastrointestinal:  Soft, Non-tender, + BS	  Skin: No rashes, No ecchymoses, No cyanosis  Neurologic: Non-focal  Extremities: Normal range of motion, No clubbing, cyanosis or edema  Vascular: Peripheral pulses palpable 2+ bilaterally      ECG:  SR/1st degree AVB 48BPM with LBBB	    TTE 8/24/21:  LVEF 55%, basal-mid ante/antelat/inferolat wall hypokinesis, grade III diastolic dysfunction  mild to mod MR, mod TR, severe PHTN with PASP 80mmHg    LABS:	 	                        15.2   6.03  )-----------( 156      ( 21 Sep 2022 09:40 )             47.0       ASSESSMENT:    -QUOC as ordered  -Labs and ECG reviewed  -Procedure discussed with patient; risks and benefits explained; questions answered  -Consent obtained by Echocardiographer and anesthesiologist                                                                           Department of Cardiology                                                                  [facility]/[place] [address]                                                            Telephone: [phone]. Fax:[phone]                                                                                     Pre-QUOC Note        Narrative:  87yo male with h/o HTN, HLD, DM, CKD 3, essential tremor, HFpEF, AF with prior DCCV ~1.5yrs ago on amio and toprol and remains in SR, not on A/C due to prior GIB-last ~ 1-2yrs ago s/p APC/clipping of gastric AVMs, prior in-pt adm [facility] with aspiration PNA c/b respiratory failure 2021 and discharged in stable condition, followed up with Dr Echavarria via tele health visit Dec 2021, [place] Pierre mullins [place] [facility] [place] with preserved LVEF, grade III diastolic dysfunction, mild to mod MR, mod TR and severe PHTN with PASP 80mmHg, per patient report amyloid w/u neg, underwent R/LHC 3-4weeks ago at [facility] [place] report, presents today for QUOC to be performed by Dr Echavarria.    Of note, ECG/tele SB 45-48 resting with 1st degree AVB          ASA and Mallampati: Per Anesthesia    ROS as stated above    Constitutional: A & O x 3  HEENT:   Normal oral mucosa, PERRL, EOMI	  Cardiovascular: Normal S1 S2, No JVD, No murmurs, No edema  Respiratory: Lungs clear to auscultation	  Gastrointestinal:  Soft, Non-tender, + BS	  Skin: No rashes, No ecchymoses, No cyanosis  Neurologic: Non-focal  Extremities: Normal range of motion, No clubbing, cyanosis or edema  Vascular: Peripheral pulses palpable 2+ bilaterally      ECG:  SR/1st degree AVB 48BPM with LBBB	    TTE 8/24/21:  LVEF 55%, basal-mid ante/antelat/inferolat wall hypokinesis, grade III diastolic dysfunction  mild to mod MR, mod TR, severe PHTN with PASP 80mmHg    LABS:	 	                        15.2   6.03  )-----------( 156      ( 21 Sep 2022 09:40 )             47.0       ASSESSMENT:    -QUOC as ordered  -Labs and ECG reviewed  -Procedure discussed with patient; risks and benefits explained; questions answered  -Consent obtained by Echocardiographer and anesthesiologist                                                                           Department of Cardiology                                                                  [facility]/[place] [address]                                                            Telephone: [phone]. Fax:[phone]                                                                                     Pre-QUOC Note        Narrative:  89yo male with h/o HTN, HLD, DM, CKD 3, essential tremor, HFpEF, AF with prior DCCV ~1.5yrs ago on amio and toprol and remains in SR, not on A/C due to prior GIB-last ~ 1-2yrs ago s/p APC/clipping of gastric AVMs, prior in-pt adm [facility] with aspiration PNA c/b respiratory failure 2021 and discharged in stable condition, followed up with Dr Echavarria via tele health visit Dec 2021, [place] Pierre mullins [place] [facility] with preserved LVEF, grade III diastolic dysfunction, mild to mod MR, mod TR and severe PHTN with PASP 80mmHg, per patient report amyloid w/u neg, underwent R/LHC 3[place]weeks [place] [facility] [place] non-obstructive CAD, RHC with mod PHTN; Nipride challenge with PA mean 44 to 35mmHg, sat 63 to 67%, PCW 20 to 14mmHg, awaiting report, presents today for QUOC to be performed by Dr Echavarria.    Of note, ECG/tele SB 45-48 resting with 1st degree AVB          ASA and Mallampati: Per Anesthesia    ROS as stated above    Constitutional: A & O x 3  HEENT:   Normal oral mucosa, PERRL, EOMI	  Cardiovascular: Normal S1 S2, No JVD, No murmurs, No edema  Respiratory: Lungs clear to auscultation	  Gastrointestinal:  Soft, Non-tender, + BS	  Skin: No rashes, No ecchymoses, No cyanosis  Neurologic: Non-focal  Extremities: Normal range of motion, No clubbing, cyanosis or edema  Vascular: Peripheral pulses palpable 2+ bilaterally      ECG:  SR/1st degree AVB 48BPM with LBBB	    TTE 8/24/21:  LVEF 55%, basal-mid ante/antelat/inferolat wall hypokinesis, grade III diastolic dysfunction  mild to mod MR, mod TR, severe PHTN with PASP 80mmHg    LABS:	 	                        15.2   6.03  )-----------( 156      ( 21 Sep 2022 09:40 )             47.0       ASSESSMENT:    -QUOC as ordered  -Labs and ECG reviewed  -Procedure discussed with patient; risks and benefits explained; questions answered  -Consent obtained by Echocardiographer and anesthesiologist                                                                           Department of Cardiology                                                                  [facility]/[place] [address]                                                            Telephone: [phone]. Fax:[phone]                                                                                     Pre-QUOC Note        Narrative:  89yo male with h/o HTN, HLD, DM, CKD 3, essential tremor, HFpEF, AF with prior DCCV ~1.5yrs ago on amio and toprol and remains in SR, not on A/C due to prior GIB-last ~ 1-2yrs ago s/p APC/clipping of gastric AVMs, prior in-pt adm [facility] with aspiration PNA c/b respiratory failure 2021 and discharged in stable condition, followed up with Dr Echavarria via tele health visit Dec 2021, seen by Dr Pierre mullins at [facility] and underwent TTE with preserved LVEF, grade III diastolic dysfunction, mild to mod MR, mild AS, mod TR and severe PHTN with PASP 80mmHg, per patient report amyloid w/u neg, underwent R/LHC 3-4weeks ago at [facility] with non-obstructive CAD, RHC with Nipride challenge with PA mean 44 to 35mmHg, sat 63 to 67%, PCW 20 to 14mmHg, presents today for QUOC to assess hemodynamics, valvular and ventricular function.     Continues to endorse SAENZ at 1 block and fatigue but reports this is much improved since starting Jardiance    Of note, ECG/tele at rest SB 45-48 with 1st degree AVB HI 0.40 and LBBB, HR response after exertion 54, uncontrolled HTN, Cr 1.68, GFR 39, K 5.4, reports impaired stream during urination    Will decrease amiodarone to 200mg po every other day, decrease Toprol to 12.5mg po daily, add Norvasc 5mg po daily and cardura 4mg po qHS          ASA and Mallampati: Per Anesthesia    ROS as stated above    Constitutional: A & O x 3  HEENT:   Normal oral mucosa, PERRL, EOMI	  Cardiovascular: Normal S1 S2, No JVD, No murmurs, No edema  Respiratory: Lungs clear to auscultation	  Gastrointestinal:  Soft, Non-tender, + BS	  Skin: No rashes, No ecchymoses, No cyanosis  Neurologic: Non-focal  Extremities: Normal range of motion, No clubbing, cyanosis or edema  Vascular: Peripheral pulses palpable 2+ bilaterally      ECG:  SR/1st degree AVB 48BPM with LBBB	    TTE 8/24/21:  LVEF 55%, basal-mid ante/antelat/inferolat wall hypokinesis, grade III diastolic dysfunction  mild to mod MR, mod TR, severe PHTN with PASP 80mmHg    LABS:	 	                        15.2   6.03  )-----------( 156      ( 21 Sep 2022 09:40 )             47.0       ASSESSMENT: 89yo male with known VHD, severe PHTN, and Grade III diastolic dysfunction, continued to endorse SAENZ and fatigue now plan for QUOC to be performed by Dr Echavarria.    -QUOC as ordered  -Labs and ECG reviewed  -Procedure discussed with patient; risks and benefits explained; questions answered  -Consent obtained by Echocardiographer and anesthesiologist                                                                           Department of Cardiology                                                                  [facility]/[place] [address] [address]                                                            Telephone: [phone]. Fax:[phone]                                                                                     Pre-QUOC Note        Narrative:  89yo male with h/o HTN, HLD, DM, CKD 3, essential tremor, HFpEF, AF with prior DCCV ~1.5yrs ago on amio and toprol and remains in SR, not on A/C due to prior GIB-last ~ 1-2yrs ago s/p APC/clipping of gastric AVMs, prior in-pt adm [facility] with aspiration PNA c/b respiratory failure 2021 and discharged in stable condition, followed up with Dr Echavarria via tele health visit Dec 2021, seen by Dr Jay heart failure at [facility] [place] and underwent TTE with preserved LVEF, grade III diastolic dysfunction, mild to mod MR, mild AS, mod TR and severe PHTN with PASP 80mmHg, per patient report amyloid w/u neg, underwent R/LHC 3-4weeks ago at [facility] with non-obstructive CAD, RHC with Nipride challenge with PA mean 44 to 35mmHg, sat 63 to 67%, PCW 20 to 14mmHg, presents today for QUOC to assess hemodynamics, valvular and ventricular function.     Continues to endorse SAENZ at 1 block and fatigue but reports this is much improved since starting Jardiance    Of note, ECG/tele at rest SB 45-48 with 1st degree AVB IL 0.40 and LBBB, HR response after exertion 54, uncontrolled HTN, Cr 1.68, GFR 39, K 5.4, reports impaired stream during urination    Will decrease amiodarone to 200mg po every other day, decrease Toprol to 12.5mg po daily, add Norvasc 5mg po daily and cardura 4mg po qHS          ASA and Mallampati: Per Anesthesia    ROS as stated above    Constitutional: A & O x 3  HEENT:   Normal oral mucosa, PERRL, EOMI	  Cardiovascular: Normal S1 S2, No JVD, No murmurs, No edema  Respiratory: Lungs clear to auscultation	  Gastrointestinal:  Soft, Non-tender, + BS	  Skin: No rashes, No ecchymoses, No cyanosis  Neurologic: Non-focal  Extremities: Normal range of motion, No clubbing, cyanosis or edema  Vascular: Peripheral pulses palpable 2+ bilaterally      ECG:  SR/1st degree AVB 48BPM with LBBB	    R/LHC 9/6/22:  non-obstructive CAD  RHC with Nipride challenge  Baseline: PA 80/26 mean 44, sat 63%, PVR 7.5, PCW 20mmHg, CO/CI 3.2/1.6  Post Nipride PA 70/18 with mean 35, sat 67%, PCW 14mmHg, CO/CI 3.9/1.9    TTE 8/24/21:  LVEF 55%, basal-mid ante/antelat/inferolat wall hypokinesis, grade III diastolic dysfunction  mild to mod MR, mod TR, severe PHTN with PASP 80mmHg    LABS:	 	                        15.2   6.03  )-----------( 156      ( 21 Sep 2022 09:40 )             47.0       ASSESSMENT: 89yo male with known VHD, severe PHTN, and Grade III diastolic dysfunction, continued to endorse SAENZ and fatigue now plan for QUOC to be performed by Dr Echavarria.    -QUOC as ordered  -Labs and ECG reviewed  -Procedure discussed with patient; risks and benefits explained; questions answered  -Consent obtained by Echocardiographer and anesthesiologist                                                                           Department of Cardiology                                                                  [facility]/[place] [address] [facility]                                                            Telephone: [phone]. Fax:[phone]                                                                                     Pre-QUOC Note        Narrative:  87yo male with h/o HTN, HLD, DM, CKD 3, essential tremor, HFpEF, AF with prior DCCV ~1.5yrs ago on amio and toprol and remains in SR, not on A/C due to prior GIB-last ~ 1-2yrs ago s/p APC/clipping of gastric AVMs, prior in-pt adm [facility] with aspiration PNA c/b respiratory failure 2021 and discharged in stable condition, followed up with Dr Echavarria via tele health visit Dec 2021, seen by Dr Jay heart failure at [facility] and underwent TTE with preserved LVEF, grade III diastolic dysfunction, mild to mod MR, mild AS, mod TR and severe PHTN with PASP 80mmHg, per patient report amyloid w/u neg, underwent R/LHC 3-4weeks ago at [facility] with non-obstructive CAD, RHC with Nipride challenge with PA mean 44 to 35mmHg, sat 63 to 67%, PCW 20 to 14mmHg, CO/CO 3.2/1.6 to 3.9/1.9, presents today for QUOC to assess hemodynamics, valvular and ventricular function.     Continues to endorse SAENZ at 1 block and fatigue but reports this is much improved since starting Jardiance    Of note, ECG/tele at rest SB 45-48 with 1st degree AVB ND 0.40 and LBBB, HR response after exertion 54, uncontrolled HTN, Cr 1.68, GFR 39, K 5.4, reports impaired stream during urination    Will decrease amiodarone to 200mg po every other day, decrease Toprol to 12.5mg po daily, add Norvasc 5mg po daily and cardura 4mg po qHS          ASA and Mallampati: Per Anesthesia    ROS as stated above    Constitutional: A & O x 3  HEENT:   Normal oral mucosa, PERRL, EOMI	  Cardiovascular: Normal S1 S2, No JVD, No murmurs, No edema  Respiratory: Lungs clear to auscultation	  Gastrointestinal:  Soft, Non-tender, + BS	  Skin: No rashes, No ecchymoses, No cyanosis  Neurologic: Non-focal  Extremities: Normal range of motion, No clubbing, cyanosis or edema  Vascular: Peripheral pulses palpable 2+ bilaterally      ECG:  SR/1st degree AVB 48BPM with LBBB	    R/LHC 9/6/22:  non-obstructive CAD  RHC with Nipride challenge  Baseline: PA 80/26 mean 44, sat 63%, PVR 7.5, PCW 20mmHg, CO/CI 3.2/1.6  Post Nipride PA 70/18 with mean 35, sat 67%, PCW 14mmHg, CO/CI 3.9/1.9    TTE 8/24/21:  LVEF 55%, basal-mid ante/antelat/inferolat wall hypokinesis, grade III diastolic dysfunction  mild to mod MR, mod TR, severe PHTN with PASP 80mmHg    LABS:	 	                        15.2   6.03  )-----------( 156      ( 21 Sep 2022 09:40 )             47.0       ASSESSMENT: 87yo male with known VHD, severe PHTN, and Grade III diastolic dysfunction, continued to endorse SAENZ and fatigue now plan for QUOC to be performed by Dr Echavarria.    -QUOC as ordered  -Labs and ECG reviewed  -Procedure discussed with patient; risks and benefits explained; questions answered  -Consent obtained by Echocardiographer and anesthesiologist                                                                           Department of Cardiology                                                                  [facility]/[place] [address] [address] [facility]                                                            Telephone: [phone]. Fax:[phone]                                                                                     Pre-QUOC Note        Narrative:  89yo male with h/o HTN, HLD, DM, CKD 3, essential tremor, HFpEF, AF with prior DCCV ~1.5yrs ago on amio and toprol and remains in SR, not on A/C due to prior GIB-last ~ 1-2yrs ago s/p APC/clipping of gastric AVMs, prior in-pt adm [facility] with aspiration PNA c/b respiratory failure 2021 and discharged in stable condition, followed up with Dr Echavarria via tele health visit Dec 2021, seen by Dr Jay heart failure at [facility] and underwent TTE with preserved LVEF, grade III diastolic dysfunction, mild to mod MR, mild AS, mod TR and severe PHTN with PASP 80mmHg, per patient report amyloid w/u neg, underwent R/LHC at [facility] 9/6/22 with non-obstructive CAD, RHC with Nipride challenge with PA mean 44 to 35mmHg, sat 63 to 67%, PCW 20 to 14mmHg, CO/CO 3.2/1.6 to 3.9/1.9, presents today for QUOC to assess hemodynamics, valvular and ventricular function.     Continues to endorse SAENZ at 1 block and fatigue but reports this is much improved since starting Jardiance    Of note, ECG/tele at rest SB 45-48 with 1st degree AVB MN 0.40 and LBBB, HR response after exertion 54, uncontrolled HTN, Cr 1.68, GFR 39, K 5.4, reports impaired stream during urination    Will decrease amiodarone to 200mg po every other day, decrease Toprol to 12.5mg po daily, add Norvasc 5mg po daily and cardura 4mg po qHS          ASA and Mallampati: Per Anesthesia    ROS as stated above    Constitutional: A & O x 3  HEENT:   Normal oral mucosa, PERRL, EOMI	  Cardiovascular: Normal S1 S2, No JVD, No murmurs, No edema  Respiratory: Lungs clear to auscultation	  Gastrointestinal:  Soft, Non-tender, + BS	  Skin: No rashes, No ecchymoses, No cyanosis  Neurologic: Non-focal  Extremities: Normal range of motion, No clubbing, cyanosis or edema  Vascular: Peripheral pulses palpable 2+ bilaterally      ECG:  SR/1st degree AVB 48BPM with LBBB	    R/LHC 9/6/22:  non-obstructive CAD  RHC with Nipride challenge  Baseline: PA 80/26 mean 44, sat 63%, PVR 7.5, PCW 20mmHg, CO/CI 3.2/1.6  Post Nipride PA 70/18 with mean 35, sat 67%, PCW 14mmHg, CO/CI 3.9/1.9    TTE 8/24/21:  LVEF 55%, basal-mid ante/antelat/inferolat wall hypokinesis, grade III diastolic dysfunction  mild to mod MR, mod TR, severe PHTN with PASP 80mmHg    LABS:	 	                        15.2   6.03  )-----------( 156      ( 21 Sep 2022 09:40 )             47.0       ASSESSMENT: 89yo male with known VHD, severe PHTN, and Grade III diastolic dysfunction, continued to endorse SAENZ and fatigue now plan for QUOC to be performed by Dr Echavarria.    -QUOC as ordered  -Labs and ECG reviewed  -Procedure discussed with patient; risks and benefits explained; questions answered  -Consent obtained by Echocardiographer and anesthesiologist                                                                           Department of Cardiology                                                                  [facility]/[place] [address] [address]                                                            Telephone: [phone]. Fax:[phone]                                                                                     Pre-QUOC Note        Narrative:  87yo male with h/o HTN, HLD, DM, CKD 3, essential tremor, HFpEF, AF with prior DCCV ~1.5yrs ago on amio and toprol and remains in SR, not on A/C due to prior GIB-last ~ 1-2yrs ago s/p APC/clipping of gastric AVMs, prior in-pt adm [facility] with aspiration PNA c/b respiratory failure 2021 and discharged in stable condition, followed up with Dr Echavarria via tele health visit Dec 2021, seen by Dr Jay heart failure at [facility] and underwent TTE with preserved LVEF, grade III diastolic dysfunction, mild to mod MR, mild AS, mod TR and severe PHTN with PASP 80mmHg, per patient report amyloid w/u neg, underwent R/LHC at [facility] 9/6/22 with non-obstructive CAD, RHC with Nipride challenge with PA mean 44 to 35mmHg, sat 63 to 67%, PCW 20 to 14mmHg, CO/CO 3.2/1.6 to 3.9/1.9, presents today for QUOC to assess hemodynamics, valvular and ventricular function.     Continues to endorse SAENZ at 1 block and fatigue but reports this is much improved since starting Jardiance    Of note, ECG/tele at rest SB 45-48 with 1st degree AVB MS 0.40 and LBBB, HR response after exertion 54, uncontrolled HTN, Cr 1.68, GFR 39, K 5.4, reports impaired stream during urination    Will decrease amiodarone to 200mg po every other day, decrease Toprol to 12.5mg po daily, add Norvasc 5mg po daily and cardura 4mg po qHS          ASA and Mallampati: Per Anesthesia    ROS as stated above    Constitutional: A & O x 3  HEENT:   Normal oral mucosa, PERRL, EOMI	  Cardiovascular: Normal S1 S2, No JVD, III/VI systolic murmur, No edema  Respiratory: Lungs clear to auscultation	  Gastrointestinal:  Soft, Non-tender, + BS	  Skin: No rashes, No ecchymoses, No cyanosis  Neurologic: Non-focal  Extremities: Normal range of motion, No clubbing, cyanosis or edema  Vascular: Peripheral pulses palpable 2+ bilaterally      ECG:  SR/1st degree AVB 48BPM with LBBB	    R/LHC 9/6/22:  non-obstructive CAD  RHC with Nipride challenge  Baseline: PA 80/26 mean 44, sat 63%, PVR 7.5, PCW 20mmHg, CO/CI 3.2/1.6  Post Nipride PA 70/18 with mean 35, sat 67%, PCW 14mmHg, CO/CI 3.9/1.9    TTE 8/24/21:  LVEF 55%, basal-mid ante/antelat/inferolat wall hypokinesis, grade III diastolic dysfunction  mild to mod MR, mod TR, severe PHTN with PASP 80mmHg    LABS:	 	                        15.2   6.03  )-----------( 156      ( 21 Sep 2022 09:40 )             47.0       ASSESSMENT: 87yo male with known VHD, severe PHTN, and Grade III diastolic dysfunction, continued to endorse SAENZ and fatigue now plan for QUOC to be performed by Dr Echavarria.    -QUOC as ordered  -Labs and ECG reviewed  -Procedure discussed with patient; risks and benefits explained; questions answered  -Consent obtained by Echocardiographer and anesthesiologist                                                                           Department of Cardiology                                                                  [facility]/[place] [address]  [place]                                                            Telephone: [phone]. Fax:[phone]                                                                                     Pre-QUOC Note        Narrative:  87yo male with h/o HTN, HLD, DM, CKD 3, essential tremor, HFpEF, AF with prior DCCV ~1.5yrs ago on amio and toprol and remains in SR, not on A/C due to prior GIB-last ~ 1-2yrs ago s/p APC/clipping of gastric AVMs, prior in-pt adm [facility] with aspiration PNA c/b respiratory failure 2021 and discharged in stable condition, followed up with Dr Echavarria via tele health visit Dec 2021, seen by Dr Jay heart failure at [facility] [place] and underwent TTE with preserved LVEF, grade III diastolic dysfunction, mild to mod MR, mild AS, mod TR and severe PHTN with PASP 80mmHg, per patient report amyloid w/u neg, underwent R/LHC at [facility] 9/6/22 with non-obstructive CAD, RHC with Nipride challenge with PA mean 44 to 35mmHg, sat 63 to 67%, PCW 20 to 14mmHg, CO/CO 3.2/1.6 to 3.9/1.9, presents today for QUOC to assess hemodynamics, valvular and ventricular function.     Continues to endorse SAENZ at 1 block and fatigue but reports this is much improved since starting Jardiance    Of note, ECG/tele at rest SB 45-48 with 1st degree AVB ID 0.40 and LBBB, HR response after exertion 54, uncontrolled HTN, Cr 1.68, GFR 39, K 5.4, reports impaired stream during urination    Will decrease amiodarone to 200mg po every other day, decrease Toprol to 12.5mg po daily, decrease allopurinol to 100mg po daily, add Norvasc 5mg po daily and cardura 4mg po qHS          ASA and Mallampati: Per Anesthesia    ROS as stated above    Constitutional: A & O x 3  HEENT:   Normal oral mucosa, PERRL, EOMI	  Cardiovascular: Normal S1 S2, No JVD, III/VI systolic murmur, No edema  Respiratory: Lungs clear to auscultation	  Gastrointestinal:  Soft, Non-tender, + BS	  Skin: No rashes, No ecchymoses, No cyanosis  Neurologic: Non-focal  Extremities: Normal range of motion, No clubbing, cyanosis or edema  Vascular: Peripheral pulses palpable 2+ bilaterally      ECG:  SR/1st degree AVB 48BPM with LBBB	    R/LHC 9/6/22:  non-obstructive CAD  RHC with Nipride challenge  Baseline: PA 80/26 mean 44, sat 63%, PVR 7.5, PCW 20mmHg, CO/CI 3.2/1.6  Post Nipride PA 70/18 with mean 35, sat 67%, PCW 14mmHg, CO/CI 3.9/1.9    TTE 8/24/21:  LVEF 55%, basal-mid ante/antelat/inferolat wall hypokinesis, grade III diastolic dysfunction  mild to mod MR, mod TR, severe PHTN with PASP 80mmHg    LABS:	 	                        15.2   6.03  )-----------( 156      ( 21 Sep 2022 09:40 )             47.0       ASSESSMENT: 87yo male with known VHD, severe PHTN, and Grade III diastolic dysfunction, continued to endorse SAENZ and fatigue now plan for QUOC to be performed by Dr Echavarria.    -QUOC as ordered  -Labs and ECG reviewed  -Procedure discussed with patient; risks and benefits explained; questions answered  -Consent obtained by Echocardiographer and anesthesiologist  -decrease amiodarone to 200mg po every other day  -decrease Toprol to 12.5mg po daily  -decrease allopurinol to 100mg po daily  -add Norvasc 5mg po daily   -add cardura 4mg po qHS

## 2022-09-21 NOTE — DISCHARGE NOTE NURSING/CASE MANAGEMENT/SOCIAL WORK - NSDCVIVACCINE_GEN_ALL_CORE_FT
Tdap; 27-May-2022 11:42; Kuldeep Reyna (RN); [employer]; [id number]     (Exp. Date: 18-Apr-2024); IntraMuscular; Deltoid Left.; 0.5 milliLiter(s); VIS (VIS Published: 09-May-2013, VIS Presented: 27-May-2022);

## 2022-09-21 NOTE — DISCHARGE NOTE PROVIDER - NSDCFUADDINST_GEN_ALL_CORE_FT
Do not drive or operate machinery today as you have received sedation. You should take it easy today and take your time, especially when changing positions. Follow up with  *** to further discuss the results of the QUOC and any further evaluations and recommendations going forward.

## 2022-09-21 NOTE — DISCHARGE NOTE PROVIDER - NSDCMRMEDTOKEN_GEN_ALL_CORE_FT
allopurinol 100 mg oral tablet: 1 tab(s) orally once a day   AMIODARONE  MG TABLET: 1 tab(s) orally every other day   atorvastatin 20 mg oral tablet: 1 tab(s) orally once a day  Cardura XL 4 mg oral tablet, extended release: 1 tab(s) orally once a day (at bedtime)   furosemide 10 mg/mL oral liquid: orally once a day  Jardiance 10 mg oral tablet: 1 tab(s) orally once a day (in the morning)  METOPROLOL ER SUCCINATE 25MG TABS: 0.5 tab(s) orally once a day   Norvasc 5 mg oral tablet: 1 tab(s) orally once a day   quinapril 40 mg oral tablet: 1 tab(s) orally once a day

## 2022-09-21 NOTE — DISCHARGE NOTE PROVIDER - NSDCCPTREATMENT_GEN_ALL_CORE_FT
PRINCIPAL PROCEDURE  Procedure: US echo transesophageal  Findings and Treatment: Do not drive or operate machinery today as you have received sedation. You should take it easy today and take your time, especially when changing positions. Follow up with  *** to further discuss the results of the QUOC and any further evaluations and recommendations going forward.

## 2022-09-21 NOTE — DISCHARGE NOTE NURSING/CASE MANAGEMENT/SOCIAL WORK - PATIENT PORTAL LINK FT
You can access the FollowMyHealth Patient Portal offered by [facility] by registering at the following website: [email or web address]. By joining [employer]’s FollowMyHealth portal, you will also be able to view your health information using other applications (apps) compatible with our system.

## 2022-09-21 NOTE — DISCHARGE NOTE PROVIDER - HOSPITAL COURSE
Narrative:  87yo male with h/o HTN, HLD, DM, CKD 3, essential tremor, HFpEF, AF with prior DCCV ~1.5yrs ago on amio and toprol and remains in SR, not on A/C due to prior GIB-last ~ 1-2yrs ago s/p APC/clipping of gastric AVMs, prior in-pt adm [facility] with aspiration PNA c/b respiratory failure 2021 and discharged in stable condition, followed up with Dr Echavarria via tele health visit Dec 2021, seen by Dr Jay hear harpreet at [facility] [place] and underwent TTE with preserved LVEF, grade III diastolic dysfunction, mild to mod MR, mild AS, mod TR and severe PHTN with PASP 80mmHg, per patient report amyloid w/u neg, underwent R/LHC 3-4weeks ago at [facility] [place] with non-obstructive CAD, RHC with Nipride challenge with PA mean 44 to 35mmHg, sat 63 to 67%, PCW 20 to 14mmHg, presents today for QUOC to assess hemodynamics, valvular and ventricular function.     Continues to endorse SAENZ at 1 block and fatigue but reports this is much improved since starting Jardiance    Of note, ECG/tele at rest SB 45-48 with 1st degree AVB AL 0.40 and LBBB, HR response after exertion 54, uncontrolled HTN, Cr 1.68, GFR 39, K 5.4, reports impaired stream during urination    Will decrease amiodarone to 200mg po every other day, decrease Toprol to 12.5mg po daily, add Norvasc 5mg po daily and cardura 4mg po qHS        Now s/p QUOC with severe AS (AWAIS 0.8-0,9cm2), mild to mod MR, mod TR, severe PHTN, preserved LVEF and diastolic dysfunction, tolerated well, in NAD and HDS, plan for D/C after recovery and will f/u with Dr Echavarria for further management. Dr Echavarria will arrange consult with Dr. Mills for possible TAVR evaluation.     -Formal QUOC report pending  -Post QUOC management/monitoring per protocol  -Medication adjustments as stated above  -D/C home after recovery  -F/U with Dr Echavarria  -Plan possible TAVR evaluation per Dr Echavarria   Narrative:  89yo male with h/o HTN, HLD, DM, CKD 3, essential tremor, HFpEF, AF with prior DCCV ~1.5yrs ago on amio and toprol and remains in SR, not on A/C due to prior GIB-last ~ 1-2yrs ago s/p APC/clipping of gastric AVMs, prior in-pt adm [facility] with aspiration PNA c/b respiratory failure 2021 and discharged in stable condition, followed up with Dr Echavarria via tele health visit Dec 2021, seen by Dr Jay hear harpreet at [facility] and underwent TTE with preserved LVEF, grade III diastolic dysfunction, mild to mod MR, mild AS, mod TR and severe PHTN with PASP 80mmHg, per patient report amyloid w/u neg, underwent R/LHC 3-4weeks ago at [facility] with non-obstructive CAD, RHC with Nipride challenge with PA mean 44 to 35mmHg, sat 63 to 67%, PCW 20 to 14mmHg, presents today for QUOC to assess hemodynamics, valvular and ventricular function.     Continues to endorse SAENZ at 1 block and fatigue but reports this is much improved since starting Jardiance    Of note, ECG/tele at rest SB 45-48 with 1st degree AVB CT 0.40 and LBBB, HR response after exertion 54, uncontrolled HTN, Cr 1.68, GFR 39, K 5.4, reports impaired stream during urination    Will decrease amiodarone to 200mg po every other day, decrease Toprol to 12.5mg po daily, decrease allopurinol to 100mg po daily, add Norvasc 5mg po daily and cardura 4mg po qHS        Now s/p QUOC with severe AS (AWAIS 0.8-0,9cm2), mild to mod MR, mod TR, severe PHTN, preserved LVEF and diastolic dysfunction, tolerated well, in NAD and HDS, plan for D/C after recovery and will f/u with Dr Echavarria for further management. Dr Echavarria will arrange consult with Dr. Mills for possible TAVR evaluation.     -Formal QUOC report pending  -Post QUOC management/monitoring per protocol  -decrease amiodarone to 200mg po every other day  -decrease Toprol to 12.5mg po daily  -decrease allopurinol to 100mg po daily  -add Norvasc 5mg po daily   -add cardura 4mg po qHS      -D/C home after recovery  -F/U with Dr Echavarria  -Plan possible TAVR evaluation per Dr Echavarria

## 2022-09-27 ENCOUNTER — APPOINTMENT (OUTPATIENT)
Dept: OPHTHALMOLOGY | Facility: CLINIC | Age: 87
End: 2022-09-27

## 2022-09-28 ENCOUNTER — NON-APPOINTMENT (OUTPATIENT)
Age: 87
End: 2022-09-28

## 2022-09-28 ENCOUNTER — APPOINTMENT (OUTPATIENT)
Dept: CARDIOLOGY | Facility: CLINIC | Age: 87
End: 2022-09-28

## 2022-09-28 VITALS
OXYGEN SATURATION: 95 % | HEART RATE: 53 BPM | DIASTOLIC BLOOD PRESSURE: 76 MMHG | WEIGHT: 190 LBS | TEMPERATURE: 97.5 F | BODY MASS INDEX: 28.14 KG/M2 | HEIGHT: 69 IN | SYSTOLIC BLOOD PRESSURE: 172 MMHG

## 2022-09-28 VITALS — SYSTOLIC BLOOD PRESSURE: 174 MMHG | DIASTOLIC BLOOD PRESSURE: 79 MMHG

## 2022-09-28 DIAGNOSIS — I45.89 OTHER SPECIFIED CONDUCTION DISORDERS: ICD-10-CM

## 2022-09-28 DIAGNOSIS — R94.31 ABNORMAL ELECTROCARDIOGRAM [ECG] [EKG]: ICD-10-CM

## 2022-09-28 DIAGNOSIS — I35.0 NONRHEUMATIC AORTIC (VALVE) STENOSIS: ICD-10-CM

## 2022-09-28 DIAGNOSIS — K92.2 GASTROINTESTINAL HEMORRHAGE, UNSPECIFIED: ICD-10-CM

## 2022-09-28 DIAGNOSIS — I10 ESSENTIAL (PRIMARY) HYPERTENSION: ICD-10-CM

## 2022-09-28 PROCEDURE — 93000 ELECTROCARDIOGRAM COMPLETE: CPT

## 2022-09-28 PROCEDURE — 99215 OFFICE O/P EST HI 40 MIN: CPT

## 2022-09-28 RX ORDER — CEFPODOXIME PROXETIL 200 MG/1
200 TABLET, FILM COATED ORAL
Refills: 0 | Status: DISCONTINUED | COMMUNITY
End: 2022-09-28

## 2022-09-28 RX ORDER — METOPROLOL SUCCINATE 25 MG/1
25 TABLET, EXTENDED RELEASE ORAL DAILY
Refills: 0 | Status: ACTIVE | COMMUNITY

## 2022-09-28 RX ORDER — AMIODARONE HYDROCHLORIDE 200 MG/1
200 TABLET ORAL EVERY OTHER DAY
Refills: 0 | Status: ACTIVE | COMMUNITY

## 2022-09-28 RX ORDER — ATORVASTATIN CALCIUM 20 MG/1
20 TABLET, FILM COATED ORAL
Refills: 0 | Status: ACTIVE | COMMUNITY

## 2022-09-28 RX ORDER — ATORVASTATIN CALCIUM 40 MG/1
40 TABLET, FILM COATED ORAL DAILY
Qty: 30 | Refills: 0 | Status: DISCONTINUED | COMMUNITY
Start: 2020-10-18 | End: 2022-09-28

## 2022-09-28 RX ORDER — FUROSEMIDE 80 MG/1
TABLET ORAL DAILY
Refills: 0 | Status: ACTIVE | COMMUNITY

## 2022-09-28 RX ORDER — QUINAPRIL HYDROCHLORIDE 40 MG/1
40 TABLET, FILM COATED ORAL DAILY
Refills: 0 | Status: ACTIVE | COMMUNITY

## 2022-09-28 RX ORDER — ALLOPURINOL 100 MG/1
100 TABLET ORAL TWICE DAILY
Qty: 60 | Refills: 0 | Status: DISCONTINUED | COMMUNITY
Start: 2020-10-18 | End: 2022-09-28

## 2022-09-28 RX ORDER — FUROSEMIDE 20 MG/1
20 TABLET ORAL
Refills: 0 | Status: DISCONTINUED | COMMUNITY
End: 2022-09-28

## 2022-09-28 RX ORDER — EMPAGLIFLOZIN 10 MG/1
10 TABLET, FILM COATED ORAL DAILY
Refills: 0 | Status: ACTIVE | COMMUNITY

## 2022-09-28 RX ORDER — AMLODIPINE BESYLATE 5 MG/1
5 TABLET ORAL DAILY
Refills: 0 | Status: ACTIVE | COMMUNITY

## 2022-09-28 RX ORDER — METOPROLOL SUCCINATE 25 MG/1
25 TABLET, EXTENDED RELEASE ORAL DAILY
Qty: 30 | Refills: 1 | Status: DISCONTINUED | COMMUNITY
Start: 2020-10-18 | End: 2022-09-28

## 2022-09-28 RX ORDER — DOXAZOSIN MESYLATE 4 MG/1
4 TABLET, FILM COATED, EXTENDED RELEASE ORAL DAILY
Qty: 90 | Refills: 0 | Status: DISCONTINUED | COMMUNITY
Start: 1900-01-01 | End: 2022-09-28

## 2022-09-28 RX ORDER — ALLOPURINOL 100 MG/1
100 TABLET ORAL DAILY
Refills: 0 | Status: ACTIVE | COMMUNITY

## 2022-09-29 ENCOUNTER — APPOINTMENT (OUTPATIENT)
Dept: CARDIOLOGY | Facility: CLINIC | Age: 87
End: 2022-09-29

## 2022-09-30 ENCOUNTER — RX RENEWAL (OUTPATIENT)
Age: 87
End: 2022-09-30

## 2022-09-30 RX ORDER — DOXAZOSIN 4 MG/1
4 TABLET ORAL
Qty: 90 | Refills: 0 | Status: ACTIVE | COMMUNITY
Start: 2022-09-28 | End: 1900-01-01

## 2022-10-02 PROBLEM — I45.89 CHRONOTROPIC INCOMPETENCE: Status: ACTIVE | Noted: 2022-10-02

## 2022-10-02 PROBLEM — I10 HYPERTENSION: Status: ACTIVE | Noted: 2020-10-18

## 2022-10-02 PROBLEM — I35.0 AORTIC VALVE STENOSIS, SEVERE: Status: ACTIVE | Noted: 2022-10-02

## 2022-10-02 PROBLEM — R94.31 ABNORMAL EKG: Status: ACTIVE | Noted: 2022-10-02

## 2022-10-02 PROBLEM — K92.2 LOWER GI BLEED: Status: ACTIVE | Noted: 2020-10-18

## 2022-10-02 NOTE — REVIEW OF SYSTEMS
[Weight Gain (___ Lbs)] : no recent weight gain [Feeling Fatigued] : feeling fatigued [Weight Loss (___ Lbs)] : [unfilled] ~Ulb weight loss [Seeing Double (Diplopia)] : no diplopia [Discharge From Ears] : no discharge from the ears [Dyspnea on exertion] : dyspnea during exertion [Chest Discomfort] : no chest discomfort [Lower Ext Edema] : no extremity edema [Leg Claudication] : no intermittent leg claudication [Orthopnea] : no orthopnea [Cough] : no cough [Abdominal Pain] : no abdominal pain [Nausea] : no nausea [Urinary Frequency] : urinary frequency [Hematuria] : no hematuria [Joint Pain] : joint pain [Confusion] : no confusion was observed [Under Stress] : not under stress [Easy Bleeding] : a tendency for easy bleeding

## 2022-10-02 NOTE — PHYSICAL EXAM
[Well Developed] : well developed [Normal Conjunctiva] : normal conjunctiva [Normal S1, S2] : normal S1, S2 [Clear Lung Fields] : clear lung fields [Soft] : abdomen soft [Non Tender] : non-tender [Moves all extremities] : moves all extremities [Alert and Oriented] : alert and oriented [de-identified] : + JVD [de-identified] : 3/6 sm lsb, s2 is delayed but still audible

## 2022-10-02 NOTE — HISTORY OF PRESENT ILLNESS
[FreeTextEntry1] : 88-year-old male with history of hypertension hyperlipidemia heart failure with preserved LV function, atrial fibrillation status post cardioversion in the past.  Type 2 diabetes, CKD stage III, gout, GERD, history of AVMs with GI bleed.\par \par Transthoracic echocardiogram performed on 8/24/2021 at the [facility] showed: Normal left ventricular systolic function ejection fraction 55%, basal to mid anterior anterolateral and inferolateral walls are hypokinetic with grade 3 diastolic dysfunction markedly elevated left atrial pressure.  Right ventricle was dilated with mild decrease RV function.  Moderate left atrial dilation severe pulmonary hypertension moderate tricuspid valve regurgitation mild to moderate mitral valve regurgitation mild valvular aortic stenosis.\par \par Right heart catheterization demonstrated a pulmonary capillary wedge pressure of 20 mmHg, PA 80/20/44, RV 80/12, RA 12 cardiac output thermodilution 3.2 L/min, cardiac index 1.6 L/min/m², PVR of 7.5 Wood units.\par \par Left heart cardiac catheterization demonstrated less than 30% RCA occlusion, left main normal, LAD less than 30% and circumflex with nonobstructive disease.\par

## 2022-10-02 NOTE — ASSESSMENT
[FreeTextEntry1] : BP elevated\par Did not take cardura since long acting medication was too  expensive. Cardura 4 mg hs was reordered. \par on norvasc as well. BP check in [place] in 1-2 weeks. \par AS, severe based on TTE and also found to be severe at [facility]. Pt with Hx of AVM, not a candidate for antiplatelet or ac. \par PH, pre and post capillay PH\par Hr is low, patient with chronotropic incompetence on exam, decrease amiodarone 200 mg daily.\par IVCD and first-degree AV block.  May need pacemaker in the future.  Discussed with patient and wife.\par Obtain Holter monitor next month\par .  Follow-up next months.\par

## 2023-03-17 NOTE — PROGRESS NOTE ADULT - PROBLEM SELECTOR PLAN 7
diet-controlled; cont. statin; check HbA1c; monitor blood glucose
Presentation:  - unknown baseline EF  - takes metoprolol and lasix, unsure of dosages  - endorsing feeling weak x 6mo with SOB since cardioversion in 2/2021, and SOB for last 6 months which he attributes to CHF, w/ exercise tolerance of ~50 feet  - exam w/ scattered crackles, leg edema to shins, no JVD    Plan:  - med rec in AM and restart HF meds w/ hold parameters  - f/u BNP  - TTE  - PT  - f/u outpt PCP/cardiology for CHF and AFib which may be contributing to pt's symptoms of SOB, SAENZ
37w
Presentation:  - unknown baseline EF  - takes metoprolol and lasix, unsure of dosages  - endorsing feeling weak x 6mo with SOB since cardioversion in 2/2021, and SOB for last 6 months which he attributes to CHF, w/ exercise tolerance of ~50 feet  - exam w/ scattered crackles, leg edema to shins, no JVD    Plan:  - med rec in AM and restart HF meds w/ hold parameters  - f/u BNP  - TTE  - PT  - f/u outpt PCP/cardiology for CHF and AFib which may be contributing to pt's symptoms of SOB, SAENZ

## 2023-09-19 NOTE — ED ADULT TRIAGE NOTE - RESPIRATORY RATE (BREATHS/MIN)
18 Island Pedicle Flap-Requiring Vessel Identification Text: The defect edges were debeveled with a #15 scalpel blade.  Given the location of the defect, shape of the defect and the proximity to free margins an island pedicle advancement flap was deemed most appropriate.  Using a sterile surgical marker, an appropriate advancement flap was drawn, based on the axial vessel mentioned above, incorporating the defect, outlining the appropriate donor tissue and placing the expected incisions within the relaxed skin tension lines where possible.    The area thus outlined was incised deep to adipose tissue with a #15 scalpel blade.  The skin margins were undermined to an appropriate distance in all directions around the primary defect and laterally outward around the island pedicle utilizing iris scissors.  There was minimal undermining beneath the pedicle flap.

## 2023-10-14 ENCOUNTER — INPATIENT (INPATIENT)
Facility: HOSPITAL | Age: 88
LOS: 1 days | Discharge: HOME HEALTH | DRG: 871 | End: 2023-10-16
Attending: STUDENT IN AN ORGANIZED HEALTH CARE EDUCATION/TRAINING PROGRAM | Admitting: GENERAL ACUTE CARE HOSPITAL
Payer: MEDICARE

## 2023-10-14 VITALS
TEMPERATURE: 99 F | DIASTOLIC BLOOD PRESSURE: 78 MMHG | WEIGHT: 195.11 LBS | HEIGHT: 69 IN | OXYGEN SATURATION: 90 % | HEART RATE: 88 BPM | SYSTOLIC BLOOD PRESSURE: 128 MMHG | RESPIRATION RATE: 22 BRPM

## 2023-10-14 DIAGNOSIS — J18.9 PNEUMONIA, UNSPECIFIED ORGANISM: ICD-10-CM

## 2023-10-14 DIAGNOSIS — I50.32 CHRONIC DIASTOLIC (CONGESTIVE) HEART FAILURE: ICD-10-CM

## 2023-10-14 DIAGNOSIS — Z90.49 ACQUIRED ABSENCE OF OTHER SPECIFIED PARTS OF DIGESTIVE TRACT: Chronic | ICD-10-CM

## 2023-10-14 DIAGNOSIS — E11.9 TYPE 2 DIABETES MELLITUS WITHOUT COMPLICATIONS: ICD-10-CM

## 2023-10-14 DIAGNOSIS — I10 ESSENTIAL (PRIMARY) HYPERTENSION: ICD-10-CM

## 2023-10-14 DIAGNOSIS — M10.9 GOUT, UNSPECIFIED: ICD-10-CM

## 2023-10-14 DIAGNOSIS — J96.01 ACUTE RESPIRATORY FAILURE WITH HYPOXIA: ICD-10-CM

## 2023-10-14 DIAGNOSIS — I48.91 UNSPECIFIED ATRIAL FIBRILLATION: ICD-10-CM

## 2023-10-14 DIAGNOSIS — A41.9 SEPSIS, UNSPECIFIED ORGANISM: ICD-10-CM

## 2023-10-14 DIAGNOSIS — E78.5 HYPERLIPIDEMIA, UNSPECIFIED: ICD-10-CM

## 2023-10-14 DIAGNOSIS — Z29.9 ENCOUNTER FOR PROPHYLACTIC MEASURES, UNSPECIFIED: ICD-10-CM

## 2023-10-14 DIAGNOSIS — N18.30 CHRONIC KIDNEY DISEASE, STAGE 3 UNSPECIFIED: ICD-10-CM

## 2023-10-14 LAB
ALBUMIN SERPL ELPH-MCNC: 3.8 G/DL — SIGNIFICANT CHANGE UP (ref 3.3–5)
ALP SERPL-CCNC: 72 U/L — SIGNIFICANT CHANGE UP (ref 40–120)
ALT FLD-CCNC: 13 U/L — SIGNIFICANT CHANGE UP (ref 10–45)
ANION GAP SERPL CALC-SCNC: 12 MMOL/L — SIGNIFICANT CHANGE UP (ref 5–17)
ANISOCYTOSIS BLD QL: SLIGHT — SIGNIFICANT CHANGE UP
APPEARANCE UR: CLEAR — SIGNIFICANT CHANGE UP
APTT BLD: 29.1 SEC — SIGNIFICANT CHANGE UP (ref 24.5–35.6)
AST SERPL-CCNC: 20 U/L — SIGNIFICANT CHANGE UP (ref 10–40)
BASOPHILS # BLD AUTO: 0.12 K/UL — SIGNIFICANT CHANGE UP (ref 0–0.2)
BASOPHILS NFR BLD AUTO: 0.9 % — SIGNIFICANT CHANGE UP (ref 0–2)
BILIRUB SERPL-MCNC: 0.8 MG/DL — SIGNIFICANT CHANGE UP (ref 0.2–1.2)
BILIRUB UR-MCNC: NEGATIVE — SIGNIFICANT CHANGE UP
BUN SERPL-MCNC: 34 MG/DL — HIGH (ref 7–23)
BURR CELLS BLD QL SMEAR: PRESENT — SIGNIFICANT CHANGE UP
CALCIUM SERPL-MCNC: 8.5 MG/DL — SIGNIFICANT CHANGE UP (ref 8.4–10.5)
CHLORIDE SERPL-SCNC: 106 MMOL/L — SIGNIFICANT CHANGE UP (ref 96–108)
CO2 SERPL-SCNC: 22 MMOL/L — SIGNIFICANT CHANGE UP (ref 22–31)
COLOR SPEC: YELLOW — SIGNIFICANT CHANGE UP
CREAT SERPL-MCNC: 1.54 MG/DL — HIGH (ref 0.5–1.3)
D DIMER BLD IA.RAPID-MCNC: 223 NG/ML DDU — SIGNIFICANT CHANGE UP
D DIMER BLD IA.RAPID-MCNC: 231 NG/ML DDU — HIGH
DIFF PNL FLD: NEGATIVE — SIGNIFICANT CHANGE UP
EGFR: 43 ML/MIN/1.73M2 — LOW
EOSINOPHIL # BLD AUTO: 0 K/UL — SIGNIFICANT CHANGE UP (ref 0–0.5)
EOSINOPHIL NFR BLD AUTO: 0 % — SIGNIFICANT CHANGE UP (ref 0–6)
GLUCOSE BLDC GLUCOMTR-MCNC: 121 MG/DL — HIGH (ref 70–99)
GLUCOSE BLDC GLUCOMTR-MCNC: 131 MG/DL — HIGH (ref 70–99)
GLUCOSE SERPL-MCNC: 193 MG/DL — HIGH (ref 70–99)
GLUCOSE UR QL: >=1000
HCT VFR BLD CALC: 44.5 % — SIGNIFICANT CHANGE UP (ref 39–50)
HGB BLD-MCNC: 15 G/DL — SIGNIFICANT CHANGE UP (ref 13–17)
INR BLD: 1.06 — SIGNIFICANT CHANGE UP (ref 0.85–1.18)
KETONES UR-MCNC: NEGATIVE — SIGNIFICANT CHANGE UP
LACTATE SERPL-SCNC: 1.8 MMOL/L — SIGNIFICANT CHANGE UP (ref 0.5–2)
LEGIONELLA AG UR QL: NEGATIVE — SIGNIFICANT CHANGE UP
LEUKOCYTE ESTERASE UR-ACNC: NEGATIVE — SIGNIFICANT CHANGE UP
LYMPHOCYTES # BLD AUTO: 0.12 K/UL — LOW (ref 1–3.3)
LYMPHOCYTES # BLD AUTO: 0.9 % — LOW (ref 13–44)
MANUAL SMEAR VERIFICATION: SIGNIFICANT CHANGE UP
MCHC RBC-ENTMCNC: 32.3 PG — SIGNIFICANT CHANGE UP (ref 27–34)
MCHC RBC-ENTMCNC: 33.7 GM/DL — SIGNIFICANT CHANGE UP (ref 32–36)
MCV RBC AUTO: 95.7 FL — SIGNIFICANT CHANGE UP (ref 80–100)
MONOCYTES # BLD AUTO: 1.23 K/UL — HIGH (ref 0–0.9)
MONOCYTES NFR BLD AUTO: 9.6 % — SIGNIFICANT CHANGE UP (ref 2–14)
NEUTROPHILS # BLD AUTO: 11.35 K/UL — HIGH (ref 1.8–7.4)
NEUTROPHILS NFR BLD AUTO: 88.6 % — HIGH (ref 43–77)
NITRITE UR-MCNC: NEGATIVE — SIGNIFICANT CHANGE UP
OVALOCYTES BLD QL SMEAR: SLIGHT — SIGNIFICANT CHANGE UP
PH UR: 6 — SIGNIFICANT CHANGE UP (ref 5–8)
PLAT MORPH BLD: ABNORMAL
PLATELET # BLD AUTO: 123 K/UL — LOW (ref 150–400)
POIKILOCYTOSIS BLD QL AUTO: SLIGHT — SIGNIFICANT CHANGE UP
POLYCHROMASIA BLD QL SMEAR: SLIGHT — SIGNIFICANT CHANGE UP
POTASSIUM SERPL-MCNC: 4.8 MMOL/L — SIGNIFICANT CHANGE UP (ref 3.5–5.3)
POTASSIUM SERPL-SCNC: 4.8 MMOL/L — SIGNIFICANT CHANGE UP (ref 3.5–5.3)
PROT SERPL-MCNC: 7 G/DL — SIGNIFICANT CHANGE UP (ref 6–8.3)
PROT UR-MCNC: NEGATIVE MG/DL — SIGNIFICANT CHANGE UP
PROTHROM AB SERPL-ACNC: 12.1 SEC — SIGNIFICANT CHANGE UP (ref 9.5–13)
RAPID RVP RESULT: SIGNIFICANT CHANGE UP
RBC # BLD: 4.65 M/UL — SIGNIFICANT CHANGE UP (ref 4.2–5.8)
RBC # FLD: 17.1 % — HIGH (ref 10.3–14.5)
RBC BLD AUTO: ABNORMAL
S PNEUM AG UR QL: NEGATIVE — SIGNIFICANT CHANGE UP
SARS-COV-2 RNA SPEC QL NAA+PROBE: SIGNIFICANT CHANGE UP
SODIUM SERPL-SCNC: 140 MMOL/L — SIGNIFICANT CHANGE UP (ref 135–145)
SP GR SPEC: 1.01 — SIGNIFICANT CHANGE UP (ref 1–1.03)
UROBILINOGEN FLD QL: 0.2 E.U./DL — SIGNIFICANT CHANGE UP
WBC # BLD: 12.81 K/UL — HIGH (ref 3.8–10.5)
WBC # FLD AUTO: 12.81 K/UL — HIGH (ref 3.8–10.5)

## 2023-10-14 PROCEDURE — 99223 1ST HOSP IP/OBS HIGH 75: CPT

## 2023-10-14 PROCEDURE — 99285 EMERGENCY DEPT VISIT HI MDM: CPT

## 2023-10-14 PROCEDURE — 71045 X-RAY EXAM CHEST 1 VIEW: CPT | Mod: 26

## 2023-10-14 RX ORDER — LISINOPRIL 2.5 MG/1
1 TABLET ORAL
Refills: 0 | DISCHARGE

## 2023-10-14 RX ORDER — AMIODARONE HYDROCHLORIDE 400 MG/1
200 TABLET ORAL DAILY
Refills: 0 | Status: DISCONTINUED | OUTPATIENT
Start: 2023-10-14 | End: 2023-10-16

## 2023-10-14 RX ORDER — ATORVASTATIN CALCIUM 80 MG/1
20 TABLET, FILM COATED ORAL AT BEDTIME
Refills: 0 | Status: DISCONTINUED | OUTPATIENT
Start: 2023-10-14 | End: 2023-10-16

## 2023-10-14 RX ORDER — INSULIN LISPRO 100/ML
VIAL (ML) SUBCUTANEOUS
Refills: 0 | Status: DISCONTINUED | OUTPATIENT
Start: 2023-10-14 | End: 2023-10-16

## 2023-10-14 RX ORDER — HEPARIN SODIUM 5000 [USP'U]/ML
5000 INJECTION INTRAVENOUS; SUBCUTANEOUS EVERY 8 HOURS
Refills: 0 | Status: DISCONTINUED | OUTPATIENT
Start: 2023-10-14 | End: 2023-10-14

## 2023-10-14 RX ORDER — ALLOPURINOL 300 MG
100 TABLET ORAL DAILY
Refills: 0 | Status: DISCONTINUED | OUTPATIENT
Start: 2023-10-14 | End: 2023-10-16

## 2023-10-14 RX ORDER — SODIUM CHLORIDE 9 MG/ML
1000 INJECTION, SOLUTION INTRAVENOUS
Refills: 0 | Status: DISCONTINUED | OUTPATIENT
Start: 2023-10-14 | End: 2023-10-16

## 2023-10-14 RX ORDER — APIXABAN 2.5 MG/1
1 TABLET, FILM COATED ORAL
Refills: 0 | DISCHARGE

## 2023-10-14 RX ORDER — GLUCAGON INJECTION, SOLUTION 0.5 MG/.1ML
1 INJECTION, SOLUTION SUBCUTANEOUS ONCE
Refills: 0 | Status: DISCONTINUED | OUTPATIENT
Start: 2023-10-14 | End: 2023-10-16

## 2023-10-14 RX ORDER — ATORVASTATIN CALCIUM 80 MG/1
1 TABLET, FILM COATED ORAL
Qty: 0 | Refills: 0 | DISCHARGE

## 2023-10-14 RX ORDER — FUROSEMIDE 40 MG
0 TABLET ORAL
Qty: 0 | Refills: 0 | DISCHARGE

## 2023-10-14 RX ORDER — EMPAGLIFLOZIN 10 MG/1
1 TABLET, FILM COATED ORAL
Qty: 0 | Refills: 0 | DISCHARGE

## 2023-10-14 RX ORDER — APIXABAN 2.5 MG/1
2.5 TABLET, FILM COATED ORAL EVERY 12 HOURS
Refills: 0 | Status: DISCONTINUED | OUTPATIENT
Start: 2023-10-14 | End: 2023-10-16

## 2023-10-14 RX ORDER — QUINAPRIL HYDROCHLORIDE 40 MG/1
1 TABLET, FILM COATED ORAL
Qty: 0 | Refills: 0 | DISCHARGE

## 2023-10-14 RX ORDER — AZITHROMYCIN 500 MG/1
500 TABLET, FILM COATED ORAL ONCE
Refills: 0 | Status: COMPLETED | OUTPATIENT
Start: 2023-10-14 | End: 2023-10-14

## 2023-10-14 RX ORDER — AMLODIPINE BESYLATE 2.5 MG/1
1 TABLET ORAL
Refills: 0 | DISCHARGE

## 2023-10-14 RX ORDER — ONDANSETRON 8 MG/1
4 TABLET, FILM COATED ORAL EVERY 8 HOURS
Refills: 0 | Status: DISCONTINUED | OUTPATIENT
Start: 2023-10-14 | End: 2023-10-16

## 2023-10-14 RX ORDER — DEXTROSE 50 % IN WATER 50 %
12.5 SYRINGE (ML) INTRAVENOUS ONCE
Refills: 0 | Status: DISCONTINUED | OUTPATIENT
Start: 2023-10-14 | End: 2023-10-16

## 2023-10-14 RX ORDER — AMIODARONE HYDROCHLORIDE 400 MG/1
1 TABLET ORAL
Refills: 0 | DISCHARGE

## 2023-10-14 RX ORDER — LANOLIN ALCOHOL/MO/W.PET/CERES
3 CREAM (GRAM) TOPICAL AT BEDTIME
Refills: 0 | Status: DISCONTINUED | OUTPATIENT
Start: 2023-10-14 | End: 2023-10-16

## 2023-10-14 RX ORDER — SODIUM CHLORIDE 9 MG/ML
500 INJECTION INTRAMUSCULAR; INTRAVENOUS; SUBCUTANEOUS ONCE
Refills: 0 | Status: COMPLETED | OUTPATIENT
Start: 2023-10-14 | End: 2023-10-14

## 2023-10-14 RX ORDER — DEXTROSE 50 % IN WATER 50 %
25 SYRINGE (ML) INTRAVENOUS ONCE
Refills: 0 | Status: DISCONTINUED | OUTPATIENT
Start: 2023-10-14 | End: 2023-10-16

## 2023-10-14 RX ORDER — CEFTRIAXONE 500 MG/1
1000 INJECTION, POWDER, FOR SOLUTION INTRAMUSCULAR; INTRAVENOUS EVERY 24 HOURS
Refills: 0 | Status: DISCONTINUED | OUTPATIENT
Start: 2023-10-15 | End: 2023-10-16

## 2023-10-14 RX ORDER — CEFTRIAXONE 500 MG/1
1000 INJECTION, POWDER, FOR SOLUTION INTRAMUSCULAR; INTRAVENOUS ONCE
Refills: 0 | Status: COMPLETED | OUTPATIENT
Start: 2023-10-14 | End: 2023-10-14

## 2023-10-14 RX ORDER — DEXTROSE 50 % IN WATER 50 %
15 SYRINGE (ML) INTRAVENOUS ONCE
Refills: 0 | Status: DISCONTINUED | OUTPATIENT
Start: 2023-10-14 | End: 2023-10-16

## 2023-10-14 RX ORDER — ACETAMINOPHEN 500 MG
650 TABLET ORAL EVERY 6 HOURS
Refills: 0 | Status: DISCONTINUED | OUTPATIENT
Start: 2023-10-14 | End: 2023-10-16

## 2023-10-14 RX ORDER — INFLUENZA VIRUS VACCINE 15; 15; 15; 15 UG/.5ML; UG/.5ML; UG/.5ML; UG/.5ML
0.7 SUSPENSION INTRAMUSCULAR ONCE
Refills: 0 | Status: DISCONTINUED | OUTPATIENT
Start: 2023-10-14 | End: 2023-10-16

## 2023-10-14 RX ADMIN — HEPARIN SODIUM 5000 UNIT(S): 5000 INJECTION INTRAVENOUS; SUBCUTANEOUS at 16:18

## 2023-10-14 RX ADMIN — AZITHROMYCIN 500 MILLIGRAM(S): 500 TABLET, FILM COATED ORAL at 14:27

## 2023-10-14 RX ADMIN — CEFTRIAXONE 100 MILLIGRAM(S): 500 INJECTION, POWDER, FOR SOLUTION INTRAMUSCULAR; INTRAVENOUS at 14:26

## 2023-10-14 RX ADMIN — ATORVASTATIN CALCIUM 20 MILLIGRAM(S): 80 TABLET, FILM COATED ORAL at 22:54

## 2023-10-14 RX ADMIN — SODIUM CHLORIDE 1000 MILLILITER(S): 9 INJECTION INTRAMUSCULAR; INTRAVENOUS; SUBCUTANEOUS at 14:27

## 2023-10-14 RX ADMIN — APIXABAN 2.5 MILLIGRAM(S): 2.5 TABLET, FILM COATED ORAL at 19:13

## 2023-10-14 NOTE — H&P ADULT - HISTORY OF PRESENT ILLNESS
87M PMH HTN, HLD, CHF, Afib (s/p cardioversion), T2D, CKD3 (BL Cr 1.5-1.7), gout, GERD, GI AVMs (hx bleeds) presenting with cough and dyspnea fopr past 2 days, being admitted for sepsis 2/2 CAP   87M PMH HTN, HLD, Afib (s/p cardioversion), T2D, CKD3 (baseline Cr 1.5-1.7), gout, GERD, GI AVMs (hx bleeds) presenting with productive cough along with dyspnea for the past 1 day. Patient reports he started experiencing a productive cough of yellowish sputum last night which progressively worsened over the past 24 hours. He reports some dyspnea since this morning, but noticeable improvement after being placed on oxygen. He denies any fever or chills, any sick contacts. He has not had any recent hospitalizations. On ROS, patient reports fatigue.     In the ED, patient afebrile, normotensive, tachypneic to 22 bpm, satting 97% on 2L NC. WBC 12.81 with neutrophilic predominance. D-dimer neg. Lactate 1.8. Cr 1.54. RVP neg.  87M PMH HTN, HLD, HFpEF, Afib (s/p cardioversion), T2D, CKD3 (baseline Cr 1.5-1.7), gout, GERD, GI AVMs (hx bleeds) presenting with productive cough along with dyspnea for the past 1 day. Patient reports he started experiencing a productive cough of yellowish sputum last night which progressively worsened over the past 24 hours. He reports some dyspnea since this morning, but noticeable improvement after being placed on oxygen. He denies any fever or chills, any sick contacts. He has not had any recent hospitalizations. On ROS, patient reports fatigue.     In the ED, patient afebrile, normotensive, tachypneic to 22 bpm, satting 97% on 2L NC. WBC 12.81 with neutrophilic predominance. D-dimer neg. Lactate 1.8. Cr 1.54. RVP neg. EKG with Afib with LBBB (unchanged compared to previous). CXR with left lower lobe infiltrates.

## 2023-10-14 NOTE — H&P ADULT - ATTENDING COMMENTS
Patient seen and examined today at bedside. Chart, labs, vitals, radiology reviewed. Above H&P/notes reviewed and edited where appropriate. Agree with history and physical exam. Agree with assessment and plan. I reviewed the overnight course of events and discussed the care with the patient and team.    Briefly: 87M with PMH HTN, HLD, HFpEF, Afib (s/p cardioversion, on AC), T2D, CKD3 (baseline Cr 1.5-1.7), gout on allopurinol, GERD, GI AVMs (hx bleeds) admitted for sepsis likely d/t CAP , legionella ag neg, Cont on CTX, Azithromycin dc'd, Follow pro-debi; Cont home meds; avoid nephrotoxins.

## 2023-10-14 NOTE — ED ADULT NURSE NOTE - OBJECTIVE STATEMENT
Pt presents to the ED with SOB, fatigue, cough, and subjective fever x 1 day. Pt reports a PMH of afib, CHF, tremor. Pt denies sick contacts. A+Ox3, reports that SOB is mild, cough is productive with light, blood tinged phlegm. Sating at 95% on 2L NC, speaking in full sentences, no audible wheeze, no accessory muscle use. Pt denies chest pain or palpitations. n/v/d or urinary symptoms. Pending provider assessment.

## 2023-10-14 NOTE — PATIENT PROFILE ADULT - FALL HARM RISK - RISK INTERVENTIONS

## 2023-10-14 NOTE — ED ADULT NURSE NOTE - NEURO ASSESSMENT
Name: Nav Blackman MRN: [id number] [id number]    YOB: 1980  Age: 39 y.o. Sex: female        Chief Complaint   Patient presents with    Well Woman       HPI  Denies depression   Does eat a well balanced diet  Does exercise 4 times per week  Denies domestic abuse  Last tdap w/i the past 10 years,       2. Dysmenorrhea--> would like to start COCs    OB History      Para Term  AB TAB SAB Ectopic Multiple Living    2 2        2        Obstetric Comments    No complications    Periods regular, last 4 days, flow heavy to light, moderate to severe dysmenorrhea  History of sexually transmitted infections never  Last pap 2014           History   Sexual Activity    Sexual activity: Yes    Partners: Male    Birth control/ protection: Surgical     Comment: vasectomy       Past Medical History:   Diagnosis Date    Knee MCL sprain 2013    right, with bone bruise       Past Surgical History:   Procedure Laterality Date    HX BREAST AUGMENTATION      HX BREAST AUGMENTATION         No Active Allergies    Current Outpatient Prescriptions on File Prior to Visit   Medication Sig Dispense Refill    MULTIVITAMIN PO Take  by mouth. No current facility-administered medications on file prior to visit.         Social History     Social History    Marital status:      Spouse name: N/A    Number of children: N/A    Years of education: N/A     Occupational History    Nurse [employer]     Social History Main Topics    Smoking status: Never Smoker    Smokeless tobacco: Never Used    Alcohol use 1.2 oz/week     2 Glasses of wine per week      Comment: once per week    Drug use: No    Sexual activity: Yes     Partners: Male     Birth control/ protection: Surgical      Comment: vasectomy     Other Topics Concern    Not on file     Social History Narrative       Family History   Problem Relation Age of Onset    Cancer Father      lung    Diabetes Father     Hypertension Father     Breast Cancer Maternal Grandmother     Heart Attack Maternal Grandfather        Review of Systems   Constitutional: Negative. HENT: Negative. Respiratory: Negative. Cardiovascular: Negative. Gastrointestinal: Negative. Genitourinary: Negative. Musculoskeletal: Negative. Skin: Negative. Neurological: Negative. Psychiatric/Behavioral: Negative. Visit Vitals    /62 (BP 1 Location: Right arm, BP Patient Position: Sitting)    Pulse 73    Ht 5' 5\" (1.651 m)    Wt 125 lb (56.7 kg)    LMP 03/15/2017 (Exact Date)    BMI 20.8 kg/m2       GENERAL:  Well developed, well nourished, in no distress  NEURO/PSYCHE: Grossly intact, normal mood and affect  HEENT: Normal cephalic, atraumatic, good dentition, neck supple. No thyromegaly  BREASTS: breasts appear normal, no suspicious masses, no skin or nipple changes, implants noted  CV: regular rate and rhythm  LUNGS: clear to auscultation bilaterally, no wheezes, rhonchi or rales, good air entry with normal effort  ABDOMEN: + BS, soft without tenderness, no guarding, rebound or masses  EXTREMITIES: no edema or erythema noted  SKIN:  Warm, dry, no lesions  LYMPHATICS: No supraclavicular, axillary or inguinal nodes noted    PELVIC EXAM:  LABIA MAJORA: no masses, tenderness or lesions  LABIA MINORA: no masses, tenderness or lesions  CLITORIS: no masses, tenderness or lesions  URETHRA: normal appearing, no masses or tenderness  BLADDER: no fullness or tenderness  VAGINA: pink appearing vagina with physiologic discharge, no lesions   PERINEUM: no masses, tenderness or lesions  CERVIX: No CMT or lesions, s/p cryo, friable  UTERUS: small, mobile, nontender  ADNEXA: nontender and no masses    1. Well woman exam with routine gynecological exam  Reviewed diet, weight, exercise, and domestic abuse. Reviewed tetanus vaccine. All of her questions were discussed. - PAP IG, CT-NG-TV, APTIMA HPV AND RFX 22/75,94([id number]); Future    2. Dysmenorrhea  Would like to start COCs, camrese rx'ed, will do f/u BP check in Dr. Reinaldo Motley office    3. Encounter for screening examination for sexually transmitted disease    - PAP IG, CT-NG-TV, APTIMA HPV AND [address] 33/19,32([id number]); Future  - HIV 1/2 AG/AB, 4TH GENERATION,W RFLX CONFIRM;  Future          F/U PRN - - -

## 2023-10-14 NOTE — ED ADULT TRIAGE NOTE - CHIEF COMPLAINT QUOTE
Patient with hx CHF and Afib to the ED c/o shortness of breath, cough, fever and chills x 2 days. 91% on 4 L NC, does not wear O2 at home. Speaking in complete sentences, AAOX4, NAD.

## 2023-10-14 NOTE — H&P ADULT - PROBLEM SELECTOR PLAN 2
Found hypoxemic on arrival.  Found with CAP based on symptoms and radiologic findings.     - Follow up procal  - Low concern for PE, currently on full AC, low d-dimer  - CAP plan as below

## 2023-10-14 NOTE — H&P ADULT - PROBLEM SELECTOR PLAN 1
Met 2/4 sepsis criteria for RR>20 and WBC>12  Most likely in the setting of infectious process, concern for CAP.  No urinary, GI symptoms, no new skin findings.     - Follow up blood culture  - CAP plan as below

## 2023-10-14 NOTE — ED ADULT NURSE NOTE - NSFALLHARMRISKINTERV_ED_ALL_ED
Assistance OOB with selected safe patient handling equipment if applicable/Communicate risk of Fall with Harm to all staff, patient, and family/Monitor gait and stability/Provide patient with walking aids/Provide visual cue: red socks, yellow wristband, yellow gown, etc/Reinforce activity limits and safety measures with patient and family/Bed in lowest position, wheels locked, appropriate side rails in place/Call bell, personal items and telephone in reach/Instruct patient to call for assistance before getting out of bed/chair/stretcher/Non-slip footwear applied when patient is off stretcher/[place] to call system/Physically safe environment - no spills, clutter or unnecessary equipment/Purposeful Proactive Rounding/Room/bathroom lighting operational, light cord in reach

## 2023-10-14 NOTE — ED ADULT TRIAGE NOTE - NS ED TRIAGE AVPU SCALE
Dx: Strain of left shoulder (S46.912A)           Insurance (Authorized # of Visits):   Worker's Comp (12)            Authorizing Physician: Dr. Karla Zee  Next MD visit: next week  Fall Risk: standard         Precautions: n/a             Subjective: Pt reports Manual:  STM to L shoulder  Cervical distraction - grd II-III Manual:  STM to L shoulder  Cervical distraction - grd II-III   TherEx:  Pulleys into flex to ~100 degrees - 5 mins  Quadruped rocking with focus on upward rotation of B scapulae - 10x5\" TherEx Alert-The patient is alert, awake and responds to voice. The patient is oriented to time, place, and person. The triage nurse is able to obtain subjective information.

## 2023-10-14 NOTE — H&P ADULT - PROBLEM SELECTOR PLAN 7
Euvolemic on exam other than trace bilateral LE edema     - Follow up echo  - can consider lasix 20 mg PO/IV prn

## 2023-10-14 NOTE — ED PROVIDER NOTE - OBJECTIVE STATEMENT
89-year-old  HTN, HLD, DM, CKD 3, essential tremor, HFpEF, AF with prior DCCV with complaints of weakness fever chills productive cough and red blood since last night.  Patient states felt he had Rales in his left lower lung and w assoc fatigue became concerned, no hx of DVT/PE, states compliant w meds.

## 2023-10-14 NOTE — H&P ADULT - NSHPPHYSICALEXAM_GEN_ALL_CORE
VITAL SIGNS:  T(C): 36.8 (10-14-23 @ 16:20), Max: 37.2 (10-14-23 @ 16:01)  T(F): 98.3 (10-14-23 @ 16:20), Max: 98.9 (10-14-23 @ 16:01)  HR: 86 (10-14-23 @ 16:20) (82 - 88)  BP: 140/72 (10-14-23 @ 16:20) (125/70 - 140/72)  BP(mean): --  RR: 16 (10-14-23 @ 16:20) (16 - 22)  SpO2: 95% (10-14-23 @ 16:20) (90% - 97%)  Wt(kg): --    PHYSICAL EXAM:    Constitutional: NAD  Head: NC/AT  Eyes: PERRL, EOMI, anicteric sclera  ENT: no nasal discharge; uvula midline, no oropharyngeal erythema or exudates; dry mucous membranes   Neck: supple; no JVD or thyromegaly  Respiratory: decreased breath sides in left lower lung fields   Cardiac: +S1/S2; irregular rhythm, regular rate   Gastrointestinal: abdomen soft, NT/ND; no rebound or guarding; +BSx4  Extremities: WWP, no clubbing or cyanosis; no peripheral edema  Musculoskeletal: NROM x4; no joint swelling, tenderness or erythema  Vascular: 2+ radial, femoral, DP/PT pulses B/L  Dermatologic: skin warm, dry and intact; no rashes, wounds, or scars  Neurologic: AAOx3; CNII-XII grossly intact; no focal deficits

## 2023-10-14 NOTE — H&P ADULT - NSHPLABSRESULTS_GEN_ALL_CORE
15.0   12.81 )-----------( 123      ( 14 Oct 2023 13:27 )             44.5       10-14    140  |  106  |  34<H>  ----------------------------<  193<H>  4.8   |  22  |  1.54<H>    Ca    8.5      14 Oct 2023 13:27    TPro  7.0  /  Alb  3.8  /  TBili  0.8  /  DBili  x   /  AST  20  /  ALT  13  /  AlkPhos  72  10-14              Urinalysis Basic - ( 14 Oct 2023 14:40 )    Color: Yellow / Appearance: Clear / S.015 / pH: x  Gluc: x / Ketone: NEGATIVE  / Bili: Negative / Urobili: 0.2 E.U./dL   Blood: x / Protein: NEGATIVE mg/dL / Nitrite: NEGATIVE   Leuk Esterase: NEGATIVE / RBC: x / WBC x   Sq Epi: x / Non Sq Epi: x / Bacteria: x        PT/INR - ( 14 Oct 2023 14:40 )   PT: 12.1 sec;   INR: 1.06          PTT - ( 14 Oct 2023 14:40 )  PTT:29.1 sec          CAPILLARY BLOOD GLUCOSE      POCT Blood Glucose.: 121 mg/dL (14 Oct 2023 16:44)

## 2023-10-14 NOTE — ED ADULT NURSE NOTE - NS ED NURSE REPORT GIVEN DT
14-Oct-2023 16:03 Estlander Flap (Upper To Lower Lip) Text: The defect of the lower lip was assessed and measured.  Given the location and size of the defect, an Estlander flap was deemed most appropriate.  Using a sterile surgical marker, an appropriate Estlander flap was measured and drawn on the upper lip. Local anesthesia was then infiltrated. A scalpel was then used to incise the lateral aspect of the flap, through skin, muscle and mucosa, leaving the flap pedicled medially.  The flap was then rotated and positioned to fill the lower lip defect.  The flap was then sutured into place with a three layer technique, closing the orbicularis oris muscle layer with subcutaneous buried sutures, followed by a mucosal layer and an epidermal layer.

## 2023-10-14 NOTE — ED PROVIDER NOTE - PHYSICAL EXAMINATION
CONSTITUTIONAL: Awake, alert and in no apparent distress.  HEENT: Head is atraumatic. Eyes clear bilaterally, normal EOMI. Airway patent.  CARDIAC: Normal rate, regular rhythm.  Heart sounds S1, S2.   RESPIRATORY: coarse sounds/rales in Left lower lobe, no tachypnea, respiratory distress.   GASTROINTESTINAL: Abdomen soft, non-tender, no guarding, distension.  MUSCULOSKELETAL: Spine appears normal, no midline spinal tenderness, range of motion is not limited, no muscle or joint tenderness. no bony tenderness.   NEUROLOGICAL: Alert, no focal deficits, no motor or sensory deficits.  SKIN: Skin normal color for race, warm, dry and intact. No evidence of rash.  PSYCHIATRIC: Normal mood and affect. no apparent risk to self or others.

## 2023-10-14 NOTE — H&P ADULT - ASSESSMENT
87M PMH HTN, HLD, HFpEF, Afib (s/p cardioversion), T2D, CKD3 (baseline Cr 1.5-1.7), gout, GERD, GI AVMs (hx bleeds) presenting with productive cough along with dyspnea for the past 1 day. Patient reports he started experiencing a productive cough of yellowish sputum last night which progressively worsened over the past 24 hours, being admitted for sepsis 2/2 CAP

## 2023-10-14 NOTE — H&P ADULT - PROBLEM SELECTOR PLAN 3
- Started CTX and azithromycin  - Follow up urine leg/Strep  - If no improvement then consider MRSA swab, however no risk factors for MRSA at this time

## 2023-10-14 NOTE — ED PROVIDER NOTE - CLINICAL SUMMARY MEDICAL DECISION MAKING FREE TEXT BOX
89-year-old  HTN, HLD, DM, CKD 3, essential tremor, HFpEF, AF with prior DCCV with complaints of weakness fever chills productive cough and red blood since last night.  Patient states felt he had Rales in his left lower lung and w assoc fatigue became concerned, no hx of DVT/PE, states compliant w meds.  mild hypoxia at 90, placed on NC, lll rales on cxr, given picture  ddx CAP, URI, PE, other  no current chest pain, less likely ACS, abd soft  plan for sepsis eval/cxr

## 2023-10-15 ENCOUNTER — TRANSCRIPTION ENCOUNTER (OUTPATIENT)
Age: 88
End: 2023-10-15

## 2023-10-15 LAB
A1C WITH ESTIMATED AVERAGE GLUCOSE RESULT: 6.6 % — HIGH (ref 4–5.6)
ALBUMIN SERPL ELPH-MCNC: 3.2 G/DL — LOW (ref 3.3–5)
ALP SERPL-CCNC: 50 U/L — SIGNIFICANT CHANGE UP (ref 40–120)
ALT FLD-CCNC: 9 U/L — LOW (ref 10–45)
ANION GAP SERPL CALC-SCNC: 8 MMOL/L — SIGNIFICANT CHANGE UP (ref 5–17)
AST SERPL-CCNC: 14 U/L — SIGNIFICANT CHANGE UP (ref 10–40)
BASOPHILS # BLD AUTO: 0.03 K/UL — SIGNIFICANT CHANGE UP (ref 0–0.2)
BASOPHILS NFR BLD AUTO: 0.4 % — SIGNIFICANT CHANGE UP (ref 0–2)
BILIRUB SERPL-MCNC: 0.9 MG/DL — SIGNIFICANT CHANGE UP (ref 0.2–1.2)
BUN SERPL-MCNC: 29 MG/DL — HIGH (ref 7–23)
CALCIUM SERPL-MCNC: 8.3 MG/DL — LOW (ref 8.4–10.5)
CHLORIDE SERPL-SCNC: 107 MMOL/L — SIGNIFICANT CHANGE UP (ref 96–108)
CO2 SERPL-SCNC: 25 MMOL/L — SIGNIFICANT CHANGE UP (ref 22–31)
CREAT SERPL-MCNC: 1.39 MG/DL — HIGH (ref 0.5–1.3)
EGFR: 48 ML/MIN/1.73M2 — LOW
EOSINOPHIL # BLD AUTO: 0.06 K/UL — SIGNIFICANT CHANGE UP (ref 0–0.5)
EOSINOPHIL NFR BLD AUTO: 0.8 % — SIGNIFICANT CHANGE UP (ref 0–6)
ESTIMATED AVERAGE GLUCOSE: 143 MG/DL — HIGH (ref 68–114)
GLUCOSE BLDC GLUCOMTR-MCNC: 101 MG/DL — HIGH (ref 70–99)
GLUCOSE BLDC GLUCOMTR-MCNC: 121 MG/DL — HIGH (ref 70–99)
GLUCOSE BLDC GLUCOMTR-MCNC: 150 MG/DL — HIGH (ref 70–99)
GLUCOSE BLDC GLUCOMTR-MCNC: 169 MG/DL — HIGH (ref 70–99)
GLUCOSE SERPL-MCNC: 111 MG/DL — HIGH (ref 70–99)
HCT VFR BLD CALC: 40.9 % — SIGNIFICANT CHANGE UP (ref 39–50)
HGB BLD-MCNC: 13.1 G/DL — SIGNIFICANT CHANGE UP (ref 13–17)
IMM GRANULOCYTES NFR BLD AUTO: 0.5 % — SIGNIFICANT CHANGE UP (ref 0–0.9)
LYMPHOCYTES # BLD AUTO: 1.11 K/UL — SIGNIFICANT CHANGE UP (ref 1–3.3)
LYMPHOCYTES # BLD AUTO: 14.3 % — SIGNIFICANT CHANGE UP (ref 13–44)
MAGNESIUM SERPL-MCNC: 2.1 MG/DL — SIGNIFICANT CHANGE UP (ref 1.6–2.6)
MCHC RBC-ENTMCNC: 31.9 PG — SIGNIFICANT CHANGE UP (ref 27–34)
MCHC RBC-ENTMCNC: 32 GM/DL — SIGNIFICANT CHANGE UP (ref 32–36)
MCV RBC AUTO: 99.5 FL — SIGNIFICANT CHANGE UP (ref 80–100)
MONOCYTES # BLD AUTO: 0.97 K/UL — HIGH (ref 0–0.9)
MONOCYTES NFR BLD AUTO: 12.5 % — SIGNIFICANT CHANGE UP (ref 2–14)
NEUTROPHILS # BLD AUTO: 5.56 K/UL — SIGNIFICANT CHANGE UP (ref 1.8–7.4)
NEUTROPHILS NFR BLD AUTO: 71.5 % — SIGNIFICANT CHANGE UP (ref 43–77)
NRBC # BLD: 0 /100 WBCS — SIGNIFICANT CHANGE UP (ref 0–0)
PHOSPHATE SERPL-MCNC: 3.6 MG/DL — SIGNIFICANT CHANGE UP (ref 2.5–4.5)
PLATELET # BLD AUTO: 117 K/UL — LOW (ref 150–400)
POTASSIUM SERPL-MCNC: 4.3 MMOL/L — SIGNIFICANT CHANGE UP (ref 3.5–5.3)
POTASSIUM SERPL-SCNC: 4.3 MMOL/L — SIGNIFICANT CHANGE UP (ref 3.5–5.3)
PROCALCITONIN SERPL-MCNC: 0.41 NG/ML — HIGH (ref 0.02–0.1)
PROT SERPL-MCNC: 5.9 G/DL — LOW (ref 6–8.3)
RBC # BLD: 4.11 M/UL — LOW (ref 4.2–5.8)
RBC # FLD: 17.4 % — HIGH (ref 10.3–14.5)
SODIUM SERPL-SCNC: 140 MMOL/L — SIGNIFICANT CHANGE UP (ref 135–145)
WBC # BLD: 7.77 K/UL — SIGNIFICANT CHANGE UP (ref 3.8–10.5)
WBC # FLD AUTO: 7.77 K/UL — SIGNIFICANT CHANGE UP (ref 3.8–10.5)

## 2023-10-15 PROCEDURE — 99233 SBSQ HOSP IP/OBS HIGH 50: CPT

## 2023-10-15 RX ORDER — FUROSEMIDE 40 MG
1 TABLET ORAL
Refills: 0 | DISCHARGE

## 2023-10-15 RX ORDER — FUROSEMIDE 40 MG
20 TABLET ORAL ONCE
Refills: 0 | Status: COMPLETED | OUTPATIENT
Start: 2023-10-15 | End: 2023-10-15

## 2023-10-15 RX ADMIN — Medication 2: at 18:17

## 2023-10-15 RX ADMIN — CEFTRIAXONE 100 MILLIGRAM(S): 500 INJECTION, POWDER, FOR SOLUTION INTRAMUSCULAR; INTRAVENOUS at 03:03

## 2023-10-15 RX ADMIN — APIXABAN 2.5 MILLIGRAM(S): 2.5 TABLET, FILM COATED ORAL at 06:28

## 2023-10-15 RX ADMIN — AMIODARONE HYDROCHLORIDE 200 MILLIGRAM(S): 400 TABLET ORAL at 06:28

## 2023-10-15 RX ADMIN — Medication 20 MILLIGRAM(S): at 13:15

## 2023-10-15 RX ADMIN — APIXABAN 2.5 MILLIGRAM(S): 2.5 TABLET, FILM COATED ORAL at 17:07

## 2023-10-15 RX ADMIN — ATORVASTATIN CALCIUM 20 MILLIGRAM(S): 80 TABLET, FILM COATED ORAL at 21:59

## 2023-10-15 RX ADMIN — Medication 100 MILLIGRAM(S): at 11:35

## 2023-10-15 NOTE — PROGRESS NOTE ADULT - ASSESSMENT
87M PMH HTN, HLD, HFpEF, Afib (s/p cardioversion), T2D, CKD3 (baseline Cr 1.5-1.7), gout, GERD, GI AVMs (hx bleeds) presenting with productive cough along with dyspnea for the past 1 day. Patient reports he started experiencing a productive cough of yellowish sputum last night which progressively worsened over the past 24 hours, being admitted for sepsis 2/2 CAP  87M PMH HTN, HLD, HFpEF, Afib (s/p cardioversion), T2D, CKD3 (baseline Cr 1.5-1.7), gout, GERD, GI AVMs (hx bleeds) presenting with productive cough along with dyspnea for the past 1 day. Patient reports he started experiencing a productive cough of yellowish sputum last night which progressively worsened over the past 24 hours, being admitted for sepsis 2/2 CAP.

## 2023-10-15 NOTE — PROGRESS NOTE ADULT - PROBLEM SELECTOR PLAN 9
- Holding home meds as normotensive and septic -Home meds: amlodipine 5 mg qd, lisinopril 40 mg qd   Holding home meds as normotensive and septic

## 2023-10-15 NOTE — PROGRESS NOTE ADULT - SUBJECTIVE AND OBJECTIVE BOX
OVERNIGHT EVENTS: CHAVEZ    SUBJECTIVE / INTERVAL HPI: Patient seen and examined at bedside. Reports he is feeling better, decrease in cough and sob. Denies fever, chills, abd pain, n/v/d.     VITAL SIGNS:  Vital Signs Last 24 Hrs  T(C): 36.8 (15 Oct 2023 12:08), Max: 37.2 (14 Oct 2023 16:01)  T(F): 98.2 (15 Oct 2023 12:08), Max: 98.9 (14 Oct 2023 16:01)  HR: 68 (15 Oct 2023 12:08) (61 - 88)  BP: 128/74 (15 Oct 2023 12:08) (116/66 - 140/72)  BP(mean): 84 (14 Oct 2023 20:30) (84 - 88)  RR: 16 (15 Oct 2023 12:08) (16 - 20)  SpO2: 96% (15 Oct 2023 12:08) (94% - 97%)    Parameters below as of 15 Oct 2023 12:08  Patient On (Oxygen Delivery Method): nasal cannula  O2 Flow (L/min): 3      PHYSICAL EXAM:    Head: NC/AT  Eyes: PERRL, EOMI, anicteric sclera  ENT: no nasal discharge; uvula midline, no oropharyngeal erythema or exudates; dry mucous membranes   Neck: supple; no JVD or thyromegaly  Respiratory: mildly decreased breath sides in left lower lung fields, crackles BL  Cardiac: +S1/S2; +murmur, + irregular rhythm, regular rate   Gastrointestinal: abdomen soft, NT/ND; no rebound or guarding; +BSx4  Extremities: WWP, no clubbing or cyanosis; +1 pitting edema  Musculoskeletal: NROM x4; no joint swelling, tenderness or erythema  Vascular: 2+ radial, femoral, DP/PT pulses B/L  Neurologic: AAOx3; CNII-XII grossly intact; no focal deficit    MEDICATIONS:  MEDICATIONS  (STANDING):  allopurinol 100 milliGRAM(s) Oral daily  aMIOdarone    Tablet 200 milliGRAM(s) Oral daily  apixaban 2.5 milliGRAM(s) Oral every 12 hours  atorvastatin 20 milliGRAM(s) Oral at bedtime  cefTRIAXone   IVPB 1000 milliGRAM(s) IV Intermittent every 24 hours  dextrose 5%. 1000 milliLiter(s) (100 mL/Hr) IV Continuous <Continuous>  dextrose 5%. 1000 milliLiter(s) (50 mL/Hr) IV Continuous <Continuous>  dextrose 50% Injectable 12.5 Gram(s) IV Push once  dextrose 50% Injectable 25 Gram(s) IV Push once  dextrose 50% Injectable 25 Gram(s) IV Push once  glucagon  Injectable 1 milliGRAM(s) IntraMuscular once  influenza  Vaccine (HIGH DOSE) 0.7 milliLiter(s) IntraMuscular once  insulin lispro (ADMELOG) corrective regimen sliding scale   SubCutaneous three times a day before meals    MEDICATIONS  (PRN):  acetaminophen     Tablet .. 650 milliGRAM(s) Oral every 6 hours PRN Temp greater or equal to 38C (100.4F), Mild Pain (1 - 3)  aluminum hydroxide/magnesium hydroxide/simethicone Suspension 30 milliLiter(s) Oral every 4 hours PRN Dyspepsia  dextrose Oral Gel 15 Gram(s) Oral once PRN Blood Glucose LESS THAN 70 milliGRAM(s)/deciliter  melatonin 3 milliGRAM(s) Oral at bedtime PRN Insomnia  ondansetron Injectable 4 milliGRAM(s) IV Push every 8 hours PRN Nausea and/or Vomiting      ALLERGIES:  Allergies    sulfa drugs (Unknown)  penicillin (Other (Mild))    Intolerances        LABS:                        13.1   7.77  )-----------( 117      ( 15 Oct 2023 05:30 )             40.9     10-15    140  |  107  |  29<H>  ----------------------------<  111<H>  4.3   |  25  |  1.39<H>    Ca    8.3<L>      15 Oct 2023 05:30  Phos  3.6     10-15  Mg     2.1     10-15    TPro  5.9<L>  /  Alb  3.2<L>  /  TBili  0.9  /  DBili  x   /  AST  14  /  ALT  9<L>  /  AlkPhos  50  10-15    PT/INR - ( 14 Oct 2023 14:40 )   PT: 12.1 sec;   INR: 1.06          PTT - ( 14 Oct 2023 14:40 )  PTT:29.1 sec  Urinalysis Basic - ( 15 Oct 2023 05:30 )    Color: x / Appearance: x / SG: x / pH: x  Gluc: 111 mg/dL / Ketone: x  / Bili: x / Urobili: x   Blood: x / Protein: x / Nitrite: x   Leuk Esterase: x / RBC: x / WBC x   Sq Epi: x / Non Sq Epi: x / Bacteria: x      CAPILLARY BLOOD GLUCOSE      POCT Blood Glucose.: 101 mg/dL (15 Oct 2023 08:54)      RADIOLOGY & ADDITIONAL TESTS: Reviewed.

## 2023-10-15 NOTE — PHYSICAL THERAPY INITIAL EVALUATION ADULT - PLANNED THERAPY INTERVENTIONS, PT EVAL
balance training/bed mobility training/gait training/manual therapy techniques/neuromuscular re-education/ROM/strengthening/stretching/transfer training

## 2023-10-15 NOTE — PHYSICAL THERAPY INITIAL EVALUATION ADULT - ADDITIONAL COMMENTS
Pt lives in an apartment with his wife, no stairs to enter, ambulates with cane at baseline. Pt has rolling walker at home but does not use.

## 2023-10-15 NOTE — DISCHARGE NOTE PROVIDER - NSDCMRMEDTOKEN_GEN_ALL_CORE_FT
allopurinol 100 mg oral tablet: 1 tab(s) orally once a day   amiodarone 200 mg oral tablet: 1 tab(s) orally once a day  amLODIPine 5 mg oral tablet: 1 tab(s) orally once a day  atorvastatin 20 mg oral tablet: 1 tab(s) orally once a day  Eliquis 2.5 mg oral tablet: 1 tab(s) orally every 12 hours  Jardiance 10 mg oral tablet: 1 tab(s) orally once a day (in the morning)  Lasix 20 mg oral tablet: 1 tab(s) orally 2 times a week as needed for pitting edema  lisinopril 40 mg oral tablet: 1 tab(s) orally   allopurinol 100 mg oral tablet: 1 tab(s) orally once a day   amiodarone 200 mg oral tablet: 1 tab(s) orally once a day  amLODIPine 5 mg oral tablet: 1 tab(s) orally once a day  atorvastatin 20 mg oral tablet: 1 tab(s) orally once a day  cefpodoxime 200 mg oral tablet: 1 tab(s) orally 2 times a day  Eliquis 2.5 mg oral tablet: 1 tab(s) orally every 12 hours  Jardiance 10 mg oral tablet: 1 tab(s) orally once a day (in the morning)  Lasix 20 mg oral tablet: 1 tab(s) orally 2 times a week as needed for pitting edema  lisinopril 40 mg oral tablet: 1 tab(s) orally

## 2023-10-15 NOTE — PHYSICAL THERAPY INITIAL EVALUATION ADULT - LEVEL OF INDEPENDENCE: GAIT, REHAB EVAL
Subjective:       Patient ID: Bandar Flores is a 65 y.o. male.    Chief Complaint: Follow-up    Pt is a 65 y.o. male who presents for check up for R knee with Pain. Doing well on current meds. Denies any side effects. Prevention is up to date.    Review of Systems   Constitutional: Negative for appetite change.   HENT: Negative for congestion, ear pain, sneezing and sore throat.    Eyes: Negative for redness and visual disturbance.   Respiratory: Negative for cough, chest tightness and stridor.    Cardiovascular: Negative for chest pain.   Gastrointestinal: Negative for abdominal pain, blood in stool, diarrhea, nausea and vomiting.   Genitourinary: Negative for difficulty urinating, dysuria and hematuria.   Musculoskeletal: Negative for arthralgias, back pain, joint swelling, myalgias and neck pain.   Skin: Positive for wound. Negative for rash.        R 3 cmScalp with some scaly; R great toenail with englargement   Neurological: Negative for dizziness.   Psychiatric/Behavioral: Negative for agitation. The patient is not nervous/anxious.        Objective:      Physical Exam   Constitutional: He is oriented to person, place, and time. He appears well-developed and well-nourished.   HENT:   Head: Normocephalic.   Eyes: Pupils are equal, round, and reactive to light.   Neck: Normal range of motion. Neck supple. No thyromegaly present.   Cardiovascular: Normal rate and regular rhythm. Exam reveals no friction rub.   No murmur heard.  Pulmonary/Chest: Effort normal. No respiratory distress. He has no wheezes.   Abdominal: There is no tenderness. There is no rebound and no guarding.   Musculoskeletal: Normal range of motion. He exhibits no edema or tenderness.   Lymphadenopathy:     He has no cervical adenopathy.   Neurological: He is alert and oriented to person, place, and time. He has normal reflexes. No cranial nerve deficit.   Skin: Skin is warm and dry.   R posterior scalp with a scaly 3 cm lesion    Psychiatric: He has a normal mood and affect. Judgment and thought content normal.       Assessment:       1. Hyperlipidemia, unspecified hyperlipidemia type    2. Primary osteoarthritis of both knees    3. Wellness examination    4. Prostate cancer screening    5. Tinea unguium    6. Primary osteoarthritis of left knee        Plan:   Bandar Morris was seen today for follow-up.    Diagnoses and all orders for this visit:    Hyperlipidemia, unspecified hyperlipidemia type  -     Lipid panel; Future  -     CBC auto differential; Future  -     Comprehensive metabolic panel; Future    Primary osteoarthritis of both knees    Wellness examination    Prostate cancer screening  -     PSA, Screening; Future    Tinea unguium    Primary osteoarthritis of left knee       contact guard

## 2023-10-15 NOTE — DISCHARGE NOTE PROVIDER - NSDCCPCAREPLAN_GEN_ALL_CORE_FT
PRINCIPAL DISCHARGE DIAGNOSIS  Diagnosis: Community acquired pneumonia  Assessment and Plan of Treatment: You were diagnosed with pneumonia, or an infection of the lungs during your admission to [facility]. This was noted based on your symptom profile and findings on chest X-ray. You were treated with antibiotics throughout your hospital course. Please continue to take ___. Please follow-up with your primary care physician when you leave the hospital. Should you experience symptoms such as but not limited to: worsening shortness of breath, wheezing, severe cough, coughing bloody sputum, unrelenting/high fever (>103 F or lasting >3 days), and chest pain, please return to the emergency department for interval evaluation.       PRINCIPAL DISCHARGE DIAGNOSIS  Diagnosis: Community acquired pneumonia  Assessment and Plan of Treatment: You were diagnosed with pneumonia, or an infection of the lungs during your admission to [facility]. This was noted based on your symptom profile and findings on chest X-ray. You were treated with antibiotics throughout your hospital course. Please continue to take your oral antibiotic as directed. Please follow-up with your primary care physician when you leave the hospital. Should you experience symptoms such as but not limited to: worsening shortness of breath, wheezing, severe cough, coughing bloody sputum, unrelenting/high fever (>103 F or lasting >3 days), and chest pain, please return to the emergency department for interval evaluation.

## 2023-10-15 NOTE — DISCHARGE NOTE PROVIDER - ATTENDING DISCHARGE PHYSICAL EXAMINATION:
100 I have read and agree with the resident Discharge Note above.  Patient seen and discussed with resident team on the day of discharge.     Briefly, 88 yo M with Hx of HTN, HLD, HFpEF, Afib (s/p cardioversion), T2D, CKD3 (baseline Cr 1.5-1.7), gout, GERD, GI AVMs (hx bleeds) who presented with productive cough along with dyspnea for the past 1 day found to have sepsis due to CAP.    #Sepsis - POA given WBC > 12, RR > 20 and source of PNA, L basilar infiltrate on CXR  #Community acquired PNA  - Azithro discontinued to lobar infiltrate and legionella negative  - CTX transitioned to Cefpodoxime on DC  - Weaned to RA and ambulatory sats > 92%    #CHFpEF, chronic, not in acute exacerbation  - resume home Lisinopril, PRN Lasix 2x a week for LE edema  - c/w outpatient cards f/u    #Afib, chronic  - c/w Amiodarone and Eliquis. Cards f/u    Attending exam on day of discharge:   Gen: NAD, sitting in bed comfortably  HEENT: NCAT, MMM  Neck: supple, trachea at midline  CV: RRR, no m/g/r appreciated  Pulm: CTA B/L, trace crackles in L base, normal work of breathing  Abd: +BS, soft, NTND  : deferred  Skin: no rashes, ulcers, jaundice; intact, warm and dry  Ext: WWP, no c/c/e  MSK: 5/5 strength  Neuro: AOx3, no change from baseline  Psych: pleasant, conversant and appropriate    I was physically present for the evaluation and management services provided. I agree with the included history, physical, and plan which I reviewed and edited where appropriate. I spent > 30 minutes with the patient and the patient's family on direct patient care and discharge planning with more than 50% of the visit spent on counseling and/or coordination of care.     Jose Alfredo See  [phone] I have read and agree with the resident Discharge Note above.  Patient seen and discussed with resident team on the day of discharge.     Briefly, 86 yo M with Hx of HTN, HLD, HFpEF, Afib (s/p cardioversion), T2D, CKD3 (baseline Cr 1.5-1.7), gout, GERD, GI AVMs (hx bleeds) who presented with productive cough along with dyspnea for the past 1 day found to have sepsis due to CAP.    #Sepsis - POA given WBC > 12, RR > 20 and source of PNA, L basilar infiltrate on CXR  #Acute hypoxic respiratory failure  #Community acquired PNA  - Azithro discontinued to lobar infiltrate and legionella negative  - CTX transitioned to Cefpodoxime on DC  - Weaned to RA and ambulatory sats > 92%    #CHFpEF, chronic, not in acute exacerbation  - resume home Lisinopril, PRN Lasix 2x a week for LE edema  - c/w outpatient cards f/u    #Afib, chronic  - c/w Amiodarone and Eliquis. Cards f/u    Attending exam on day of discharge:   Gen: NAD, sitting in bed comfortably  HEENT: NCAT, MMM  Neck: supple, trachea at midline  CV: RRR, no m/g/r appreciated  Pulm: CTA B/L, trace crackles in L base, normal work of breathing  Abd: +BS, soft, NTND  : deferred  Skin: no rashes, ulcers, jaundice; intact, warm and dry  Ext: WWP, no c/c/e  MSK: 5/5 strength  Neuro: AOx3, no change from baseline  Psych: pleasant, conversant and appropriate    I was physically present for the evaluation and management services provided. I agree with the included history, physical, and plan which I reviewed and edited where appropriate. I spent > 30 minutes with the patient and the patient's family on direct patient care and discharge planning with more than 50% of the visit spent on counseling and/or coordination of care.     Jose Alfredo See  [phone]

## 2023-10-15 NOTE — PHYSICAL THERAPY INITIAL EVALUATION ADULT - LEVEL OF INDEPENDENCE: STAND/SIT, REHAB EVAL
contact guard Isotretinoin Counseling: Patient should get monthly blood tests, not donate blood, not drive at night if vision affected, not share medication, and not undergo elective surgery for 6 months after tx completed. Side effects reviewed, pt to contact office should one occur.

## 2023-10-15 NOTE — DISCHARGE NOTE PROVIDER - NSDCCPTREATMENT_GEN_ALL_CORE_FT
PRINCIPAL PROCEDURE  Procedure: XR chest AP  Findings and Treatment: Frontal examination of the chest demonstrates mild cardiomegaly. Left   basilar infiltrate. Elevation right hemidiaphragm. Degenerative changes   thoracic spine. Calcification aortic knob.

## 2023-10-15 NOTE — PROGRESS NOTE ADULT - PROBLEM SELECTOR PLAN 7
Euvolemic on exam other than trace bilateral LE edema     - can consider lasix 20 mg PO/IV prn Euvolemic on exam other than trace bilateral LE edema     - s/p lasix 20 mg IV   -continue to monitor

## 2023-10-15 NOTE — PROGRESS NOTE ADULT - PROBLEM SELECTOR PLAN 5
Home meds: Januvia   - Follow up a1c 6.6 (10/15)  - Continue ISS Home meds: Jardiance 10 mg qd  - Follow up a1c 6.6 (10/15)  - Continue ISS

## 2023-10-16 ENCOUNTER — TRANSCRIPTION ENCOUNTER (OUTPATIENT)
Age: 88
End: 2023-10-16

## 2023-10-16 VITALS
OXYGEN SATURATION: 93 % | TEMPERATURE: 98 F | HEART RATE: 64 BPM | RESPIRATION RATE: 18 BRPM | DIASTOLIC BLOOD PRESSURE: 75 MMHG | SYSTOLIC BLOOD PRESSURE: 142 MMHG

## 2023-10-16 LAB
ALBUMIN SERPL ELPH-MCNC: 3.5 G/DL — SIGNIFICANT CHANGE UP (ref 3.3–5)
ALP SERPL-CCNC: 60 U/L — SIGNIFICANT CHANGE UP (ref 40–120)
ALT FLD-CCNC: 11 U/L — SIGNIFICANT CHANGE UP (ref 10–45)
ANION GAP SERPL CALC-SCNC: 10 MMOL/L — SIGNIFICANT CHANGE UP (ref 5–17)
AST SERPL-CCNC: 14 U/L — SIGNIFICANT CHANGE UP (ref 10–40)
BILIRUB SERPL-MCNC: 0.7 MG/DL — SIGNIFICANT CHANGE UP (ref 0.2–1.2)
BUN SERPL-MCNC: 31 MG/DL — HIGH (ref 7–23)
CALCIUM SERPL-MCNC: 8.7 MG/DL — SIGNIFICANT CHANGE UP (ref 8.4–10.5)
CHLORIDE SERPL-SCNC: 105 MMOL/L — SIGNIFICANT CHANGE UP (ref 96–108)
CO2 SERPL-SCNC: 23 MMOL/L — SIGNIFICANT CHANGE UP (ref 22–31)
CREAT SERPL-MCNC: 1.4 MG/DL — HIGH (ref 0.5–1.3)
EGFR: 48 ML/MIN/1.73M2 — LOW
GLUCOSE BLDC GLUCOMTR-MCNC: 122 MG/DL — HIGH (ref 70–99)
GLUCOSE SERPL-MCNC: 226 MG/DL — HIGH (ref 70–99)
HCT VFR BLD CALC: 45.6 % — SIGNIFICANT CHANGE UP (ref 39–50)
HGB BLD-MCNC: 14.3 G/DL — SIGNIFICANT CHANGE UP (ref 13–17)
MAGNESIUM SERPL-MCNC: 2 MG/DL — SIGNIFICANT CHANGE UP (ref 1.6–2.6)
MCHC RBC-ENTMCNC: 31.4 GM/DL — LOW (ref 32–36)
MCHC RBC-ENTMCNC: 31.7 PG — SIGNIFICANT CHANGE UP (ref 27–34)
MCV RBC AUTO: 101.1 FL — HIGH (ref 80–100)
NRBC # BLD: 0 /100 WBCS — SIGNIFICANT CHANGE UP (ref 0–0)
PHOSPHATE SERPL-MCNC: 3.2 MG/DL — SIGNIFICANT CHANGE UP (ref 2.5–4.5)
PLATELET # BLD AUTO: 138 K/UL — LOW (ref 150–400)
POTASSIUM SERPL-MCNC: 3.8 MMOL/L — SIGNIFICANT CHANGE UP (ref 3.5–5.3)
POTASSIUM SERPL-SCNC: 3.8 MMOL/L — SIGNIFICANT CHANGE UP (ref 3.5–5.3)
PROT SERPL-MCNC: 6.9 G/DL — SIGNIFICANT CHANGE UP (ref 6–8.3)
RBC # BLD: 4.51 M/UL — SIGNIFICANT CHANGE UP (ref 4.2–5.8)
RBC # FLD: 17.1 % — HIGH (ref 10.3–14.5)
SODIUM SERPL-SCNC: 138 MMOL/L — SIGNIFICANT CHANGE UP (ref 135–145)
WBC # BLD: 6.24 K/UL — SIGNIFICANT CHANGE UP (ref 3.8–10.5)
WBC # FLD AUTO: 6.24 K/UL — SIGNIFICANT CHANGE UP (ref 3.8–10.5)

## 2023-10-16 PROCEDURE — 83735 ASSAY OF MAGNESIUM: CPT

## 2023-10-16 PROCEDURE — 96374 THER/PROPH/DIAG INJ IV PUSH: CPT

## 2023-10-16 PROCEDURE — 85610 PROTHROMBIN TIME: CPT

## 2023-10-16 PROCEDURE — 85379 FIBRIN DEGRADATION QUANT: CPT

## 2023-10-16 PROCEDURE — 99285 EMERGENCY DEPT VISIT HI MDM: CPT

## 2023-10-16 PROCEDURE — 80053 COMPREHEN METABOLIC PANEL: CPT

## 2023-10-16 PROCEDURE — 87449 NOS EACH ORGANISM AG IA: CPT

## 2023-10-16 PROCEDURE — 85730 THROMBOPLASTIN TIME PARTIAL: CPT

## 2023-10-16 PROCEDURE — 93005 ELECTROCARDIOGRAM TRACING: CPT

## 2023-10-16 PROCEDURE — 99239 HOSP IP/OBS DSCHRG MGMT >30: CPT

## 2023-10-16 PROCEDURE — 82962 GLUCOSE BLOOD TEST: CPT

## 2023-10-16 PROCEDURE — 97161 PT EVAL LOW COMPLEX 20 MIN: CPT

## 2023-10-16 PROCEDURE — 0225U NFCT DS DNA&RNA 21 SARSCOV2: CPT

## 2023-10-16 PROCEDURE — 87899 AGENT NOS ASSAY W/OPTIC: CPT

## 2023-10-16 PROCEDURE — 84145 PROCALCITONIN (PCT): CPT

## 2023-10-16 PROCEDURE — 81003 URINALYSIS AUTO W/O SCOPE: CPT

## 2023-10-16 PROCEDURE — 85025 COMPLETE CBC W/AUTO DIFF WBC: CPT

## 2023-10-16 PROCEDURE — 85027 COMPLETE CBC AUTOMATED: CPT

## 2023-10-16 PROCEDURE — 71045 X-RAY EXAM CHEST 1 VIEW: CPT

## 2023-10-16 PROCEDURE — 83605 ASSAY OF LACTIC ACID: CPT

## 2023-10-16 PROCEDURE — 84100 ASSAY OF PHOSPHORUS: CPT

## 2023-10-16 PROCEDURE — 83036 HEMOGLOBIN GLYCOSYLATED A1C: CPT

## 2023-10-16 PROCEDURE — 36415 COLL VENOUS BLD VENIPUNCTURE: CPT

## 2023-10-16 PROCEDURE — 87040 BLOOD CULTURE FOR BACTERIA: CPT

## 2023-10-16 RX ORDER — LISINOPRIL 2.5 MG/1
40 TABLET ORAL DAILY
Refills: 0 | Status: DISCONTINUED | OUTPATIENT
Start: 2023-10-16 | End: 2023-10-16

## 2023-10-16 RX ORDER — AMLODIPINE BESYLATE 2.5 MG/1
5 TABLET ORAL DAILY
Refills: 0 | Status: DISCONTINUED | OUTPATIENT
Start: 2023-10-16 | End: 2023-10-16

## 2023-10-16 RX ORDER — CEFPODOXIME PROXETIL 100 MG
1 TABLET ORAL
Qty: 6 | Refills: 0
Start: 2023-10-16 | End: 2023-10-18

## 2023-10-16 RX ADMIN — Medication 100 MILLIGRAM(S): at 12:16

## 2023-10-16 RX ADMIN — AMLODIPINE BESYLATE 5 MILLIGRAM(S): 2.5 TABLET ORAL at 10:34

## 2023-10-16 RX ADMIN — APIXABAN 2.5 MILLIGRAM(S): 2.5 TABLET, FILM COATED ORAL at 06:42

## 2023-10-16 RX ADMIN — AMIODARONE HYDROCHLORIDE 200 MILLIGRAM(S): 400 TABLET ORAL at 06:42

## 2023-10-16 RX ADMIN — CEFTRIAXONE 100 MILLIGRAM(S): 500 INJECTION, POWDER, FOR SOLUTION INTRAMUSCULAR; INTRAVENOUS at 03:38

## 2023-10-16 NOTE — PROGRESS NOTE ADULT - PROBLEM SELECTOR PLAN 3
-urine leg/Strep neg  - Dc'd azithromycin  -c/w CTX   - If no improvement then consider MRSA swab, however no risk factors for MRSA at this time
-urine leg/Strep neg  - Dc'd azithromycin  -c/w CTX, d/c on po abx

## 2023-10-16 NOTE — PROGRESS NOTE ADULT - PROBLEM SELECTOR PLAN 11
F: none  E: replete PRN  DVT PPx: eliquis  GI PPx: none  Code: FULL  Dispo:
F: none  E: replete PRN  DVT PPx: eliquis  GI PPx: none  Code: FULL  Dispo:

## 2023-10-16 NOTE — PROGRESS NOTE ADULT - PROBLEM SELECTOR PLAN 1
Met 2/4 sepsis criteria for RR>20 and WBC>12  Most likely in the setting of infectious process, concern for CAP.  No urinary, GI symptoms, no new skin findings.     - Follow up blood culture-NTD  - CAP plan as below
Met 2/4 sepsis criteria for RR>20 and WBC>12  Most likely in the setting of infectious process, concern for CAP.  No urinary, GI symptoms, no new skin findings.     - Follow up blood culture-NTD  - CAP plan as below

## 2023-10-16 NOTE — PROGRESS NOTE ADULT - PROBLEM SELECTOR PLAN 7
Euvolemic on exam other than trace bilateral LE edema     - s/p lasix 20 mg IV   -continue to monitor

## 2023-10-16 NOTE — PROGRESS NOTE ADULT - PROBLEM SELECTOR PLAN 4
- Continue amiodarone 200 mg qd  - COntinue eliquis 2.5 mg bid
- Continue amiodarone 200 mg qd  - COntinue eliquis 2.5 mg bid

## 2023-10-16 NOTE — CONSULT NOTE ADULT - SUBJECTIVE AND OBJECTIVE BOX
Patient is a 89y old  Male who presents with a chief complaint of cough, dyspnea (15 Oct 2023 18:37)      HPI:  87M PMH HTN, HLD, HFpEF, Afib (s/p cardioversion), T2D, CKD3 (baseline Cr 1.5-1.7), gout, GERD, GI AVMs (hx bleeds) presenting with productive cough along with dyspnea for the past 1 day. Patient reports he started experiencing a productive cough of yellowish sputum last night which progressively worsened over the past 24 hours. He reports some dyspnea since this morning, but noticeable improvement after being placed on oxygen. He denies any fever or chills, any sick contacts. He has not had any recent hospitalizations. On ROS, patient reports fatigue.     In the ED, patient afebrile, normotensive, tachypneic to 22 bpm, satting 97% on 2L NC. WBC 12.81 with neutrophilic predominance. D-dimer neg. Lactate 1.8. Cr 1.54. RVP neg. EKG with Afib with LBBB (unchanged compared to previous). CXR with left lower lobe infiltrates.  (14 Oct 2023 15:49)    PAST MEDICAL & SURGICAL HISTORY:  Gout      Afib      HTN (hypertension)      HLD (hyperlipidemia)      CHF (congestive heart failure)      Chronic kidney disease (CKD)      GERD (gastroesophageal reflux disease)      Diabetes      AVM (arteriovenous malformation)      History of appendectomy  ~1961      History of partial colectomy  ~1996, performed for diverticulitis        MEDICATIONS  (STANDING):  allopurinol 100 milliGRAM(s) Oral daily  aMIOdarone    Tablet 200 milliGRAM(s) Oral daily  amLODIPine   Tablet 5 milliGRAM(s) Oral daily  apixaban 2.5 milliGRAM(s) Oral every 12 hours  atorvastatin 20 milliGRAM(s) Oral at bedtime  cefTRIAXone   IVPB 1000 milliGRAM(s) IV Intermittent every 24 hours  dextrose 5%. 1000 milliLiter(s) (100 mL/Hr) IV Continuous <Continuous>  dextrose 5%. 1000 milliLiter(s) (50 mL/Hr) IV Continuous <Continuous>  dextrose 50% Injectable 12.5 Gram(s) IV Push once  dextrose 50% Injectable 25 Gram(s) IV Push once  dextrose 50% Injectable 25 Gram(s) IV Push once  glucagon  Injectable 1 milliGRAM(s) IntraMuscular once  influenza  Vaccine (HIGH DOSE) 0.7 milliLiter(s) IntraMuscular once  insulin lispro (ADMELOG) corrective regimen sliding scale   SubCutaneous Before meals and at bedtime  lisinopril 40 milliGRAM(s) Oral daily    MEDICATIONS  (PRN):  acetaminophen     Tablet .. 650 milliGRAM(s) Oral every 6 hours PRN Temp greater or equal to 38C (100.4F), Mild Pain (1 - 3)  aluminum hydroxide/magnesium hydroxide/simethicone Suspension 30 milliLiter(s) Oral every 4 hours PRN Dyspepsia  dextrose Oral Gel 15 Gram(s) Oral once PRN Blood Glucose LESS THAN 70 milliGRAM(s)/deciliter  melatonin 3 milliGRAM(s) Oral at bedtime PRN Insomnia  ondansetron Injectable 4 milliGRAM(s) IV Push every 8 hours PRN Nausea and/or Vomiting               Home Living Status :  lives with his wife in an elevator accessible apartment building          -  Prior Home Care Services :  none     Baseline Functional Level Prior to Admission :             - ADL's/ IADL's :  independent          - Ambulatory status prior to admission :   walked with a rolling walker          FAMILY HISTORY:      CBC Full  -  ( 16 Oct 2023 11:43 )  WBC Count : 6.24 K/uL  RBC Count : 4.51 M/uL  Hemoglobin : 14.3 g/dL  Hematocrit : 45.6 %  Platelet Count - Automated : 138 K/uL  Mean Cell Volume : 101.1 fl  Mean Cell Hemoglobin : 31.7 pg  Mean Cell Hemoglobin Concentration : 31.4 gm/dL  Auto Neutrophil # : x  Auto Lymphocyte # : x  Auto Monocyte # : x  Auto Eosinophil # : x  Auto Basophil # : x  Auto Neutrophil % : x  Auto Lymphocyte % : x  Auto Monocyte % : x  Auto Eosinophil % : x  Auto Basophil % : x      10-16    138  |  105  |  31<H>  ----------------------------<  226<H>  3.8   |  23  |  1.40<H>    Ca    8.7      16 Oct 2023 11:43  Phos  3.2     10-16  Mg     2.0     10-16    TPro  6.9  /  Alb  3.5  /  TBili  0.7  /  DBili  x   /  AST  14  /  ALT  11  /  AlkPhos  60  10-16      Urinalysis Basic - ( 16 Oct 2023 11:43 )    Color: x / Appearance: x / SG: x / pH: x  Gluc: 226 mg/dL / Ketone: x  / Bili: x / Urobili: x   Blood: x / Protein: x / Nitrite: x   Leuk Esterase: x / RBC: x / WBC x   Sq Epi: x / Non Sq Epi: x / Bacteria: x        Radiology :     < from: Xray Chest 1 View-PORTABLE IMMEDIATE (10.14.23 @ 13:37) >    ACC: [id number] EXAM:  XR CHEST PORTABLE IMMED 1V   ORDERED BY: MIRNA DENNY     PROCEDURE DATE:  10/14/2023          INTERPRETATION:  Clinical History: Sepsis    Frontal examination of the chest demonstrates mild cardiomegaly. Left   basilar infiltrate. Elevation right hemidiaphragm. Degenerative changes   thoracic spine. Calcification aortic knob.    IMPRESSION: Left basilar infiltrate    < end of copied text >          Review of Systems : per HPI         Vital Signs Last 24 Hrs  T(C): 36.6 (16 Oct 2023 12:20), Max: 37.1 (15 Oct 2023 20:40)  T(F): 97.8 (16 Oct 2023 12:20), Max: 98.8 (15 Oct 2023 20:40)  HR: 64 (16 Oct 2023 12:20) (64 - 79)  BP: 142/75 (16 Oct 2023 12:20) (123/72 - 156/84)  BP(mean): --  RR: 18 (16 Oct 2023 12:20) (17 - 20)  SpO2: 93% (16 Oct 2023 12:20) (92% - 96%)    Parameters below as of 16 Oct 2023 10:41  Patient On (Oxygen Delivery Method): room air            Physical Exam :  89 y o man lying comfortably in semi Drake's position , awake , alert , no acute complaints     Head : normocephalic , atraumatic    Eyes : PERRLA , EOMI , no nystagmus , sclera anicteric    ENT / FACE : neg nasal discharge , uvula midline , no oropharyngeal erythema / exudate    Neck : supple , negative JVD , negative carotid bruits , no thyromegaly    Chest : crackles L base    Cardiovascular : irregular rate and rhythm , III/VI systolic murmur     Abdomen : soft , non distended , non tender to palpation in all 4 quadrants ,  normal bowel sounds     Extremities : WWP , neg cyanosis /clubbing / edema     Neurologic Exam :     Alert and oriented  x  3        Motor Exam:                Right UE:                     no focal weakness ,  > 3+/5 throughout      Left UE:                       no focal weakness ,  > 3+/5 throughout          Right LE:          no focal weakness ,  > 3+/5 throughout          Left LE:          no focal weakness ,  > 3+/5 throughout           Sensation :         intact to light touch x 4 extremities     DTR :           biceps/brachioradialis : equal                            patella/ankle : equal       Gait :  not tested          PM&R Impression : admitted for sepsis/ PNA      - deconditioned    - no focal weakness           Recommendations / Plan :       1) Physical / Occupational therapy focusing on therapeutic exercises , equipment evaluation , bed mobility/transfer out of bed evaluation , progressive ambulation with assistive devices prn .    2) Current disposition plan recommendation  :      d/c home with home physical therapy

## 2023-10-16 NOTE — PROGRESS NOTE ADULT - PROBLEM SELECTOR PLAN 6
At baseline on arrival    - Avoid nephrotoxic meds
At baseline on arrival    - Avoid nephrotoxic meds

## 2023-10-16 NOTE — PROGRESS NOTE ADULT - PROBLEM SELECTOR PLAN 2
Found hypoxemic on arrival.  Found with CAP based on symptoms and radiologic findings.   - Low concern for PE, currently on full AC, low d-dimer  - CAP plan as below
Found hypoxemic on arrival.  Found with CAP based on symptoms and radiologic findings.   - Low concern for PE, currently on full AC, low d-dimer  - CAP plan as below

## 2023-10-16 NOTE — DISCHARGE NOTE NURSING/CASE MANAGEMENT/SOCIAL WORK - NSDCVIVACCINE_GEN_ALL_CORE_FT
Tdap; 27-May-2022 11:42; Kuldeep Reyna (RN); [employer]; [id number]     (Exp. Date: 18-Apr-2024); IntraMuscular; Deltoid Left.; 0.5 milliLiter(s); VIS (VIS Published: 09-May-2013, VIS Presented: 27-May-2022);

## 2023-10-16 NOTE — PROGRESS NOTE ADULT - SUBJECTIVE AND OBJECTIVE BOX
OVERNIGHT EVENTS:    SUBJECTIVE / INTERVAL HPI: Patient seen and examined at bedside.     VITAL SIGNS:  Vital Signs Last 24 Hrs  T(C): 36.7 (16 Oct 2023 06:48), Max: 37.1 (15 Oct 2023 20:40)  T(F): 98 (16 Oct 2023 06:48), Max: 98.8 (15 Oct 2023 20:40)  HR: 79 (16 Oct 2023 10:41) (68 - 79)  BP: 143/81 (16 Oct 2023 10:33) (123/72 - 156/84)  BP(mean): --  RR: 20 (16 Oct 2023 10:41) (16 - 20)  SpO2: 92% (16 Oct 2023 10:41) (92% - 96%)    Parameters below as of 16 Oct 2023 10:41  Patient On (Oxygen Delivery Method): room air        PHYSICAL EXAM:    General: NAD  HEENT: NC/AT; PERRL, anicteric sclera; MMM  Neck: supple w/o palpable nodularity  Cardiovascular: +S1/S2; RRR  Respiratory: CTA B/L; no W/R/R  Gastrointestinal: soft, NT/ND; +BSx4  Extremities: WWP; no edema, clubbing or cyanosis  Vascular: 2+ radial, DP/PT pulses B/L  Neurological: AAOx3; no focal deficits    MEDICATIONS:  MEDICATIONS  (STANDING):  allopurinol 100 milliGRAM(s) Oral daily  aMIOdarone    Tablet 200 milliGRAM(s) Oral daily  amLODIPine   Tablet 5 milliGRAM(s) Oral daily  apixaban 2.5 milliGRAM(s) Oral every 12 hours  atorvastatin 20 milliGRAM(s) Oral at bedtime  cefTRIAXone   IVPB 1000 milliGRAM(s) IV Intermittent every 24 hours  dextrose 5%. 1000 milliLiter(s) (100 mL/Hr) IV Continuous <Continuous>  dextrose 5%. 1000 milliLiter(s) (50 mL/Hr) IV Continuous <Continuous>  dextrose 50% Injectable 12.5 Gram(s) IV Push once  dextrose 50% Injectable 25 Gram(s) IV Push once  dextrose 50% Injectable 25 Gram(s) IV Push once  glucagon  Injectable 1 milliGRAM(s) IntraMuscular once  influenza  Vaccine (HIGH DOSE) 0.7 milliLiter(s) IntraMuscular once  insulin lispro (ADMELOG) corrective regimen sliding scale   SubCutaneous Before meals and at bedtime  lisinopril 40 milliGRAM(s) Oral daily    MEDICATIONS  (PRN):  acetaminophen     Tablet .. 650 milliGRAM(s) Oral every 6 hours PRN Temp greater or equal to 38C (100.4F), Mild Pain (1 - 3)  aluminum hydroxide/magnesium hydroxide/simethicone Suspension 30 milliLiter(s) Oral every 4 hours PRN Dyspepsia  dextrose Oral Gel 15 Gram(s) Oral once PRN Blood Glucose LESS THAN 70 milliGRAM(s)/deciliter  melatonin 3 milliGRAM(s) Oral at bedtime PRN Insomnia  ondansetron Injectable 4 milliGRAM(s) IV Push every 8 hours PRN Nausea and/or Vomiting      ALLERGIES:  Allergies    sulfa drugs (Unknown)  penicillin (Other (Mild))    Intolerances        LABS:                        13.1   7.77  )-----------( 117      ( 15 Oct 2023 05:30 )             40.9     10-15    140  |  107  |  29<H>  ----------------------------<  111<H>  4.3   |  25  |  1.39<H>    Ca    8.3<L>      15 Oct 2023 05:30  Phos  3.6     10-15  Mg     2.1     10-15    TPro  5.9<L>  /  Alb  3.2<L>  /  TBili  0.9  /  DBili  x   /  AST  14  /  ALT  9<L>  /  AlkPhos  50  10-15    PT/INR - ( 14 Oct 2023 14:40 )   PT: 12.1 sec;   INR: 1.06          PTT - ( 14 Oct 2023 14:40 )  PTT:29.1 sec  Urinalysis Basic - ( 15 Oct 2023 05:30 )    Color: x / Appearance: x / SG: x / pH: x  Gluc: 111 mg/dL / Ketone: x  / Bili: x / Urobili: x   Blood: x / Protein: x / Nitrite: x   Leuk Esterase: x / RBC: x / WBC x   Sq Epi: x / Non Sq Epi: x / Bacteria: x      CAPILLARY BLOOD GLUCOSE      POCT Blood Glucose.: 122 mg/dL (16 Oct 2023 09:06)      RADIOLOGY & ADDITIONAL TESTS: Reviewed.   OVERNIGHT EVENTS: CHAVEZ    SUBJECTIVE / INTERVAL HPI: Patient seen and examined at bedside. Reports he is feeling much better today, cough has subsided. Denies fever, chills, abd pain, n/v/d.     VITAL SIGNS:  Vital Signs Last 24 Hrs  T(C): 36.7 (16 Oct 2023 06:48), Max: 37.1 (15 Oct 2023 20:40)  T(F): 98 (16 Oct 2023 06:48), Max: 98.8 (15 Oct 2023 20:40)  HR: 79 (16 Oct 2023 10:41) (68 - 79)  BP: 143/81 (16 Oct 2023 10:33) (123/72 - 156/84)  BP(mean): --  RR: 20 (16 Oct 2023 10:41) (16 - 20)  SpO2: 92% (16 Oct 2023 10:41) (92% - 96%)    Parameters below as of 16 Oct 2023 10:41  Patient On (Oxygen Delivery Method): room air        PHYSICAL EXAM:    Head: NC/AT  Eyes: PERRL, EOMI, anicteric sclera  ENT: no nasal discharge; uvula midline, no oropharyngeal erythema or exudates; dry mucous membranes   Neck: supple; no JVD or thyromegaly  Respiratory: mildly decreased breath sides in left lower lung fields, crackles BL  Cardiac: +S1/S2; +murmur, + irregular rhythm, regular rate   Gastrointestinal: abdomen soft, NT/ND; no rebound or guarding; +BSx4  Extremities: WWP, no clubbing or cyanosis; +1 pitting edema  Musculoskeletal: NROM x4; no joint swelling, tenderness or erythema  Vascular: 2+ radial, femoral, DP/PT pulses B/L  Neurologic: AAOx3; CNII-XII grossly intact; no focal deficit    MEDICATIONS:  MEDICATIONS  (STANDING):  allopurinol 100 milliGRAM(s) Oral daily  aMIOdarone    Tablet 200 milliGRAM(s) Oral daily  amLODIPine   Tablet 5 milliGRAM(s) Oral daily  apixaban 2.5 milliGRAM(s) Oral every 12 hours  atorvastatin 20 milliGRAM(s) Oral at bedtime  cefTRIAXone   IVPB 1000 milliGRAM(s) IV Intermittent every 24 hours  dextrose 5%. 1000 milliLiter(s) (100 mL/Hr) IV Continuous <Continuous>  dextrose 5%. 1000 milliLiter(s) (50 mL/Hr) IV Continuous <Continuous>  dextrose 50% Injectable 12.5 Gram(s) IV Push once  dextrose 50% Injectable 25 Gram(s) IV Push once  dextrose 50% Injectable 25 Gram(s) IV Push once  glucagon  Injectable 1 milliGRAM(s) IntraMuscular once  influenza  Vaccine (HIGH DOSE) 0.7 milliLiter(s) IntraMuscular once  insulin lispro (ADMELOG) corrective regimen sliding scale   SubCutaneous Before meals and at bedtime  lisinopril 40 milliGRAM(s) Oral daily    MEDICATIONS  (PRN):  acetaminophen     Tablet .. 650 milliGRAM(s) Oral every 6 hours PRN Temp greater or equal to 38C (100.4F), Mild Pain (1 - 3)  aluminum hydroxide/magnesium hydroxide/simethicone Suspension 30 milliLiter(s) Oral every 4 hours PRN Dyspepsia  dextrose Oral Gel 15 Gram(s) Oral once PRN Blood Glucose LESS THAN 70 milliGRAM(s)/deciliter  melatonin 3 milliGRAM(s) Oral at bedtime PRN Insomnia  ondansetron Injectable 4 milliGRAM(s) IV Push every 8 hours PRN Nausea and/or Vomiting      ALLERGIES:  Allergies    sulfa drugs (Unknown)  penicillin (Other (Mild))    Intolerances        LABS:                        13.1   7.77  )-----------( 117      ( 15 Oct 2023 05:30 )             40.9     10-15    140  |  107  |  29<H>  ----------------------------<  111<H>  4.3   |  25  |  1.39<H>    Ca    8.3<L>      15 Oct 2023 05:30  Phos  3.6     10-15  Mg     2.1     10-15    TPro  5.9<L>  /  Alb  3.2<L>  /  TBili  0.9  /  DBili  x   /  AST  14  /  ALT  9<L>  /  AlkPhos  50  10-15    PT/INR - ( 14 Oct 2023 14:40 )   PT: 12.1 sec;   INR: 1.06          PTT - ( 14 Oct 2023 14:40 )  PTT:29.1 sec  Urinalysis Basic - ( 15 Oct 2023 05:30 )    Color: x / Appearance: x / SG: x / pH: x  Gluc: 111 mg/dL / Ketone: x  / Bili: x / Urobili: x   Blood: x / Protein: x / Nitrite: x   Leuk Esterase: x / RBC: x / WBC x   Sq Epi: x / Non Sq Epi: x / Bacteria: x      CAPILLARY BLOOD GLUCOSE      POCT Blood Glucose.: 122 mg/dL (16 Oct 2023 09:06)      RADIOLOGY & ADDITIONAL TESTS: Reviewed.   OVERNIGHT EVENTS: CHAVEZ    SUBJECTIVE / INTERVAL HPI: Patient seen and examined at bedside. Reports he is feeling much better today, cough has subsided. Denies fever, chills, abd pain, n/v/d.     VITAL SIGNS:  Vital Signs Last 24 Hrs  T(C): 36.7 (16 Oct 2023 06:48), Max: 37.1 (15 Oct 2023 20:40)  T(F): 98 (16 Oct 2023 06:48), Max: 98.8 (15 Oct 2023 20:40)  HR: 79 (16 Oct 2023 10:41) (68 - 79)  BP: 143/81 (16 Oct 2023 10:33) (123/72 - 156/84)  BP(mean): --  RR: 20 (16 Oct 2023 10:41) (16 - 20)  SpO2: 92% (16 Oct 2023 10:41) (92% - 96%)    Parameters below as of 16 Oct 2023 10:41  Patient On (Oxygen Delivery Method): room air        PHYSICAL EXAM:    Head: NC/AT  Eyes: PERRL, EOMI, anicteric sclera  ENT: no nasal discharge; uvula midline, no oropharyngeal erythema or exudates; dry mucous membranes   Neck: supple; no JVD or thyromegaly  Respiratory: mildly decreased breath sides in left lower lung fields  Cardiac: +S1/S2; +murmur, + irregular rhythm, regular rate   Gastrointestinal: abdomen soft, NT/ND; no rebound or guarding; +BSx4  Extremities: WWP, no clubbing or cyanosis; +1 pitting edema  Musculoskeletal: NROM x4; no joint swelling, tenderness or erythema  Vascular: 2+ radial, femoral, DP/PT pulses B/L  Neurologic: AAOx3; CNII-XII grossly intact; no focal deficit    MEDICATIONS:  MEDICATIONS  (STANDING):  allopurinol 100 milliGRAM(s) Oral daily  aMIOdarone    Tablet 200 milliGRAM(s) Oral daily  amLODIPine   Tablet 5 milliGRAM(s) Oral daily  apixaban 2.5 milliGRAM(s) Oral every 12 hours  atorvastatin 20 milliGRAM(s) Oral at bedtime  cefTRIAXone   IVPB 1000 milliGRAM(s) IV Intermittent every 24 hours  dextrose 5%. 1000 milliLiter(s) (100 mL/Hr) IV Continuous <Continuous>  dextrose 5%. 1000 milliLiter(s) (50 mL/Hr) IV Continuous <Continuous>  dextrose 50% Injectable 12.5 Gram(s) IV Push once  dextrose 50% Injectable 25 Gram(s) IV Push once  dextrose 50% Injectable 25 Gram(s) IV Push once  glucagon  Injectable 1 milliGRAM(s) IntraMuscular once  influenza  Vaccine (HIGH DOSE) 0.7 milliLiter(s) IntraMuscular once  insulin lispro (ADMELOG) corrective regimen sliding scale   SubCutaneous Before meals and at bedtime  lisinopril 40 milliGRAM(s) Oral daily    MEDICATIONS  (PRN):  acetaminophen     Tablet .. 650 milliGRAM(s) Oral every 6 hours PRN Temp greater or equal to 38C (100.4F), Mild Pain (1 - 3)  aluminum hydroxide/magnesium hydroxide/simethicone Suspension 30 milliLiter(s) Oral every 4 hours PRN Dyspepsia  dextrose Oral Gel 15 Gram(s) Oral once PRN Blood Glucose LESS THAN 70 milliGRAM(s)/deciliter  melatonin 3 milliGRAM(s) Oral at bedtime PRN Insomnia  ondansetron Injectable 4 milliGRAM(s) IV Push every 8 hours PRN Nausea and/or Vomiting      ALLERGIES:  Allergies    sulfa drugs (Unknown)  penicillin (Other (Mild))    Intolerances        LABS:                        13.1   7.77  )-----------( 117      ( 15 Oct 2023 05:30 )             40.9     10-15    140  |  107  |  29<H>  ----------------------------<  111<H>  4.3   |  25  |  1.39<H>    Ca    8.3<L>      15 Oct 2023 05:30  Phos  3.6     10-15  Mg     2.1     10-15    TPro  5.9<L>  /  Alb  3.2<L>  /  TBili  0.9  /  DBili  x   /  AST  14  /  ALT  9<L>  /  AlkPhos  50  10-15    PT/INR - ( 14 Oct 2023 14:40 )   PT: 12.1 sec;   INR: 1.06          PTT - ( 14 Oct 2023 14:40 )  PTT:29.1 sec  Urinalysis Basic - ( 15 Oct 2023 05:30 )    Color: x / Appearance: x / SG: x / pH: x  Gluc: 111 mg/dL / Ketone: x  / Bili: x / Urobili: x   Blood: x / Protein: x / Nitrite: x   Leuk Esterase: x / RBC: x / WBC x   Sq Epi: x / Non Sq Epi: x / Bacteria: x      CAPILLARY BLOOD GLUCOSE      POCT Blood Glucose.: 122 mg/dL (16 Oct 2023 09:06)      RADIOLOGY & ADDITIONAL TESTS: Reviewed.

## 2023-10-16 NOTE — DISCHARGE NOTE NURSING/CASE MANAGEMENT/SOCIAL WORK - PATIENT PORTAL LINK FT
You can access the FollowMyHealth Patient Portal offered by [facility] by registering at the following website: [email or web address]. By joining [facility]’s FollowMyHealth portal, you will also be able to view your health information using other applications (apps) compatible with our system.

## 2023-10-16 NOTE — CONSULT NOTE ADULT - ASSESSMENT
{\rtf1\bhjzrb78164\ansi\dxqqrnf6552\ftnbj\uc1\deff0  {\fonttbl{\f0 \fnil Segoe UI;}{\f1 \fnil \fcharset0 Segoe UI;}{\f2 \fnil Times New Osvaldo;}}  {\colortbl ;\yjl153\isbzd783\yrch727 ;\red0\green0\blue0 ;\red0\green0\pgwm587 ;\red0\green0\blue0 ;}  {\stylesheet{\f0\fs20 Normal;}{\cs1 Default Paragraph Font;}{\cs2\f0\fs16 Line Number;}{\cs3\f2\fs24\ul\cf3 Hyperlink;}}  {\*\revtbl{Unknown;}}  \lsjour45147\wvyaif28696\hnrtk7048\wbver3099\xxrpz7287\czswr4318\pqrctdd559\xxfcajk346\nogrowautofit\znmzcz966\formshade\nofeaturethrottle1\dntblnsbdb\fet4\aendnotes\aftnnrlc\pgbrdrhead\pgbrdrfoot  \sectd\nwkwlz79155\diixlx03652\guttersxn0\oavqmczk5995\vvjsawiz2522\jauhrkqu1665\nkgtrnku2466\lshvili776\rdkiofa341\sbkpage\pgncont\pgndec  \plain\plain\f0\fs24\ql\plain\f0\fs24\plain\f0\fs20\bvun7527\hich\f0\dbch\f0\loch\f0\fs20\par  I M\par  \par  87 y o M PMH HTN, HLD, HFpEF, Afib (s/p cardioversion), T2D, CKD3 (baseline Cr 1.5-1.7), gout, GERD, GI AVMs (hx bleeds) presenting with productive cough along with dyspnea for the past 1 day. Patient reports he started experiencing a productive cough   of yellowish sputum last night which progressively worsened over the past 24 hours, being admitted for sepsis 2/2 CAP. \par  \plain\f1\fs20\ykpi3773\hich\f1\dbch\f1\loch\f1\cf2\fs20\strike\plain\f1\fs20\ghhh1338\hich\f1\dbch\f1\loch\f1\cf2\fs20\plain\f0\fs20\hbbm5317\hich\f0\dbch\f0\loch\f0\fs20\par  \plain\f1\fs20\pfxc9872\hich\f1\dbch\f1\loch\f1\cf2\fs20\ul{\field{\*\fldinst HYPERLINK [id number] }{\fldrslt Problem/Plan - 1:}}\plain\f0\fs20\btvk3437\hich\f0\dbch\f0\loch\f0\fs20\ql\par  \'b7  {\*\bkmkstart hv88843392662}{\*\bkmkend vq43390040082}Problem: {\*\bkmkstart vq43671212241}{\*\bkmkend qg91226546740}Sepsis. \par  \'b7  {\*\bkmkstart vi69817046917}{\*\bkmkend el31152895439}Plan: {\*\bkmkstart pq10740632898}{\*\bkmkend ks34879104582}Met 2/4 sepsis criteria for RR>20 and WBC>12\par  Most likely in the setting of infectious process, concern for CAP.\par  No urinary, GI symptoms, no new skin findings. \par  \par  - Follow up blood culture-NTD\par  - CAP plan as below.\par  \par  \plain\f1\fs20\ezco5829\hich\f1\dbch\f1\loch\f1\cf2\fs20\ul{\field{\*\fldinst HYPERLINK [id number] }{\fldrslt Problem/Plan - 2:}}\plain\f0\fs20\gkgo2718\hich\f0\dbch\f0\loch\f0\fs20\ql\par  \'b7  {\*\bkmkstart ec27829665541}{\*\bkmkend th39310458264}Problem: {\*\bkmkstart br84191896925}{\*\bkmkend zs79069661333}Acute respiratory failure with hypoxia. \par  \'b7  {\*\bkmkstart xq23822876182}{\*\bkmkend kv80474256384}Plan: {\*\bkmkstart fj96090943283}{\*\bkmkend wr19064478290}Found hypoxemic on arrival.\par  Found with CAP based on symptoms and radiologic findings. \par  - Low concern for PE, currently on full AC, low d-dimer\par  - CAP plan as below.\par  \par  \plain\f1\fs20\esse1022\hich\f1\dbch\f1\loch\f1\cf2\fs20\ul{\field{\*\fldinst HYPERLINK [id number] }{\fldrslt Problem/Plan - 3:}}\plain\f0\fs20\ohzn5024\hich\f0\dbch\f0\loch\f0\fs20\ql\par  \'b7  {\*\bkmkstart yj79133904376}{\*\bkmkend as91636557454}Problem: {\*\bkmkstart aq93066067023}{\*\bkmkend yi92471156829}CAP (community acquired pneumonia). \par  \'b7  {\*\bkmkstart pi68238267083}{\*\bkmkend rs22825215370}Plan: {\*\bkmkstart kk99591488052}{\*\bkmkend od43145329794}-urine leg/Strep neg\par  - Dc'd azithromycin\par  -c/w CTX \par  - If no improvement then consider MRSA swab, however no risk factors for MRSA at this time.\par  \par  \plain\f1\fs20\nbfu7431\hich\f1\dbch\f1\loch\f1\cf2\fs20\ul{\field{\*\fldinst HYPERLINK [id number] }{\fldrslt Problem/Plan - 4:}}\plain\f0\fs20\nbhe0771\hich\f0\dbch\f0\loch\f0\fs20\ql\par  \'b7  {\*\bkmkstart pv26095337721}{\*\bkmkend bt06666553166}Problem: {\*\bkmkstart xr51762023052}{\*\bkmkend fo32680541279}Afib. \par  \'b7  {\*\bkmkstart pf94174125687}{\*\bkmkend tt96757819796}Plan: {\*\bkmkstart bb27152037790}{\*\bkmkend od26683635863}- Continue amiodarone 200 mg qd\par  - COntinue eliquis 2.5 mg bid.\par  \par  \plain\f1\fs20\ckwc5752\hich\f1\dbch\f1\loch\f1\cf2\fs20\ul{\field{\*\fldinst HYPERLINK [id number] }{\fldrslt Problem/Plan - 5:}}\plain\f0\fs20\pfjz4093\hich\f0\dbch\f0\loch\f0\fs20\ql\par  \'b7  {\*\bkmkstart wk60416460988}{\*\bkmkend bf61542662012}Problem: {\*\bkmkstart dq86630724686}{\*\bkmkend hf05812410563}Diabetes. \par  \'b7  {\*\bkmkstart sq68293847602}{\*\bkmkend ur45744617798}Plan: {\*\bkmkstart fx43999502440}{\*\bkmkend xl25444335587}Home meds: Jardiance 10 mg qd\par  - Follow up a1c 6.6 (10/15)\par  - Continue ISS.\plain\f1\fs20\uzzn6514\hich\f1\dbch\f1\loch\f1\cf2\fs20\strike\plain\f0\fs20\elde2851\hich\f0\dbch\f0\loch\f0\fs20\par  \par  \plain\f1\fs20\pnof7256\hich\f1\dbch\f1\loch\f1\cf2\fs20\ul{\field{\*\fldinst HYPERLINK [id number] }{\fldrslt Problem/Plan - 6:}}\plain\f0\fs20\egza2643\hich\f0\dbch\f0\loch\f0\fs20\ql\par  \'b7  {\*\bkmkstart ji79389874079}{\*\bkmkend hq71716314105}Problem: {\*\bkmkstart ps73078748311}{\*\bkmkend su81689313086}Stage 3 chronic kidney disease. \par  \'b7  {\*\bkmkstart un94611612335}{\*\bkmkend xg52338556029}Plan: {\*\bkmkstart uu12387995099}{\*\bkmkend yu57158076664}At baseline on arrival\par  \par  - Avoid nephrotoxic meds.\par  \par  \plain\f1\fs20\yfjm1350\hich\f1\dbch\f1\loch\f1\cf2\fs20\ul{\field{\*\fldinst HYPERLINK [id number] }{\fldrslt Problem/Plan - 7:}}\plain\f0\fs20\qxcb5407\hich\f0\dbch\f0\loch\f0\fs20\ql\par  \'b7  {\*\bkmkstart au02795451856}{\*\bkmkend tt44483015187}Problem: {\*\bkmkstart yc39781030683}{\*\bkmkend ab32536645254}Chronic heart failure with preserved ejection fraction (HFpEF). \par  \'b7  {\*\bkmkstart rh81355942994}{\*\bkmkend hu59286276587}Plan: {\*\bkmkstart mn24341394714}{\*\bkmkend kg02352603484}Euvolemic on exam other than trace bilateral LE edema \par  \par  - s/p lasix 20 mg IV \par  -continue to monitor.\plain\f1\fs20\yrqt3751\hich\f1\dbch\f1\loch\f1\cf2\fs20\strike\plain\f0\fs20\qwpr7051\hich\f0\dbch\f0\loch\f0\fs20\par  \par  \plain\f1\fs20\dbdq9667\hich\f1\dbch\f1\loch\f1\cf2\fs20\ul{\field{\*\fldinst HYPERLINK [id number] }{\fldrslt Problem/Plan - 8:}}\plain\f0\fs20\hvif9660\hich\f0\dbch\f0\loch\f0\fs20\ql\par  \'b7  {\*\bkmkstart sp85033275398}{\*\bkmkend bx50943658060}Problem: {\*\bkmkstart vz83497922299}{\*\bkmkend tt19390482001}Gout. \par  \'b7  {\*\bkmkstart tw75148202783}{\*\bkmkend oj99324211506}Plan: {\*\bkmkstart zz98327185179}{\*\bkmkend am34669988112}- Continue allopurinol 100 mg qd.\par  \par  \plain\f1\fs20\uiwl4738\hich\f1\dbch\f1\loch\f1\cf2\fs20\ul{\field{\*\fldinst HYPERLINK [id number] }{\fldrslt Problem/Plan - 9:}}\plain\f0\fs20\gkry8523\hich\f0\dbch\f0\loch\f0\fs20\ql\par  \'b7  {\*\bkmkstart xo60402048483}{\*\bkmkend mv49537943504}Problem: {\*\bkmkstart wx91347145028}{\*\bkmkend vn79241589424}Hypertension. \par  \'b7  {\*\bkmkstart od28973963944}{\*\bkmkend rp06367365413}Plan: {\*\bkmkstart fx42963850661}{\*\bkmkend vo40832415027}-Home meds: amlodipine 5 mg qd, lisinopril 40 mg qd\par   Holding home meds as normotensive and septic.\plain\f1\fs20\zlrb4121\hich\f1\dbch\f1\loch\f1\cf2\fs20\strike\plain\f0\fs20\paah8219\hich\f0\dbch\f0\loch\f0\fs20\par  \par  \plain\f1\fs20\uhjg3413\hich\f1\dbch\f1\loch\f1\cf2\fs20\ul{\field{\*\fldinst HYPERLINK [id number] }{\fldrslt Problem/Plan - 10:}}\plain\f0\fs20\aett5598\hich\f0\dbch\f0\loch\f0\fs20\ql\par  \'b7  {\*\bkmkstart ng20479549797}{\*\bkmkend rn01048664148}Problem: {\*\bkmkstart pb73195610350}{\*\bkmkend ds28674740216}Hyperlipidemia. \par  \'b7  {\*\bkmkstart ip17729301581}{\*\bkmkend ks56646876290}Plan; {\*\bkmkstart lc80896215696}{\*\bkmkend zk21774515682}- continue atorvastatin 20 mg qhs.\par  \par  \plain\f1\fs20\qlhf6813\hich\f1\dbch\f1\loch\f1\cf2\fs20\ul{\field{\*\fldinst HYPERLINK [id number] }{\fldrslt Problem/Plan - 11:}}\plain\f0\fs20\bbwf3535\hich\f0\dbch\f0\loch\f0\fs20\ql\par  \'b7  {\*\bkmkstart jq408043178935}{\*\bkmkend od629143717995}Problem: {\*\bkmkstart pd442837513853}{\*\bkmkend fd312815017188}Prophylactic measure. \par  \'b7  {\*\bkmkstart lo800022021934}{\*\bkmkend lq656442647629}Plan: {\*\bkmkstart uc770786278844}{\*\bkmkend uh835312796157}F: none\par  E: replete PRN\par  DVT PPx: eliquis\par  GI PPx: none\par  Code: FULL\par  Dispo:\par  \par  \par  }

## 2023-10-16 NOTE — PROGRESS NOTE ADULT - ASSESSMENT
87M PMH HTN, HLD, HFpEF, Afib (s/p cardioversion), T2D, CKD3 (baseline Cr 1.5-1.7), gout, GERD, GI AVMs (hx bleeds) presenting with productive cough along with dyspnea for the past 1 day. Patient reports he started experiencing a productive cough of yellowish sputum last night which progressively worsened over the past 24 hours, being admitted for sepsis 2/2 CAP.

## 2023-10-17 ENCOUNTER — TRANSCRIPTION ENCOUNTER (OUTPATIENT)
Age: 88
End: 2023-10-17

## 2023-10-19 ENCOUNTER — TRANSCRIPTION ENCOUNTER (OUTPATIENT)
Age: 88
End: 2023-10-19

## 2023-10-19 LAB
CULTURE RESULTS: SIGNIFICANT CHANGE UP
SPECIMEN SOURCE: SIGNIFICANT CHANGE UP

## 2023-10-23 DIAGNOSIS — Z90.49 ACQUIRED ABSENCE OF OTHER SPECIFIED PARTS OF DIGESTIVE TRACT: ICD-10-CM

## 2023-10-23 DIAGNOSIS — Z79.4 LONG TERM (CURRENT) USE OF INSULIN: ICD-10-CM

## 2023-10-23 DIAGNOSIS — Z79.899 OTHER LONG TERM (CURRENT) DRUG THERAPY: ICD-10-CM

## 2023-10-23 DIAGNOSIS — K21.9 GASTRO-ESOPHAGEAL REFLUX DISEASE WITHOUT ESOPHAGITIS: ICD-10-CM

## 2023-10-23 DIAGNOSIS — I48.91 UNSPECIFIED ATRIAL FIBRILLATION: ICD-10-CM

## 2023-10-23 DIAGNOSIS — I13.0 HYPERTENSIVE HEART AND CHRONIC KIDNEY DISEASE WITH HEART FAILURE AND STAGE 1 THROUGH STAGE 4 CHRONIC KIDNEY DISEASE, OR UNSPECIFIED CHRONIC KIDNEY DISEASE: ICD-10-CM

## 2023-10-23 DIAGNOSIS — N18.30 CHRONIC KIDNEY DISEASE, STAGE 3 UNSPECIFIED: ICD-10-CM

## 2023-10-23 DIAGNOSIS — E78.5 HYPERLIPIDEMIA, UNSPECIFIED: ICD-10-CM

## 2023-10-23 DIAGNOSIS — J18.9 PNEUMONIA, UNSPECIFIED ORGANISM: ICD-10-CM

## 2023-10-23 DIAGNOSIS — I50.32 CHRONIC DIASTOLIC (CONGESTIVE) HEART FAILURE: ICD-10-CM

## 2023-10-23 DIAGNOSIS — A41.9 SEPSIS, UNSPECIFIED ORGANISM: ICD-10-CM

## 2023-10-23 DIAGNOSIS — M10.9 GOUT, UNSPECIFIED: ICD-10-CM

## 2023-10-23 DIAGNOSIS — J96.01 ACUTE RESPIRATORY FAILURE WITH HYPOXIA: ICD-10-CM

## 2023-10-23 DIAGNOSIS — Z79.01 LONG TERM (CURRENT) USE OF ANTICOAGULANTS: ICD-10-CM

## 2023-10-23 DIAGNOSIS — E11.22 TYPE 2 DIABETES MELLITUS WITH DIABETIC CHRONIC KIDNEY DISEASE: ICD-10-CM

## 2023-10-23 DIAGNOSIS — Z88.2 ALLERGY STATUS TO SULFONAMIDES: ICD-10-CM

## 2023-10-23 DIAGNOSIS — I44.7 LEFT BUNDLE-BRANCH BLOCK, UNSPECIFIED: ICD-10-CM

## 2023-10-23 DIAGNOSIS — Z88.0 ALLERGY STATUS TO PENICILLIN: ICD-10-CM

## 2023-10-23 DIAGNOSIS — Z71.3 DIETARY COUNSELING AND SURVEILLANCE: ICD-10-CM

## 2024-04-17 NOTE — ED ADULT TRIAGE NOTE - NSWEIGHTCALCTOOLDRUG_GEN_A_CORE
"Subjective:     Patient ID: Chau Price is a 68 y.o. male.    Chief Complaint: Foot Injury (Fx rt 5th toe and metatarsal)  Patient presents to clinic following a recent injury to the Rt. Forefoot.  Notes that while walking he experienced a "pop" with subsequent pain along the Rt. Lateral foot.  He reports presenting to Urgent Care where x-rays were taken and nondisplaced fractures were noted to the Rt. 5th proximal phalanx and base of the 5th metatarsal.  Notes he was placed in a tall walking boot and has been minimizing weight bearing activity ever since.  Rates pain as a 7/10.  Symptoms are alleviated only with rest.  He is unable to account for a specific injury or inciting event leading to said fracture.  Notes being placed on a heavy dose of steroids, in the past, which has weakened his bones.  Denies any additional pedal complaints.        Past Medical History:   Diagnosis Date    Anticoagulant long-term use     Arthritis     Glaucoma     Hypertension     Other ulcerative colitis with rectal bleeding     Sleep apnea     USES C-PAP       Past Surgical History:   Procedure Laterality Date    BACK SURGERY  2008 2008-2017; cervical fusion x2 & lumbar fusion x3    INCONTINENCE SURGERY      Urolift    LEFT HEART CATHETERIZATION  9/21/2023    Procedure: Left heart cath;  Surgeon: Lakeisha Mcdaniel MD;  Location: [facility] CATH;  Service: Cardiology;;    REPAIR OF MENISCUS OF KNEE Left 2010    ROTATOR CUFF REPAIR Right 1997    SURGICAL REMOVAL OF BONE SPUR Right 2/28/2024    Procedure: EXCISION, BONE SPUR- 5th metatarsal;  Surgeon: Emmanuel Liao DPM;  Location: [facility] OR;  Service: Podiatry;  Laterality: Right;    TRANSFORAMINAL EPIDURAL INJECTION OF STEROID Left 10/21/2022    Procedure: Injection,steroid,epidural,transforaminal approach L4/5+L5/S1;  Surgeon: Diogo Travis MD;  Location: [facility] OR;  Service: Pain Management;  Laterality: Left;       Family History   Problem Relation Name Age of Onset    " Cancer Mother          lung    Glaucoma Father      Cancer Father          bladder    Macular degeneration Neg Hx         Social History     Socioeconomic History    Marital status:    Tobacco Use    Smoking status: Former     Types: Cigars    Smokeless tobacco: Never   Substance and Sexual Activity    Alcohol use: Not Currently     Comment: social       Current Outpatient Medications   Medication Sig Dispense Refill    amitriptyline (ELAVIL) 25 MG tablet Take 1 tablet (25 mg total) by mouth every evening. 90 tablet 3    ascorbic acid, vitamin C, (VITAMIN C) 500 MG tablet Take 500 mg by mouth once daily.      aspirin (ECOTRIN) 81 MG EC tablet Take 1 tablet (81 mg total) by mouth once daily. 90 tablet 3    augmented betamethasone dipropionate (DIPROLENE-AF) 0.05 % cream APPLY  CREAM TOPICALLY TO AFFECTED AREA TWICE DAILY AS NEEDED 50 g 0    clobetasoL (TEMOVATE) 0.05 % cream Apply topically.      diltiaZEM (DILACOR XR) 120 MG CDCR Take 1 capsule (120 mg total) by mouth once daily. 90 capsule 3    fexofenadine (ALLEGRA) 180 MG tablet Take 180 mg by mouth once daily.      fluticasone propionate (CUTIVATE) 0.05 % cream Apply topically once daily. 60 g 0    gabapentin (NEURONTIN) 600 MG tablet Take 1 tablet (600 mg total) by mouth once daily. 30 tablet 0    HYDROcodone-acetaminophen (NORCO) 5-325 mg per tablet Take 1 tablet by mouth every 4 (four) hours as needed for Pain. 42 tablet 0    HYDROcodone-acetaminophen (NORCO) 7.5-325 mg per tablet Take 1 tablet by mouth every 8 (eight) hours as needed for Pain. 21 tablet 0    hydrocortisone butyrate (LOCOID) 0.1 % Lotn Apply 1 Application topically daily as needed. 59 mL 3    latanoprost 0.005 % ophthalmic solution INSTILL 1 DROP INTO EACH EYE IN THE EVENING (Patient taking differently: Place 1 drop into both eyes every evening. INSTILL 1 DROP INTO EACH EYE IN THE EVENING) 9 mL 3    losartan (COZAAR) 50 MG tablet Take 1 tablet (50 mg total) by mouth once daily. 90  "tablet 3    mesalamine (LIALDA) 1.2 gram TbEC Take 4 tablets (4.8 g total) by mouth daily with breakfast. 360 tablet 4    methocarbamoL (ROBAXIN) 750 MG Tab Take 1 tablet (750 mg total) by mouth 4 (four) times daily as needed. 270 tablet 3    metoprolol succinate (TOPROL-XL) 25 MG 24 hr tablet Take 1 tablet (25 mg total) by mouth once daily. 90 tablet 3    naproxen (NAPROSYN) 500 MG tablet Take 500 mg by mouth 2 (two) times daily as needed.      omega 3-dha-epa-fish oil (FISH OIL) 1,200 (144-216) mg Cap Take 1 capsule by mouth Daily.      ondansetron (ZOFRAN-ODT) 4 MG TbDL Take 1 tablet (4 mg total) by mouth every 6 (six) hours as needed. 10 tablet 1    pantoprazole (PROTONIX) 20 MG tablet Take 1 tablet (20 mg total) by mouth once daily. 90 tablet 2    traZODone (DESYREL) 50 MG tablet Take 1 tablet (50 mg total) by mouth every evening. 90 tablet 4     No current facility-administered medications for this visit.     Facility-Administered Medications Ordered in Other Visits   Medication Dose Route Frequency Provider Last Rate Last Admin    lactated ringers infusion   Intravenous Continuous Radha Bishop MD   New Bag at 02/28/24 1438    LIDOcaine (PF) 10 mg/ml (1%) injection 10 mg  1 mL Intradermal Once Radha Bishop MD           Review of patient's allergies indicates:   Allergen Reactions    Azathioprine Other (See Comments)     Megacolon, "colon almost burst"  Other reaction(s): Not available    Mercaptopurine Other (See Comments)     Megacolon, "colon almost burst"        Hemoglobin A1C   Date Value Ref Range Status   06/01/2023 5.0 0.0 - 5.6 % Final     Comment:     Reference Interval:  5.0 - 5.6 Normal   5.7 - 6.4 High Risk   > 6.5 Diabetic      Hgb A1c results are standardized based on the (NGSP) National   Glycohemoglobin Standardization Program.      Hemoglobin A1C levels are related to mean serum/plasma glucose   during the preceding 2-3 months.            Review of Systems   Constitutional: " Negative for chills and fever.   Skin:  Negative for color change, nail changes and suspicious lesions.   Musculoskeletal:  Positive for joint pain and myalgias. Negative for muscle cramps and muscle weakness.   Gastrointestinal:  Negative for nausea and vomiting.   Neurological:  Negative for numbness and paresthesias.   Psychiatric/Behavioral:  Negative for altered mental status.         Objective:     Physical Exam  Constitutional:       Appearance: Normal appearance. He is not ill-appearing.   Cardiovascular:      Pulses:           Dorsalis pedis pulses are 2+ on the right side and 2+ on the left side.        Posterior tibial pulses are 2+ on the right side and 2+ on the left side.      Comments: CFT is < 3 seconds bilateral.  Pedal hair growth is present bilateral.  Moderate localized edema overlying the Rt. 5th ray. Toes are warm to touch bilateral.    Musculoskeletal:         General: Swelling and tenderness present.      Comments: Muscle strength 5/5 in all muscle groups bilateral.  No tenderness nor crepitation with ROM of foot/ankle joints bilateral.  Pain with palpation to the base of the Rt. 5th proximal phalanx as well as to the base of the 5th metatarsal.  No gross deformity noted with the Rt. 5th ray being well aligned.  Prominent styloid process noted on the Lt.   Skin:     Capillary Refill: Capillary refill takes 2 to 3 seconds.      Findings: No bruising, ecchymosis, erythema, signs of injury, laceration, lesion, petechiae or rash.      Comments: Incision overlying the Rt. Styloid process remains well healed.     Neurological:      General: No focal deficit present.      Mental Status: He is alert.      Sensory: No sensory deficit.      Motor: No atrophy.      Comments: Light touch is intact bilateral.             Assessment:      Encounter Diagnosis   Name Primary?    Closed nondisplaced fracture of metatarsal bone of right foot, unspecified metatarsal, initial encounter Yes     Plan:     Chau was  seen today for foot injury.    Diagnoses and all orders for this visit:    Closed nondisplaced fracture of metatarsal bone of right foot, unspecified metatarsal, initial encounter  -     HYDROcodone-acetaminophen (NORCO) 7.5-325 mg per tablet; Take 1 tablet by mouth every 8 (eight) hours as needed for Pain.      I counseled the patient on his conditions, their implications and medical management.    Based on today's exam, and findings from the most recent report by radiology, he has sustained nondisplaced fractures of the proximal phalanx of the 5th digit as well as the base of the 5th metatarsal.    I advised to minimize weight bearing for the next 6 weeks.  He is to stay in the postoperative boot all times while weight bearing.    Advised to rest and ice the affected limb daily.    Explained that the toe fracture should heal quite well, however, 5th metatarsal base fractures are tenuous at best.  If this fails to heal with offloading he may require ORIF in the future.    RTC in 2 weeks for a repeat x-ray of the Rt. Foot.     Emmanuel Liao DPM        used

## 2024-05-20 NOTE — PATIENT PROFILE ADULT - VISION (WITH CORRECTIVE LENSES IF THE PATIENT USUALLY WEARS THEM):
Shaggy Rodriguez was seen and treated in our emergency department on 5/20/2024.                Diagnosis:     Shaggy  may return to gym class or sports after being cleared by physician.    He may return on this date:          If you have any questions or concerns, please don't hesitate to call.      Mary Villela MD    ______________________________           _______________          _______________  Hospital Representative                              Date                                Time Partially impaired: cannot see medication labels or newsprint, but can see obstacles in path, and the surrounding layout; can count fingers at arm's length

## 2024-05-22 NOTE — PATIENT PROFILE ADULT - ..
I have personally seen and examined the patient. I have collaborated with and supervised the
14-Oct-2023 18:39:47

## 2024-06-27 ENCOUNTER — APPOINTMENT (OUTPATIENT)
Dept: ULTRASOUND IMAGING | Facility: IMAGING CENTER | Age: 89
End: 2024-06-27

## 2024-06-27 ENCOUNTER — OUTPATIENT (OUTPATIENT)
Dept: OUTPATIENT SERVICES | Facility: HOSPITAL | Age: 89
LOS: 1 days | End: 2024-06-27
Payer: MEDICARE

## 2024-06-27 DIAGNOSIS — Z00.8 ENCOUNTER FOR OTHER GENERAL EXAMINATION: ICD-10-CM

## 2024-06-27 DIAGNOSIS — Z90.49 ACQUIRED ABSENCE OF OTHER SPECIFIED PARTS OF DIGESTIVE TRACT: Chronic | ICD-10-CM

## 2024-06-27 PROCEDURE — 76536 US EXAM OF HEAD AND NECK: CPT | Mod: 26

## 2024-06-27 PROCEDURE — 76536 US EXAM OF HEAD AND NECK: CPT

## 2024-07-29 NOTE — DISCHARGE NOTE PROVIDER - NSDCADMDATE_GEN_ALL_CORE_FT
Maintain good oral hygiene.  Will f/u as needed to ensure continued tolerance of recommended diet.
14-Oct-2023 15:55

## 2024-09-11 ENCOUNTER — INPATIENT (INPATIENT)
Facility: HOSPITAL | Age: 88
LOS: 2 days | Discharge: HOME HEALTH | DRG: 204 | End: 2024-09-14
Attending: STUDENT IN AN ORGANIZED HEALTH CARE EDUCATION/TRAINING PROGRAM | Admitting: HOSPITALIST
Payer: MEDICARE

## 2024-09-11 VITALS
HEIGHT: 69 IN | SYSTOLIC BLOOD PRESSURE: 179 MMHG | HEART RATE: 115 BPM | RESPIRATION RATE: 22 BRPM | TEMPERATURE: 99 F | OXYGEN SATURATION: 88 % | DIASTOLIC BLOOD PRESSURE: 95 MMHG | WEIGHT: 190.04 LBS

## 2024-09-11 DIAGNOSIS — Z90.49 ACQUIRED ABSENCE OF OTHER SPECIFIED PARTS OF DIGESTIVE TRACT: Chronic | ICD-10-CM

## 2024-09-11 LAB
ALBUMIN SERPL ELPH-MCNC: 4.2 G/DL — SIGNIFICANT CHANGE UP (ref 3.3–5)
ALP SERPL-CCNC: 72 U/L — SIGNIFICANT CHANGE UP (ref 40–120)
ALT FLD-CCNC: 14 U/L — SIGNIFICANT CHANGE UP (ref 10–45)
ANION GAP SERPL CALC-SCNC: 19 MMOL/L — HIGH (ref 5–17)
ANISOCYTOSIS BLD QL: SLIGHT — SIGNIFICANT CHANGE UP
APTT BLD: 38 SEC — HIGH (ref 24.5–35.6)
AST SERPL-CCNC: 20 U/L — SIGNIFICANT CHANGE UP (ref 10–40)
BASOPHILS # BLD AUTO: 0 K/UL — SIGNIFICANT CHANGE UP (ref 0–0.2)
BASOPHILS NFR BLD AUTO: 0 % — SIGNIFICANT CHANGE UP (ref 0–2)
BILIRUB SERPL-MCNC: 0.9 MG/DL — SIGNIFICANT CHANGE UP (ref 0.2–1.2)
BUN SERPL-MCNC: 28 MG/DL — HIGH (ref 7–23)
BURR CELLS BLD QL SMEAR: PRESENT — SIGNIFICANT CHANGE UP
CALCIUM SERPL-MCNC: 9.3 MG/DL — SIGNIFICANT CHANGE UP (ref 8.4–10.5)
CHLORIDE SERPL-SCNC: 102 MMOL/L — SIGNIFICANT CHANGE UP (ref 96–108)
CO2 SERPL-SCNC: 21 MMOL/L — LOW (ref 22–31)
CREAT SERPL-MCNC: 1.41 MG/DL — HIGH (ref 0.5–1.3)
DACRYOCYTES BLD QL SMEAR: SLIGHT — SIGNIFICANT CHANGE UP
EGFR: 47 ML/MIN/1.73M2 — LOW
EGFR: 47 ML/MIN/1.73M2 — LOW
EOSINOPHIL # BLD AUTO: 0 K/UL — SIGNIFICANT CHANGE UP (ref 0–0.5)
EOSINOPHIL NFR BLD AUTO: 0 % — SIGNIFICANT CHANGE UP (ref 0–6)
FLUAV AG NPH QL: SIGNIFICANT CHANGE UP
FLUBV AG NPH QL: SIGNIFICANT CHANGE UP
GAS PNL BLDV: SIGNIFICANT CHANGE UP
GIANT PLATELETS BLD QL SMEAR: PRESENT — SIGNIFICANT CHANGE UP
GLUCOSE SERPL-MCNC: 186 MG/DL — HIGH (ref 70–99)
HCT VFR BLD CALC: 50.3 % — HIGH (ref 39–50)
HGB BLD-MCNC: 16.5 G/DL — SIGNIFICANT CHANGE UP (ref 13–17)
INR BLD: 1.21 RATIO — HIGH (ref 0.85–1.18)
LYMPHOCYTES # BLD AUTO: 0.68 K/UL — LOW (ref 1–3.3)
LYMPHOCYTES # BLD AUTO: 6.1 % — LOW (ref 13–44)
MACROCYTES BLD QL: SLIGHT — SIGNIFICANT CHANGE UP
MANUAL SMEAR VERIFICATION: SIGNIFICANT CHANGE UP
MCHC RBC-ENTMCNC: 32.8 GM/DL — SIGNIFICANT CHANGE UP (ref 32–36)
MCHC RBC-ENTMCNC: 33.6 PG — SIGNIFICANT CHANGE UP (ref 27–34)
MCV RBC AUTO: 102.4 FL — HIGH (ref 80–100)
MONOCYTES # BLD AUTO: 1.06 K/UL — HIGH (ref 0–0.9)
MONOCYTES NFR BLD AUTO: 9.5 % — SIGNIFICANT CHANGE UP (ref 2–14)
MYELOCYTES NFR BLD: 0.9 % — HIGH (ref 0–0)
NEUTROPHILS # BLD AUTO: 9.29 K/UL — HIGH (ref 1.8–7.4)
NEUTROPHILS NFR BLD AUTO: 83.5 % — HIGH (ref 43–77)
PLAT MORPH BLD: NORMAL — SIGNIFICANT CHANGE UP
PLATELET # BLD AUTO: 109 K/UL — LOW (ref 150–400)
POIKILOCYTOSIS BLD QL AUTO: SLIGHT — SIGNIFICANT CHANGE UP
POTASSIUM SERPL-MCNC: 4.1 MMOL/L — SIGNIFICANT CHANGE UP (ref 3.5–5.3)
POTASSIUM SERPL-SCNC: 4.1 MMOL/L — SIGNIFICANT CHANGE UP (ref 3.5–5.3)
PROT SERPL-MCNC: 8.1 G/DL — SIGNIFICANT CHANGE UP (ref 6–8.3)
PROTHROM AB SERPL-ACNC: 13.2 SEC — HIGH (ref 9.5–13)
RBC # BLD: 4.91 M/UL — SIGNIFICANT CHANGE UP (ref 4.2–5.8)
RBC # FLD: 14.6 % — HIGH (ref 10.3–14.5)
RBC BLD AUTO: ABNORMAL
RSV RNA NPH QL NAA+NON-PROBE: SIGNIFICANT CHANGE UP
SARS-COV-2 RNA SPEC QL NAA+PROBE: SIGNIFICANT CHANGE UP
SODIUM SERPL-SCNC: 142 MMOL/L — SIGNIFICANT CHANGE UP (ref 135–145)
TROPONIN T, HIGH SENSITIVITY RESULT: 35 NG/L — SIGNIFICANT CHANGE UP (ref 0–51)
WBC # BLD: 11.12 K/UL — HIGH (ref 3.8–10.5)
WBC # FLD AUTO: 11.12 K/UL — HIGH (ref 3.8–10.5)

## 2024-09-11 PROCEDURE — 71045 X-RAY EXAM CHEST 1 VIEW: CPT | Mod: 26

## 2024-09-11 PROCEDURE — 99285 EMERGENCY DEPT VISIT HI MDM: CPT | Mod: GC

## 2024-09-11 RX ORDER — CEFTRIAXONE 500 MG/1
1000 INJECTION, POWDER, FOR SOLUTION INTRAMUSCULAR; INTRAVENOUS ONCE
Refills: 0 | Status: COMPLETED | OUTPATIENT
Start: 2024-09-11 | End: 2024-09-11

## 2024-09-11 RX ORDER — ACETAMINOPHEN 500 MG/5ML
1000 LIQUID (ML) ORAL ONCE
Refills: 0 | Status: COMPLETED | OUTPATIENT
Start: 2024-09-11 | End: 2024-09-11

## 2024-09-11 RX ORDER — AZITHROMYCIN 250 MG
500 CAPSULE ORAL ONCE
Refills: 0 | Status: COMPLETED | OUTPATIENT
Start: 2024-09-11 | End: 2024-09-11

## 2024-09-11 RX ADMIN — Medication 400 MILLIGRAM(S): at 18:58

## 2024-09-11 RX ADMIN — Medication 255 MILLIGRAM(S): at 18:54

## 2024-09-11 RX ADMIN — CEFTRIAXONE 100 MILLIGRAM(S): 500 INJECTION, POWDER, FOR SOLUTION INTRAMUSCULAR; INTRAVENOUS at 21:11

## 2024-09-12 DIAGNOSIS — I50.32 CHRONIC DIASTOLIC (CONGESTIVE) HEART FAILURE: ICD-10-CM

## 2024-09-12 DIAGNOSIS — J18.9 PNEUMONIA, UNSPECIFIED ORGANISM: ICD-10-CM

## 2024-09-12 DIAGNOSIS — I48.91 UNSPECIFIED ATRIAL FIBRILLATION: ICD-10-CM

## 2024-09-12 DIAGNOSIS — J96.01 ACUTE RESPIRATORY FAILURE WITH HYPOXIA: ICD-10-CM

## 2024-09-12 DIAGNOSIS — A41.9 SEPSIS, UNSPECIFIED ORGANISM: ICD-10-CM

## 2024-09-12 DIAGNOSIS — Z29.9 ENCOUNTER FOR PROPHYLACTIC MEASURES, UNSPECIFIED: ICD-10-CM

## 2024-09-12 DIAGNOSIS — G25.0 ESSENTIAL TREMOR: ICD-10-CM

## 2024-09-12 DIAGNOSIS — R06.02 SHORTNESS OF BREATH: ICD-10-CM

## 2024-09-12 DIAGNOSIS — N18.9 CHRONIC KIDNEY DISEASE, UNSPECIFIED: ICD-10-CM

## 2024-09-12 DIAGNOSIS — I48.20 CHRONIC ATRIAL FIBRILLATION, UNSPECIFIED: ICD-10-CM

## 2024-09-12 LAB
ANION GAP SERPL CALC-SCNC: 17 MMOL/L — SIGNIFICANT CHANGE UP (ref 5–17)
BUN SERPL-MCNC: 27 MG/DL — HIGH (ref 7–23)
CALCIUM SERPL-MCNC: 8.3 MG/DL — LOW (ref 8.4–10.5)
CHLORIDE SERPL-SCNC: 103 MMOL/L — SIGNIFICANT CHANGE UP (ref 96–108)
CO2 SERPL-SCNC: 21 MMOL/L — LOW (ref 22–31)
CREAT SERPL-MCNC: 1.26 MG/DL — SIGNIFICANT CHANGE UP (ref 0.5–1.3)
EGFR: 54 ML/MIN/1.73M2 — LOW
EGFR: 54 ML/MIN/1.73M2 — LOW
GLUCOSE SERPL-MCNC: 170 MG/DL — HIGH (ref 70–99)
HCT VFR BLD CALC: 41.7 % — SIGNIFICANT CHANGE UP (ref 39–50)
HGB BLD-MCNC: 13.7 G/DL — SIGNIFICANT CHANGE UP (ref 13–17)
LEGIONELLA AG UR QL: NEGATIVE — SIGNIFICANT CHANGE UP
MAGNESIUM SERPL-MCNC: 2 MG/DL — SIGNIFICANT CHANGE UP (ref 1.6–2.6)
MCHC RBC-ENTMCNC: 32.9 GM/DL — SIGNIFICANT CHANGE UP (ref 32–36)
MCHC RBC-ENTMCNC: 33.3 PG — SIGNIFICANT CHANGE UP (ref 27–34)
MCV RBC AUTO: 101.5 FL — HIGH (ref 80–100)
MRSA PCR RESULT.: SIGNIFICANT CHANGE UP
NRBC # BLD: 0 /100 WBCS — SIGNIFICANT CHANGE UP (ref 0–0)
NRBC BLD-RTO: 0 /100 WBCS — SIGNIFICANT CHANGE UP (ref 0–0)
PHOSPHATE SERPL-MCNC: 3.5 MG/DL — SIGNIFICANT CHANGE UP (ref 2.5–4.5)
PLATELET # BLD AUTO: 103 K/UL — LOW (ref 150–400)
POTASSIUM SERPL-MCNC: 3.8 MMOL/L — SIGNIFICANT CHANGE UP (ref 3.5–5.3)
POTASSIUM SERPL-SCNC: 3.8 MMOL/L — SIGNIFICANT CHANGE UP (ref 3.5–5.3)
RBC # BLD: 4.11 M/UL — LOW (ref 4.2–5.8)
RBC # FLD: 14.7 % — HIGH (ref 10.3–14.5)
S AUREUS DNA NOSE QL NAA+PROBE: SIGNIFICANT CHANGE UP
S PNEUM AG UR QL: NEGATIVE — SIGNIFICANT CHANGE UP
SODIUM SERPL-SCNC: 141 MMOL/L — SIGNIFICANT CHANGE UP (ref 135–145)
WBC # BLD: 12.18 K/UL — HIGH (ref 3.8–10.5)
WBC # FLD AUTO: 12.18 K/UL — HIGH (ref 3.8–10.5)

## 2024-09-12 PROCEDURE — 99223 1ST HOSP IP/OBS HIGH 75: CPT

## 2024-09-12 PROCEDURE — 71250 CT THORAX DX C-: CPT | Mod: 26

## 2024-09-12 RX ORDER — SODIUM CHLORIDE 9 G/1000ML
500 INJECTION, SOLUTION INTRAVENOUS
Refills: 0 | Status: COMPLETED | OUTPATIENT
Start: 2024-09-12 | End: 2024-09-12

## 2024-09-12 RX ORDER — AZITHROMYCIN 250 MG
500 CAPSULE ORAL EVERY 24 HOURS
Refills: 0 | Status: DISCONTINUED | OUTPATIENT
Start: 2024-09-12 | End: 2024-09-13

## 2024-09-12 RX ORDER — PROPRANOLOL HCL 60 MG
0.5 TABLET ORAL
Refills: 0 | DISCHARGE

## 2024-09-12 RX ORDER — MAGNESIUM, ALUMINUM HYDROXIDE 200-200 MG
30 TABLET,CHEWABLE ORAL EVERY 4 HOURS
Refills: 0 | Status: DISCONTINUED | OUTPATIENT
Start: 2024-09-12 | End: 2024-09-14

## 2024-09-12 RX ORDER — DEXTROMETHORPHAN HBR, GUAIFENESIN 200 MG/10ML
1200 LIQUID ORAL EVERY 12 HOURS
Refills: 0 | Status: DISCONTINUED | OUTPATIENT
Start: 2024-09-12 | End: 2024-09-14

## 2024-09-12 RX ORDER — AMLODIPINE BESYLATE 10 MG/1
5 TABLET ORAL DAILY
Refills: 0 | Status: DISCONTINUED | OUTPATIENT
Start: 2024-09-12 | End: 2024-09-14

## 2024-09-12 RX ORDER — INFLUENZA A VIRUS A/IDAHO/07/2018 (H1N1) ANTIGEN (MDCK CELL DERIVED, PROPIOLACTONE INACTIVATED, INFLUENZA A VIRUS A/INDIANA/08/2018 (H3N2) ANTIGEN (MDCK CELL DERIVED, PROPIOLACTONE INACTIVATED), INFLUENZA B VIRUS B/SINGAPORE/INFTT-16-0610/2016 ANTIGEN (MDCK CELL DERIVED, PROPIOLACTONE INACTIVATED), INFLUENZA B VIRUS B/IOWA/06/2017 ANTIGEN (MDCK CELL DERIVED, PROPIOLACTONE INACTIVATED) 15; 15; 15; 15 UG/.5ML; UG/.5ML; UG/.5ML; UG/.5ML
0.5 INJECTION, SUSPENSION INTRAMUSCULAR ONCE
Refills: 0 | Status: DISCONTINUED | OUTPATIENT
Start: 2024-09-12 | End: 2024-09-14

## 2024-09-12 RX ORDER — CEFTRIAXONE 500 MG/1
1000 INJECTION, POWDER, FOR SOLUTION INTRAMUSCULAR; INTRAVENOUS EVERY 24 HOURS
Refills: 0 | Status: DISCONTINUED | OUTPATIENT
Start: 2024-09-12 | End: 2024-09-14

## 2024-09-12 RX ORDER — MELATONIN 5 MG
3 TABLET ORAL AT BEDTIME
Refills: 0 | Status: DISCONTINUED | OUTPATIENT
Start: 2024-09-12 | End: 2024-09-14

## 2024-09-12 RX ORDER — ATORVASTATIN CALCIUM 80 MG/1
20 TABLET, FILM COATED ORAL AT BEDTIME
Refills: 0 | Status: DISCONTINUED | OUTPATIENT
Start: 2024-09-12 | End: 2024-09-14

## 2024-09-12 RX ORDER — APIXABAN 2.5 MG/1
2.5 TABLET, FILM COATED ORAL EVERY 12 HOURS
Refills: 0 | Status: DISCONTINUED | OUTPATIENT
Start: 2024-09-12 | End: 2024-09-14

## 2024-09-12 RX ORDER — LISINOPRIL 5 MG/1
40 TABLET ORAL DAILY
Refills: 0 | Status: DISCONTINUED | OUTPATIENT
Start: 2024-09-12 | End: 2024-09-14

## 2024-09-12 RX ORDER — ACETAMINOPHEN 500 MG/5ML
650 LIQUID (ML) ORAL EVERY 6 HOURS
Refills: 0 | Status: DISCONTINUED | OUTPATIENT
Start: 2024-09-12 | End: 2024-09-14

## 2024-09-12 RX ORDER — PROPRANOLOL HCL 60 MG
1 TABLET ORAL
Refills: 0 | DISCHARGE

## 2024-09-12 RX ORDER — ONDANSETRON HCL/PF 4 MG/2 ML
4 VIAL (ML) INJECTION EVERY 8 HOURS
Refills: 0 | Status: DISCONTINUED | OUTPATIENT
Start: 2024-09-12 | End: 2024-09-14

## 2024-09-12 RX ADMIN — Medication 1 APPLICATION(S): at 16:07

## 2024-09-12 RX ADMIN — APIXABAN 2.5 MILLIGRAM(S): 2.5 TABLET, FILM COATED ORAL at 17:03

## 2024-09-12 RX ADMIN — LISINOPRIL 40 MILLIGRAM(S): 5 TABLET ORAL at 06:05

## 2024-09-12 RX ADMIN — DEXTROMETHORPHAN HBR, GUAIFENESIN 1200 MILLIGRAM(S): 200 LIQUID ORAL at 17:03

## 2024-09-12 RX ADMIN — AMLODIPINE BESYLATE 5 MILLIGRAM(S): 10 TABLET ORAL at 06:05

## 2024-09-12 RX ADMIN — SODIUM CHLORIDE 75 MILLILITER(S): 9 INJECTION, SOLUTION INTRAVENOUS at 06:12

## 2024-09-12 RX ADMIN — CEFTRIAXONE 100 MILLIGRAM(S): 500 INJECTION, POWDER, FOR SOLUTION INTRAMUSCULAR; INTRAVENOUS at 21:10

## 2024-09-12 RX ADMIN — Medication 100 MILLIGRAM(S): at 11:21

## 2024-09-12 RX ADMIN — ATORVASTATIN CALCIUM 20 MILLIGRAM(S): 80 TABLET, FILM COATED ORAL at 21:11

## 2024-09-12 RX ADMIN — APIXABAN 2.5 MILLIGRAM(S): 2.5 TABLET, FILM COATED ORAL at 02:20

## 2024-09-12 RX ADMIN — Medication 255 MILLIGRAM(S): at 18:16

## 2024-09-13 ENCOUNTER — TRANSCRIPTION ENCOUNTER (OUTPATIENT)
Age: 89
End: 2024-09-13

## 2024-09-13 LAB
ALBUMIN SERPL ELPH-MCNC: 3.2 G/DL — LOW (ref 3.3–5)
ALP SERPL-CCNC: 49 U/L — SIGNIFICANT CHANGE UP (ref 40–120)
ALT FLD-CCNC: 9 U/L — LOW (ref 10–45)
ANION GAP SERPL CALC-SCNC: 12 MMOL/L — SIGNIFICANT CHANGE UP (ref 5–17)
AST SERPL-CCNC: 13 U/L — SIGNIFICANT CHANGE UP (ref 10–40)
BILIRUB SERPL-MCNC: 0.7 MG/DL — SIGNIFICANT CHANGE UP (ref 0.2–1.2)
BUN SERPL-MCNC: 32 MG/DL — HIGH (ref 7–23)
CALCIUM SERPL-MCNC: 8.6 MG/DL — SIGNIFICANT CHANGE UP (ref 8.4–10.5)
CHLORIDE SERPL-SCNC: 105 MMOL/L — SIGNIFICANT CHANGE UP (ref 96–108)
CO2 SERPL-SCNC: 22 MMOL/L — SIGNIFICANT CHANGE UP (ref 22–31)
CREAT SERPL-MCNC: 1.48 MG/DL — HIGH (ref 0.5–1.3)
EGFR: 45 ML/MIN/1.73M2 — LOW
EGFR: 45 ML/MIN/1.73M2 — LOW
GLUCOSE SERPL-MCNC: 114 MG/DL — HIGH (ref 70–99)
HCT VFR BLD CALC: 40.9 % — SIGNIFICANT CHANGE UP (ref 39–50)
HGB BLD-MCNC: 13.1 G/DL — SIGNIFICANT CHANGE UP (ref 13–17)
MAGNESIUM SERPL-MCNC: 2.2 MG/DL — SIGNIFICANT CHANGE UP (ref 1.6–2.6)
MCHC RBC-ENTMCNC: 32 GM/DL — SIGNIFICANT CHANGE UP (ref 32–36)
MCHC RBC-ENTMCNC: 32.5 PG — SIGNIFICANT CHANGE UP (ref 27–34)
MCV RBC AUTO: 101.5 FL — HIGH (ref 80–100)
NRBC # BLD: 0 /100 WBCS — SIGNIFICANT CHANGE UP (ref 0–0)
NRBC BLD-RTO: 0 /100 WBCS — SIGNIFICANT CHANGE UP (ref 0–0)
PHOSPHATE SERPL-MCNC: 3.6 MG/DL — SIGNIFICANT CHANGE UP (ref 2.5–4.5)
PLATELET # BLD AUTO: 107 K/UL — LOW (ref 150–400)
POTASSIUM SERPL-MCNC: 3.9 MMOL/L — SIGNIFICANT CHANGE UP (ref 3.5–5.3)
POTASSIUM SERPL-SCNC: 3.9 MMOL/L — SIGNIFICANT CHANGE UP (ref 3.5–5.3)
PROT SERPL-MCNC: 6.5 G/DL — SIGNIFICANT CHANGE UP (ref 6–8.3)
RBC # BLD: 4.03 M/UL — LOW (ref 4.2–5.8)
RBC # FLD: 14.9 % — HIGH (ref 10.3–14.5)
SODIUM SERPL-SCNC: 139 MMOL/L — SIGNIFICANT CHANGE UP (ref 135–145)
WBC # BLD: 7.45 K/UL — SIGNIFICANT CHANGE UP (ref 3.8–10.5)
WBC # FLD AUTO: 7.45 K/UL — SIGNIFICANT CHANGE UP (ref 3.8–10.5)

## 2024-09-13 PROCEDURE — 99232 SBSQ HOSP IP/OBS MODERATE 35: CPT | Mod: GC

## 2024-09-13 RX ORDER — IPRATROPIUM BROMIDE AND ALBUTEROL SULFATE .5; 2.5 MG/3ML; MG/3ML
3 SOLUTION RESPIRATORY (INHALATION) EVERY 6 HOURS
Refills: 0 | Status: DISCONTINUED | OUTPATIENT
Start: 2024-09-13 | End: 2024-09-14

## 2024-09-13 RX ADMIN — ATORVASTATIN CALCIUM 20 MILLIGRAM(S): 80 TABLET, FILM COATED ORAL at 21:07

## 2024-09-13 RX ADMIN — AMLODIPINE BESYLATE 5 MILLIGRAM(S): 10 TABLET ORAL at 05:13

## 2024-09-13 RX ADMIN — DEXTROMETHORPHAN HBR, GUAIFENESIN 1200 MILLIGRAM(S): 200 LIQUID ORAL at 05:12

## 2024-09-13 RX ADMIN — APIXABAN 2.5 MILLIGRAM(S): 2.5 TABLET, FILM COATED ORAL at 17:13

## 2024-09-13 RX ADMIN — LISINOPRIL 40 MILLIGRAM(S): 5 TABLET ORAL at 05:13

## 2024-09-13 RX ADMIN — IPRATROPIUM BROMIDE AND ALBUTEROL SULFATE 3 MILLILITER(S): .5; 2.5 SOLUTION RESPIRATORY (INHALATION) at 20:48

## 2024-09-13 RX ADMIN — Medication 100 MILLIGRAM(S): at 12:43

## 2024-09-13 RX ADMIN — DEXTROMETHORPHAN HBR, GUAIFENESIN 1200 MILLIGRAM(S): 200 LIQUID ORAL at 17:14

## 2024-09-13 RX ADMIN — APIXABAN 2.5 MILLIGRAM(S): 2.5 TABLET, FILM COATED ORAL at 05:13

## 2024-09-13 RX ADMIN — Medication 1 APPLICATION(S): at 12:43

## 2024-09-13 RX ADMIN — CEFTRIAXONE 100 MILLIGRAM(S): 500 INJECTION, POWDER, FOR SOLUTION INTRAMUSCULAR; INTRAVENOUS at 20:48

## 2024-09-13 RX ADMIN — IPRATROPIUM BROMIDE AND ALBUTEROL SULFATE 3 MILLILITER(S): .5; 2.5 SOLUTION RESPIRATORY (INHALATION) at 12:43

## 2024-09-14 ENCOUNTER — TRANSCRIPTION ENCOUNTER (OUTPATIENT)
Age: 89
End: 2024-09-14

## 2024-09-14 VITALS
TEMPERATURE: 97 F | DIASTOLIC BLOOD PRESSURE: 84 MMHG | HEART RATE: 73 BPM | RESPIRATION RATE: 18 BRPM | OXYGEN SATURATION: 93 % | SYSTOLIC BLOOD PRESSURE: 136 MMHG

## 2024-09-14 LAB
ALBUMIN SERPL ELPH-MCNC: 3.3 G/DL — SIGNIFICANT CHANGE UP (ref 3.3–5)
ALP SERPL-CCNC: 55 U/L — SIGNIFICANT CHANGE UP (ref 40–120)
ALT FLD-CCNC: 11 U/L — SIGNIFICANT CHANGE UP (ref 10–45)
ANION GAP SERPL CALC-SCNC: 14 MMOL/L — SIGNIFICANT CHANGE UP (ref 5–17)
AST SERPL-CCNC: 14 U/L — SIGNIFICANT CHANGE UP (ref 10–40)
BILIRUB SERPL-MCNC: 0.5 MG/DL — SIGNIFICANT CHANGE UP (ref 0.2–1.2)
BUN SERPL-MCNC: 32 MG/DL — HIGH (ref 7–23)
CALCIUM SERPL-MCNC: 8.7 MG/DL — SIGNIFICANT CHANGE UP (ref 8.4–10.5)
CHLORIDE SERPL-SCNC: 105 MMOL/L — SIGNIFICANT CHANGE UP (ref 96–108)
CO2 SERPL-SCNC: 23 MMOL/L — SIGNIFICANT CHANGE UP (ref 22–31)
CREAT SERPL-MCNC: 1.31 MG/DL — HIGH (ref 0.5–1.3)
EGFR: 52 ML/MIN/1.73M2 — LOW
EGFR: 52 ML/MIN/1.73M2 — LOW
GLUCOSE SERPL-MCNC: 138 MG/DL — HIGH (ref 70–99)
HCT VFR BLD CALC: 41.7 % — SIGNIFICANT CHANGE UP (ref 39–50)
HGB BLD-MCNC: 13.5 G/DL — SIGNIFICANT CHANGE UP (ref 13–17)
MAGNESIUM SERPL-MCNC: 2.1 MG/DL — SIGNIFICANT CHANGE UP (ref 1.6–2.6)
MCHC RBC-ENTMCNC: 32.4 GM/DL — SIGNIFICANT CHANGE UP (ref 32–36)
MCHC RBC-ENTMCNC: 33.7 PG — SIGNIFICANT CHANGE UP (ref 27–34)
MCV RBC AUTO: 104 FL — HIGH (ref 80–100)
NRBC # BLD: 0 /100 WBCS — SIGNIFICANT CHANGE UP (ref 0–0)
NRBC BLD-RTO: 0 /100 WBCS — SIGNIFICANT CHANGE UP (ref 0–0)
PHOSPHATE SERPL-MCNC: 3.7 MG/DL — SIGNIFICANT CHANGE UP (ref 2.5–4.5)
PLATELET # BLD AUTO: 132 K/UL — LOW (ref 150–400)
POTASSIUM SERPL-MCNC: 4.2 MMOL/L — SIGNIFICANT CHANGE UP (ref 3.5–5.3)
POTASSIUM SERPL-SCNC: 4.2 MMOL/L — SIGNIFICANT CHANGE UP (ref 3.5–5.3)
PROT SERPL-MCNC: 6.7 G/DL — SIGNIFICANT CHANGE UP (ref 6–8.3)
RBC # BLD: 4.01 M/UL — LOW (ref 4.2–5.8)
RBC # FLD: 14.6 % — HIGH (ref 10.3–14.5)
SODIUM SERPL-SCNC: 142 MMOL/L — SIGNIFICANT CHANGE UP (ref 135–145)
WBC # BLD: 5.68 K/UL — SIGNIFICANT CHANGE UP (ref 3.8–10.5)
WBC # FLD AUTO: 5.68 K/UL — SIGNIFICANT CHANGE UP (ref 3.8–10.5)

## 2024-09-14 PROCEDURE — 99239 HOSP IP/OBS DSCHRG MGMT >30: CPT

## 2024-09-14 RX ORDER — CEFPODOXIME PROXETIL 200 MG/1
1 TABLET, FILM COATED ORAL
Qty: 4 | Refills: 0
Start: 2024-09-14 | End: 2024-09-15

## 2024-09-14 RX ADMIN — DEXTROMETHORPHAN HBR, GUAIFENESIN 1200 MILLIGRAM(S): 200 LIQUID ORAL at 05:03

## 2024-09-14 RX ADMIN — AMLODIPINE BESYLATE 5 MILLIGRAM(S): 10 TABLET ORAL at 05:03

## 2024-09-14 RX ADMIN — IPRATROPIUM BROMIDE AND ALBUTEROL SULFATE 3 MILLILITER(S): .5; 2.5 SOLUTION RESPIRATORY (INHALATION) at 12:51

## 2024-09-14 RX ADMIN — Medication 1 APPLICATION(S): at 12:18

## 2024-09-14 RX ADMIN — APIXABAN 2.5 MILLIGRAM(S): 2.5 TABLET, FILM COATED ORAL at 05:03

## 2024-09-14 RX ADMIN — LISINOPRIL 40 MILLIGRAM(S): 5 TABLET ORAL at 05:03

## 2024-09-29 PROCEDURE — 87637 SARSCOV2&INF A&B&RSV AMP PRB: CPT

## 2024-09-29 PROCEDURE — 85027 COMPLETE CBC AUTOMATED: CPT

## 2024-09-29 PROCEDURE — 85014 HEMATOCRIT: CPT

## 2024-09-29 PROCEDURE — 99285 EMERGENCY DEPT VISIT HI MDM: CPT | Mod: 25

## 2024-09-29 PROCEDURE — 87899 AGENT NOS ASSAY W/OPTIC: CPT

## 2024-09-29 PROCEDURE — 84132 ASSAY OF SERUM POTASSIUM: CPT

## 2024-09-29 PROCEDURE — 82330 ASSAY OF CALCIUM: CPT

## 2024-09-29 PROCEDURE — 85610 PROTHROMBIN TIME: CPT

## 2024-09-29 PROCEDURE — 96375 TX/PRO/DX INJ NEW DRUG ADDON: CPT

## 2024-09-29 PROCEDURE — 83605 ASSAY OF LACTIC ACID: CPT

## 2024-09-29 PROCEDURE — 82947 ASSAY GLUCOSE BLOOD QUANT: CPT

## 2024-09-29 PROCEDURE — 87040 BLOOD CULTURE FOR BACTERIA: CPT

## 2024-09-29 PROCEDURE — 87449 NOS EACH ORGANISM AG IA: CPT

## 2024-09-29 PROCEDURE — 84295 ASSAY OF SERUM SODIUM: CPT

## 2024-09-29 PROCEDURE — 36415 COLL VENOUS BLD VENIPUNCTURE: CPT

## 2024-09-29 PROCEDURE — 82435 ASSAY OF BLOOD CHLORIDE: CPT

## 2024-09-29 PROCEDURE — 83735 ASSAY OF MAGNESIUM: CPT

## 2024-09-29 PROCEDURE — 85730 THROMBOPLASTIN TIME PARTIAL: CPT

## 2024-09-29 PROCEDURE — 87640 STAPH A DNA AMP PROBE: CPT

## 2024-09-29 PROCEDURE — 87641 MR-STAPH DNA AMP PROBE: CPT

## 2024-09-29 PROCEDURE — 80048 BASIC METABOLIC PNL TOTAL CA: CPT

## 2024-09-29 PROCEDURE — 82803 BLOOD GASES ANY COMBINATION: CPT

## 2024-09-29 PROCEDURE — 94640 AIRWAY INHALATION TREATMENT: CPT

## 2024-09-29 PROCEDURE — 80053 COMPREHEN METABOLIC PANEL: CPT

## 2024-09-29 PROCEDURE — 71250 CT THORAX DX C-: CPT | Mod: MC

## 2024-09-29 PROCEDURE — 84484 ASSAY OF TROPONIN QUANT: CPT

## 2024-09-29 PROCEDURE — 85025 COMPLETE CBC W/AUTO DIFF WBC: CPT

## 2024-09-29 PROCEDURE — 71045 X-RAY EXAM CHEST 1 VIEW: CPT

## 2024-09-29 PROCEDURE — 96374 THER/PROPH/DIAG INJ IV PUSH: CPT

## 2024-09-29 PROCEDURE — 85018 HEMOGLOBIN: CPT

## 2024-09-29 PROCEDURE — 83880 ASSAY OF NATRIURETIC PEPTIDE: CPT

## 2024-09-29 PROCEDURE — 84100 ASSAY OF PHOSPHORUS: CPT

## 2024-09-29 PROCEDURE — 97161 PT EVAL LOW COMPLEX 20 MIN: CPT

## 2024-09-29 PROCEDURE — 94664 DEMO&/EVAL PT USE INHALER: CPT

## 2024-11-21 ENCOUNTER — OUTPATIENT (OUTPATIENT)
Dept: OUTPATIENT SERVICES | Facility: HOSPITAL | Age: 88
LOS: 1 days | End: 2024-11-21
Payer: MEDICARE

## 2024-11-21 ENCOUNTER — APPOINTMENT (OUTPATIENT)
Dept: CT IMAGING | Facility: IMAGING CENTER | Age: 89
End: 2024-11-21

## 2024-11-21 DIAGNOSIS — Z00.8 ENCOUNTER FOR OTHER GENERAL EXAMINATION: ICD-10-CM

## 2024-11-21 DIAGNOSIS — Z90.49 ACQUIRED ABSENCE OF OTHER SPECIFIED PARTS OF DIGESTIVE TRACT: Chronic | ICD-10-CM

## 2024-11-21 PROCEDURE — 71250 CT THORAX DX C-: CPT

## 2024-11-21 PROCEDURE — 71250 CT THORAX DX C-: CPT | Mod: 26,MH

## 2024-12-12 ENCOUNTER — APPOINTMENT (OUTPATIENT)
Dept: GERIATRICS | Facility: CLINIC | Age: 88
End: 2024-12-12
Payer: MEDICARE

## 2024-12-12 VITALS
RESPIRATION RATE: 16 BRPM | HEART RATE: 88 BPM | DIASTOLIC BLOOD PRESSURE: 78 MMHG | SYSTOLIC BLOOD PRESSURE: 137 MMHG | BODY MASS INDEX: 28.8 KG/M2 | OXYGEN SATURATION: 94 % | WEIGHT: 195 LBS | TEMPERATURE: 98.3 F
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
## 2024-12-12 DIAGNOSIS — Z71.89 OTHER SPECIFIED COUNSELING: ICD-10-CM

## 2024-12-12 DIAGNOSIS — I10 ESSENTIAL (PRIMARY) HYPERTENSION: ICD-10-CM

## 2024-12-12 DIAGNOSIS — I48.19 OTHER PERSISTENT ATRIAL FIBRILLATION: ICD-10-CM

## 2024-12-12 DIAGNOSIS — K92.2 GASTROINTESTINAL HEMORRHAGE, UNSPECIFIED: ICD-10-CM

## 2024-12-12 DIAGNOSIS — E11.9 TYPE 2 DIABETES MELLITUS W/OUT COMPLICATIONS: ICD-10-CM

## 2024-12-12 DIAGNOSIS — I35.0 NONRHEUMATIC AORTIC (VALVE) STENOSIS: ICD-10-CM

## 2024-12-12 PROCEDURE — 99204 OFFICE O/P NEW MOD 45 MIN: CPT

## 2024-12-12 PROCEDURE — G2211 COMPLEX E/M VISIT ADD ON: CPT

## 2024-12-27 DIAGNOSIS — R05.9 COUGH, UNSPECIFIED: ICD-10-CM

## 2024-12-27 RX ORDER — AZITHROMYCIN 250 MG/1
250 TABLET, FILM COATED ORAL
Qty: 1 | Refills: 0 | Status: ACTIVE | COMMUNITY
Start: 2024-12-27 | End: 1900-01-01

## 2025-01-21 NOTE — ED PROVIDER NOTE - CROS ED GU ALL NEG
PSR checked the insurance card and its the same one as 2024, and [facility]   negative...

## 2025-04-17 NOTE — ED ADULT TRIAGE NOTE - WEIGHT IN KG
Ebglyss Monitoring Guidelines: There is no laboratory monitoring requirement with Ebglyss. Detail Level: Zone Is Cyclosporine Contraindicated?: No 90.7 Pregnancy And Lactation Warning Text: There is limited data about fetal risk in women taking Ebglyss while pregnant. It is unknown if this medication is excreted in breast milk. Diagnosis (Required): Atopic Dermatitis/Eczematous Dermatitis

## 2025-05-12 NOTE — PHYSICAL THERAPY INITIAL EVALUATION ADULT - IMPAIRMENTS FOUND, PT EVAL
Alert-The patient is alert, awake and responds to voice. The patient is oriented to time, place, and person. The triage nurse is able to obtain subjective information. aerobic capacity/endurance/ergonomics and body mechanics/fine motor/gait, locomotion, and balance/gross motor/integumentary integrity/joint integrity and mobility/muscle strength/posture/ROM

## 2025-08-05 ENCOUNTER — APPOINTMENT (OUTPATIENT)
Dept: CT IMAGING | Facility: IMAGING CENTER | Age: 89
End: 2025-08-05
Payer: MEDICARE

## 2025-08-05 ENCOUNTER — OUTPATIENT (OUTPATIENT)
Dept: OUTPATIENT SERVICES | Facility: HOSPITAL | Age: 89
LOS: 1 days | End: 2025-08-05
Payer: MEDICARE

## 2025-08-05 DIAGNOSIS — Z00.8 ENCOUNTER FOR OTHER GENERAL EXAMINATION: ICD-10-CM

## 2025-08-05 DIAGNOSIS — Z90.49 ACQUIRED ABSENCE OF OTHER SPECIFIED PARTS OF DIGESTIVE TRACT: Chronic | ICD-10-CM

## 2025-08-05 PROCEDURE — 71250 CT THORAX DX C-: CPT | Mod: 26

## 2025-08-05 PROCEDURE — 71250 CT THORAX DX C-: CPT | Mod: MH

## 2025-09-17 DIAGNOSIS — R17 UNSPECIFIED JAUNDICE: ICD-10-CM

## 2025-09-20 LAB
ALBUMIN SERPL ELPH-MCNC: 4.3 G/DL
ALP BLD-CCNC: 81 U/L
ALT SERPL-CCNC: 16 U/L
AST SERPL-CCNC: 16 U/L
BILIRUB DIRECT SERPL-MCNC: 0.29 MG/DL
BILIRUB INDIRECT SERPL-MCNC: 0.5 MG/DL
BILIRUB SERPL-MCNC: 0.8 MG/DL
PROT SERPL-MCNC: 7.1 G/DL